# Patient Record
Sex: MALE | Race: WHITE | NOT HISPANIC OR LATINO | Employment: OTHER | URBAN - METROPOLITAN AREA
[De-identification: names, ages, dates, MRNs, and addresses within clinical notes are randomized per-mention and may not be internally consistent; named-entity substitution may affect disease eponyms.]

---

## 2017-07-06 ENCOUNTER — APPOINTMENT (OUTPATIENT)
Dept: RADIOLOGY | Facility: MEDICAL CENTER | Age: 67
DRG: 871 | End: 2017-07-06
Attending: EMERGENCY MEDICINE
Payer: MEDICARE

## 2017-07-06 ENCOUNTER — RESOLUTE PROFESSIONAL BILLING HOSPITAL PROF FEE (OUTPATIENT)
Dept: HOSPITALIST | Facility: MEDICAL CENTER | Age: 67
End: 2017-07-06
Payer: MEDICARE

## 2017-07-06 ENCOUNTER — HOSPITAL ENCOUNTER (INPATIENT)
Facility: MEDICAL CENTER | Age: 67
LOS: 3 days | DRG: 871 | End: 2017-07-09
Attending: EMERGENCY MEDICINE | Admitting: INTERNAL MEDICINE
Payer: MEDICARE

## 2017-07-06 DIAGNOSIS — N39.0 ACUTE UTI: ICD-10-CM

## 2017-07-06 DIAGNOSIS — R47.1 DYSARTHRIA: ICD-10-CM

## 2017-07-06 PROBLEM — A41.9 SEPSIS, UNSPECIFIED: Status: ACTIVE | Noted: 2017-07-06

## 2017-07-06 LAB
ALBUMIN SERPL BCP-MCNC: 3.5 G/DL (ref 3.2–4.9)
ALBUMIN/GLOB SERPL: 1.2 G/DL
ALP SERPL-CCNC: 45 U/L (ref 30–99)
ALT SERPL-CCNC: 19 U/L (ref 2–50)
ANION GAP SERPL CALC-SCNC: 9 MMOL/L (ref 0–11.9)
APPEARANCE UR: CLEAR
APTT PPP: 28.6 SEC (ref 24.7–36)
AST SERPL-CCNC: 18 U/L (ref 12–45)
BACTERIA #/AREA URNS HPF: NEGATIVE /HPF
BASOPHILS # BLD AUTO: 0.2 % (ref 0–1.8)
BASOPHILS # BLD: 0.03 K/UL (ref 0–0.12)
BILIRUB SERPL-MCNC: 0.9 MG/DL (ref 0.1–1.5)
BILIRUB UR QL STRIP.AUTO: NEGATIVE
BUN SERPL-MCNC: 14 MG/DL (ref 8–22)
CALCIUM SERPL-MCNC: 8.8 MG/DL (ref 8.5–10.5)
CHLORIDE SERPL-SCNC: 107 MMOL/L (ref 96–112)
CO2 SERPL-SCNC: 19 MMOL/L (ref 20–33)
COLOR UR: YELLOW
CREAT SERPL-MCNC: 0.86 MG/DL (ref 0.5–1.4)
EOSINOPHIL # BLD AUTO: 0 K/UL (ref 0–0.51)
EOSINOPHIL NFR BLD: 0 % (ref 0–6.9)
EPI CELLS #/AREA URNS HPF: NEGATIVE /HPF
ERYTHROCYTE [DISTWIDTH] IN BLOOD BY AUTOMATED COUNT: 43.1 FL (ref 35.9–50)
GFR SERPL CREATININE-BSD FRML MDRD: >60 ML/MIN/1.73 M 2
GLOBULIN SER CALC-MCNC: 2.9 G/DL (ref 1.9–3.5)
GLUCOSE SERPL-MCNC: 132 MG/DL (ref 65–99)
GLUCOSE UR STRIP.AUTO-MCNC: NEGATIVE MG/DL
HCT VFR BLD AUTO: 40.8 % (ref 42–52)
HGB BLD-MCNC: 13.8 G/DL (ref 14–18)
HYALINE CASTS #/AREA URNS LPF: ABNORMAL /LPF
IMM GRANULOCYTES # BLD AUTO: 0.05 K/UL (ref 0–0.11)
IMM GRANULOCYTES NFR BLD AUTO: 0.4 % (ref 0–0.9)
INR PPP: 1.12 (ref 0.87–1.13)
KETONES UR STRIP.AUTO-MCNC: NEGATIVE MG/DL
LACTATE BLD-SCNC: 1.1 MMOL/L (ref 0.5–2)
LACTATE BLD-SCNC: 1.5 MMOL/L (ref 0.5–2)
LEUKOCYTE ESTERASE UR QL STRIP.AUTO: ABNORMAL
LYMPHOCYTES # BLD AUTO: 0.51 K/UL (ref 1–4.8)
LYMPHOCYTES NFR BLD: 4.1 % (ref 22–41)
MCH RBC QN AUTO: 28.9 PG (ref 27–33)
MCHC RBC AUTO-ENTMCNC: 33.8 G/DL (ref 33.7–35.3)
MCV RBC AUTO: 85.5 FL (ref 81.4–97.8)
MICRO URNS: ABNORMAL
MONOCYTES # BLD AUTO: 0.86 K/UL (ref 0–0.85)
MONOCYTES NFR BLD AUTO: 6.9 % (ref 0–13.4)
NEUTROPHILS # BLD AUTO: 11.07 K/UL (ref 1.82–7.42)
NEUTROPHILS NFR BLD: 88.4 % (ref 44–72)
NITRITE UR QL STRIP.AUTO: POSITIVE
NRBC # BLD AUTO: 0 K/UL
NRBC BLD AUTO-RTO: 0 /100 WBC
PH UR STRIP.AUTO: 6.5 [PH]
PLATELET # BLD AUTO: 152 K/UL (ref 164–446)
PMV BLD AUTO: 9.7 FL (ref 9–12.9)
POTASSIUM SERPL-SCNC: 3.6 MMOL/L (ref 3.6–5.5)
PROT SERPL-MCNC: 6.4 G/DL (ref 6–8.2)
PROT UR QL STRIP: NEGATIVE MG/DL
PROTHROMBIN TIME: 14.8 SEC (ref 12–14.6)
RBC # BLD AUTO: 4.77 M/UL (ref 4.7–6.1)
RBC # URNS HPF: ABNORMAL /HPF
RBC UR QL AUTO: ABNORMAL
SODIUM SERPL-SCNC: 135 MMOL/L (ref 135–145)
SP GR UR STRIP.AUTO: 1.04
TROPONIN I SERPL-MCNC: <0.01 NG/ML (ref 0–0.04)
WBC # BLD AUTO: 12.5 K/UL (ref 4.8–10.8)
WBC #/AREA URNS HPF: ABNORMAL /HPF

## 2017-07-06 PROCEDURE — 85610 PROTHROMBIN TIME: CPT

## 2017-07-06 PROCEDURE — 700105 HCHG RX REV CODE 258: Performed by: INTERNAL MEDICINE

## 2017-07-06 PROCEDURE — 87040 BLOOD CULTURE FOR BACTERIA: CPT

## 2017-07-06 PROCEDURE — 700102 HCHG RX REV CODE 250 W/ 637 OVERRIDE(OP): Performed by: EMERGENCY MEDICINE

## 2017-07-06 PROCEDURE — 36415 COLL VENOUS BLD VENIPUNCTURE: CPT

## 2017-07-06 PROCEDURE — 84484 ASSAY OF TROPONIN QUANT: CPT

## 2017-07-06 PROCEDURE — 80053 COMPREHEN METABOLIC PANEL: CPT

## 2017-07-06 PROCEDURE — 83036 HEMOGLOBIN GLYCOSYLATED A1C: CPT

## 2017-07-06 PROCEDURE — 99223 1ST HOSP IP/OBS HIGH 75: CPT | Mod: AI | Performed by: INTERNAL MEDICINE

## 2017-07-06 PROCEDURE — 83605 ASSAY OF LACTIC ACID: CPT

## 2017-07-06 PROCEDURE — 770020 HCHG ROOM/CARE - TELE (206)

## 2017-07-06 PROCEDURE — 81001 URINALYSIS AUTO W/SCOPE: CPT

## 2017-07-06 PROCEDURE — 70498 CT ANGIOGRAPHY NECK: CPT

## 2017-07-06 PROCEDURE — 94760 N-INVAS EAR/PLS OXIMETRY 1: CPT

## 2017-07-06 PROCEDURE — 85730 THROMBOPLASTIN TIME PARTIAL: CPT

## 2017-07-06 PROCEDURE — 87086 URINE CULTURE/COLONY COUNT: CPT

## 2017-07-06 PROCEDURE — 71010 DX-CHEST-LIMITED (1 VIEW): CPT

## 2017-07-06 PROCEDURE — A9270 NON-COVERED ITEM OR SERVICE: HCPCS | Performed by: INTERNAL MEDICINE

## 2017-07-06 PROCEDURE — 700117 HCHG RX CONTRAST REV CODE 255: Performed by: EMERGENCY MEDICINE

## 2017-07-06 PROCEDURE — 99285 EMERGENCY DEPT VISIT HI MDM: CPT

## 2017-07-06 PROCEDURE — 700102 HCHG RX REV CODE 250 W/ 637 OVERRIDE(OP): Performed by: INTERNAL MEDICINE

## 2017-07-06 PROCEDURE — 85025 COMPLETE CBC W/AUTO DIFF WBC: CPT

## 2017-07-06 PROCEDURE — 70450 CT HEAD/BRAIN W/O DYE: CPT

## 2017-07-06 PROCEDURE — 70496 CT ANGIOGRAPHY HEAD: CPT

## 2017-07-06 PROCEDURE — A9270 NON-COVERED ITEM OR SERVICE: HCPCS | Performed by: EMERGENCY MEDICINE

## 2017-07-06 RX ORDER — ASPIRIN 81 MG/1
81 TABLET, CHEWABLE ORAL DAILY
COMMUNITY
End: 2017-08-23

## 2017-07-06 RX ORDER — ONDANSETRON 2 MG/ML
4 INJECTION INTRAMUSCULAR; INTRAVENOUS EVERY 4 HOURS PRN
Status: DISCONTINUED | OUTPATIENT
Start: 2017-07-06 | End: 2017-07-09 | Stop reason: HOSPADM

## 2017-07-06 RX ORDER — SODIUM CHLORIDE 9 MG/ML
30 INJECTION, SOLUTION INTRAVENOUS
Status: COMPLETED | OUTPATIENT
Start: 2017-07-06 | End: 2017-07-06

## 2017-07-06 RX ORDER — BISACODYL 10 MG
10 SUPPOSITORY, RECTAL RECTAL
Status: DISCONTINUED | OUTPATIENT
Start: 2017-07-06 | End: 2017-07-09 | Stop reason: HOSPADM

## 2017-07-06 RX ORDER — ASPIRIN 300 MG/1
300 SUPPOSITORY RECTAL ONCE
Status: COMPLETED | OUTPATIENT
Start: 2017-07-06 | End: 2017-07-06

## 2017-07-06 RX ORDER — ACETAMINOPHEN 325 MG/1
650 TABLET ORAL EVERY 6 HOURS PRN
Status: DISCONTINUED | OUTPATIENT
Start: 2017-07-06 | End: 2017-07-09 | Stop reason: HOSPADM

## 2017-07-06 RX ORDER — ASPIRIN 325 MG
325 TABLET ORAL DAILY
Status: DISCONTINUED | OUTPATIENT
Start: 2017-07-06 | End: 2017-07-06 | Stop reason: ALTCHOICE

## 2017-07-06 RX ORDER — CIPROFLOXACIN 500 MG/1
500 TABLET, FILM COATED ORAL 2 TIMES DAILY
Status: ON HOLD | COMMUNITY
End: 2017-07-09

## 2017-07-06 RX ORDER — KETOROLAC TROMETHAMINE 30 MG/ML
30 INJECTION, SOLUTION INTRAMUSCULAR; INTRAVENOUS EVERY 6 HOURS PRN
Status: DISCONTINUED | OUTPATIENT
Start: 2017-07-06 | End: 2017-07-09 | Stop reason: HOSPADM

## 2017-07-06 RX ORDER — SODIUM CHLORIDE 9 MG/ML
INJECTION, SOLUTION INTRAVENOUS CONTINUOUS
Status: DISCONTINUED | OUTPATIENT
Start: 2017-07-06 | End: 2017-07-09

## 2017-07-06 RX ORDER — SODIUM CHLORIDE 9 MG/ML
1000 INJECTION, SOLUTION INTRAVENOUS ONCE
Status: DISCONTINUED | OUTPATIENT
Start: 2017-07-06 | End: 2017-07-06

## 2017-07-06 RX ORDER — LABETALOL HYDROCHLORIDE 5 MG/ML
10 INJECTION, SOLUTION INTRAVENOUS EVERY 4 HOURS PRN
Status: DISCONTINUED | OUTPATIENT
Start: 2017-07-06 | End: 2017-07-09 | Stop reason: HOSPADM

## 2017-07-06 RX ORDER — ASPIRIN 81 MG/1
81 TABLET, CHEWABLE ORAL DAILY
Status: DISCONTINUED | OUTPATIENT
Start: 2017-07-07 | End: 2017-07-09 | Stop reason: HOSPADM

## 2017-07-06 RX ORDER — ASPIRIN 325 MG
650 TABLET ORAL EVERY 6 HOURS PRN
COMMUNITY
End: 2017-08-23

## 2017-07-06 RX ORDER — AMOXICILLIN 250 MG
2 CAPSULE ORAL 2 TIMES DAILY
Status: DISCONTINUED | OUTPATIENT
Start: 2017-07-07 | End: 2017-07-09 | Stop reason: HOSPADM

## 2017-07-06 RX ORDER — SODIUM CHLORIDE 9 MG/ML
500 INJECTION, SOLUTION INTRAVENOUS
Status: DISCONTINUED | OUTPATIENT
Start: 2017-07-06 | End: 2017-07-09 | Stop reason: HOSPADM

## 2017-07-06 RX ORDER — ONDANSETRON 4 MG/1
4 TABLET, ORALLY DISINTEGRATING ORAL EVERY 4 HOURS PRN
Status: DISCONTINUED | OUTPATIENT
Start: 2017-07-06 | End: 2017-07-09 | Stop reason: HOSPADM

## 2017-07-06 RX ORDER — MORPHINE SULFATE 4 MG/ML
4 INJECTION, SOLUTION INTRAMUSCULAR; INTRAVENOUS
Status: DISCONTINUED | OUTPATIENT
Start: 2017-07-06 | End: 2017-07-09 | Stop reason: HOSPADM

## 2017-07-06 RX ORDER — POLYETHYLENE GLYCOL 3350 17 G/17G
1 POWDER, FOR SOLUTION ORAL
Status: DISCONTINUED | OUTPATIENT
Start: 2017-07-06 | End: 2017-07-09 | Stop reason: HOSPADM

## 2017-07-06 RX ORDER — OXYCODONE HYDROCHLORIDE 10 MG/1
10 TABLET ORAL
Status: DISCONTINUED | OUTPATIENT
Start: 2017-07-06 | End: 2017-07-09 | Stop reason: HOSPADM

## 2017-07-06 RX ORDER — OXYCODONE HYDROCHLORIDE 5 MG/1
5 TABLET ORAL
Status: DISCONTINUED | OUTPATIENT
Start: 2017-07-06 | End: 2017-07-09 | Stop reason: HOSPADM

## 2017-07-06 RX ADMIN — SODIUM CHLORIDE: 9 INJECTION, SOLUTION INTRAVENOUS at 23:19

## 2017-07-06 RX ADMIN — ACETAMINOPHEN 650 MG: 325 TABLET, FILM COATED ORAL at 20:58

## 2017-07-06 RX ADMIN — SODIUM CHLORIDE 1000 ML: 9 INJECTION, SOLUTION INTRAVENOUS at 18:50

## 2017-07-06 RX ADMIN — IOHEXOL 100 ML: 350 INJECTION, SOLUTION INTRAVENOUS at 15:59

## 2017-07-06 RX ADMIN — SODIUM CHLORIDE 1000 ML: 9 INJECTION, SOLUTION INTRAVENOUS at 17:40

## 2017-07-06 RX ADMIN — ASPIRIN 300 MG: 300 SUPPOSITORY RECTAL at 17:34

## 2017-07-06 ASSESSMENT — LIFESTYLE VARIABLES
EVER_SMOKED: NEVER
ALCOHOL_USE: NO

## 2017-07-06 ASSESSMENT — PAIN SCALES - GENERAL
PAINLEVEL_OUTOF10: 4
PAINLEVEL_OUTOF10: 3

## 2017-07-06 NOTE — IP AVS SNAPSHOT
LPATH Access Code: [email or web address]  Expires: 8/8/2017 11:31 AM    LPATH  A secure, online tool to manage your health information     [employer]’s LPATH® is a secure, online tool that connects you to your personalized health information from the privacy of your home -- day or night - making it very easy for you to manage your healthcare. Once the activation process is completed, you can even access your medical information using the LPATH linda, which is available for free in the Apple Linda store or Google Play store.     LPATH provides the following levels of access (as shown below):   My Chart Features   [facility] Primary Care Doctor [facility]  Specialists [facility]  Urgent  Care Non-[facility]  Primary Care  Doctor   Email your healthcare team securely and privately 24/7 X X X X   Manage appointments: schedule your next appointment; view details of past/upcoming appointments X      Request prescription refills. X      View recent personal medical records, including lab and immunizations X X X X   View health record, including health history, allergies, medications X X X X   Read reports about your outpatient visits, procedures, consult and ER notes X X X X   See your discharge summary, which is a recap of your hospital and/or ER visit that includes your diagnosis, lab results, and care plan. X X       How to register for LPATH:  1. Go to  [email or web address].  2. Click on the Sign Up Now box, which takes you to the New Member Sign Up page. You will need to provide the following information:  a. Enter your LPATH Access Code exactly as it appears at the top of this page. (You will not need to use this code after you’ve completed the sign-up process. If you do not sign up before the expiration date, you must request a new code.)   b. Enter your date of birth.   c. Enter your home email address.   d. Click Submit, and follow the next screen’s instructions.  3. Create a LPATH ID. This will be your LPATH  login ID and cannot be changed, so think of one that is secure and easy to remember.  4. Create a [employer] password. You can change your password at any time.  5. Enter your Password Reset Question and Answer. This can be used at a later time if you forget your password.   6. Enter your e-mail address. This allows you to receive e-mail notifications when new information is available in [employer].  7. Click Sign Up. You can now view your health information.    For assistance activating your [employer] account, call [phone]

## 2017-07-06 NOTE — ED NOTES
"Med rec complete per pt at bedside  Allergies reviewed - NKDA  Pt finished a second 10 day course of Cipro >2 weeks ago,  pt states he was originally put on Cipro, then some \"stronger antibiotic\"  After for 7 days, then put on the second course of Cipro   Pt states that he also got IV ABX at the other hospital, but unsure of what it was  Pt usually takes a baby aspirin daily, but states he will not take it when he   Takes full strength aspirin, which he has been recently for his headaches   "

## 2017-07-06 NOTE — IP AVS SNAPSHOT
7/9/2017    Carl Finney  [address]  [facility] [address]    Dear Carl:    [facility] wants to ensure your discharge home is safe and you or your loved ones have had all of your questions answered regarding your care after you leave the hospital.    Below is a list of resources and contact information should you have any questions regarding your hospital stay, follow-up instructions, or active medical symptoms.    Questions or Concerns Regarding… Contact   Medical Questions Related to Your Discharge  (7 days a week, 8am-5pm) Contact a Nurse Care Coordinator   [phone]   Medical Questions Not Related to Your Discharge  (24 hours a day / 7 days a week)  Contact the Nurse Health Line   [phone]    Medications or Discharge Instructions Refer to your discharge packet   or contact your [facility] Primary Care Provider   [phone]   Follow-up Appointment(s) Schedule your appointment via [employer]   or contact Scheduling [phone]   Billing Review your statement via [employer]  or contact Billing [phone]   Medical Records Review your records via [employer]   or contact Medical Records [phone]     You may receive a telephone call within two days of discharge. This call is to make certain you understand your discharge instructions and have the opportunity to have any questions answered. You can also easily access your medical information, test results and upcoming appointments via the [employer] free online health management tool. You can learn more and sign up at [email or web address]. For assistance setting up your [employer] account, please call [phone].    Once again, we want to ensure your discharge home is safe and that you have a clear understanding of any next steps in your care. If you have any questions or concerns, please do not hesitate to contact us, we are here for you. Thank you for choosing [facility] for your healthcare needs.    Sincerely,    Your [facility] Healthcare Team

## 2017-07-06 NOTE — IP AVS SNAPSHOT
" <p align=\"LEFT\"><IMG SRC=\"//[email or web address]$/[email or web address]\" alt=\"Image\" WIDTH=\"50%\" HEIGHT=\"200\" BORDER=\"\"></p>                   Name:Carl Finney  Medical Record Number:[id number]  CSN: [id number]    YOB: 1950   Age: 67 y.o.  Sex: male  HT:1.88 m (6' 2.02\") WT: 80.3 kg (177 lb 0.5 oz)          Admit Date: 7/6/2017     Discharge Date:   Today's Date: 7/9/2017  Attending Doctor:  Sari Cardoza D.O.                  Allergies:  Ceftriaxone          Your appointments     Jul 11, 2017 10:00 AM   CARE MANAGER TELEPHONE VISIT with CARE MANAGER   [address] [place] [id number] [phone]           ***IMPORTANT**** This is a phone visit only. Do not come into the office. The Care Manager will call you at the scheduled time for Care Manager Telephone Visit.            Jul 12, 2017  1:00 PM   Established Patient with Lisette Govea M.D.   [address] [place] [id number] [phone]           You will be receiving a confirmation call a few days before your appointment from our automated call confirmation system.              Follow-up Information     1. Schedule an appointment as soon as possible for a visit with Hadley Garcia M.D..    Specialty:  Urology    Why:  Our office will contact to arrange follow up in about 2 weeks    Contact information    [address]  [place] [address]  [phone]           Medication List      Take these Medications        Instructions    amoxicillin-clavulanate 875-125 MG Tabs   Commonly known as:  AUGMENTIN    Take 1 Tab by mouth 2 times a day for 10 days.   Dose:  1 Tab       * aspirin 325 MG Tabs   Commonly known as:  ASA    Take 650 mg by mouth every 6 hours as needed (headache).   Dose:  650 mg       * aspirin 81 MG Chew chewable tablet   Commonly known as:  ASA    Take 81 mg by mouth every day.   Dose:  81 mg       phenazopyridine 200 MG Tabs   "   Commonly known as:  PYRIDIUM    Take 1 Tab by mouth 3 times a day as needed (dysuria).   Dose:  200 mg       phosphorus 155-852-130 MG tablet   Commonly known as:  K-PHOS-NEUTRAL, PHOSPHA 250 NEUTRAL    Take 2 Tabs by mouth 2 Times a Day for 4 days.   Dose:  2 Tab       * Notice:  This list has 2 medication(s) that are the same as other medications prescribed for you. Read the directions carefully, and ask your doctor or other care provider to review them with you.

## 2017-07-06 NOTE — ED PROVIDER NOTES
ED Provider Note    Scribed for Vasyl Stephens M.D. by Kranthi Burton. 7/6/2017, 3:46 PM.    Primary care provider: Brendan Hargrove M.D.  Means of arrival: ambulance  History obtained from: patient, EMS  History limited by: acuity     CHIEF COMPLAINT  Chief Complaint   Patient presents with   • Possible Stroke       HPI  Carl Finney is a 67 y.o. male who presents to the Emergency Department for evaluation of difficulty speaking onset 2:00PM today. Per EMS, the patient was seen this morning at [facility] for UTI. He was given IV Rocephin and prescribedKeflex and sent home. The patient took a nap when he got home. He woke up at 2:00PM and had difficulty speaking and was confused. EMS adds the patient's wife reported the patient had left sided facial droop, but EMS states they did not note any facial droop. On arrival, the patient is awake, alert, and able to answer questions. The patient states he feels improved upon arrival to the ED. EMS reports the patient's blood glucose was 126. EMS states the patient has been having a persistent UTI for the past few months, and it has not been resolved with various antibiotics.  The patient has a history of frequent UTIs.    The history is limited by the acuity of this case.     REVIEW OF SYSTEMS  Review of Systems   Neurological:        Positive difficulty speaking  Positive confusion      The ROS is limited by the acuity of this case.     C.      PAST MEDICAL HISTORY   has a past medical history of Urinary incontinence; Arthritis; Cancer (CMS-HCC) (2016); and Bowel habit changes.    SURGICAL HISTORY   has past surgical history that includes other abdominal surgery (3/9/2016); other orthopedic surgery (1993); and inguinal hernia repair (Right, 9/30/2016).    SOCIAL HISTORY  Social History   Substance Use Topics   • Smoking status: Never Smoker    • Smokeless tobacco: Never Used   • Alcohol Use: No      History   Drug Use No       FAMILY HISTORY  No family  "history on file.    CURRENT MEDICATIONS  No current facility-administered medications on file prior to encounter.     No current outpatient prescriptions on file prior to encounter.      ALLERGIES  No Known Allergies    PHYSICAL EXAM  VITAL SIGNS: Temp(Src) 37.6 °C (99.7 °F)  Ht 1.88 m (6' 2.02\")  Wt 79.379 kg (175 lb)  BMI 22.46 kg/m2  Vitals reviewed.  Constitutional: Awake, alert, somewhat slow to respond, but no acute distress  HENT: Normocephalic, Atraumatic, Bilateral external ears normal, Oropharynx moist, No oral exudates, Nose normal.   Eyes: PERRL, EOMI, Conjunctiva normal, No discharge.   Neck: Normal range of motion, No tenderness, Supple, No stridor.   Cardiovascular: Normal heart rate, Normal rhythm, No murmurs, No rubs, No gallops.   Thorax & Lungs: Normal breath sounds, No respiratory distress, No wheezing,   Abdomen: Bowel sounds normal, Soft, No tenderness  Skin: Warm, Dry, No erythema, No rash.   Back: No tenderness, No CVA tenderness.     Musculoskeletal: Good range of motion in all major joints.   Neurologic: Alert, No focal deficits noted.   Psychiatric: Affect normal    LABS  Results for orders placed or performed during the hospital encounter of 07/06/17   PROTHROMBIN TIME   Result Value Ref Range    PT 14.8 (H) 12.0 - 14.6 sec    INR 1.12 0.87 - 1.13   APTT   Result Value Ref Range    APTT 28.6 24.7 - 36.0 sec   CBC WITH DIFFERENTIAL   Result Value Ref Range    WBC 12.5 (H) 4.8 - 10.8 K/uL    RBC 4.77 4.70 - 6.10 M/uL    Hemoglobin 13.8 (L) 14.0 - 18.0 g/dL    Hematocrit 40.8 (L) 42.0 - 52.0 %    MCV 85.5 81.4 - 97.8 fL    MCH 28.9 27.0 - 33.0 pg    MCHC 33.8 33.7 - 35.3 g/dL    RDW 43.1 35.9 - 50.0 fL    Platelet Count 152 (L) 164 - 446 K/uL    MPV 9.7 9.0 - 12.9 fL    Neutrophils-Polys 88.40 (H) 44.00 - 72.00 %    Lymphocytes 4.10 (L) 22.00 - 41.00 %    Monocytes 6.90 0.00 - 13.40 %    Eosinophils 0.00 0.00 - 6.90 %    Basophils 0.20 0.00 - 1.80 %    Immature Granulocytes 0.40 0.00 - " 0.90 %    Nucleated RBC 0.00 /100 WBC    Neutrophils (Absolute) 11.07 (H) 1.82 - 7.42 K/uL    Lymphs (Absolute) 0.51 (L) 1.00 - 4.80 K/uL    Monos (Absolute) 0.86 (H) 0.00 - 0.85 K/uL    Eos (Absolute) 0.00 0.00 - 0.51 K/uL    Baso (Absolute) 0.03 0.00 - 0.12 K/uL    Immature Granulocytes (abs) 0.05 0.00 - 0.11 K/uL    NRBC (Absolute) 0.00 K/uL   COMP METABOLIC PANEL   Result Value Ref Range    Sodium 135 135 - 145 mmol/L    Potassium 3.6 3.6 - 5.5 mmol/L    Chloride 107 96 - 112 mmol/L    Co2 19 (L) 20 - 33 mmol/L    Anion Gap 9.0 0.0 - 11.9    Glucose 132 (H) 65 - 99 mg/dL    Bun 14 8 - 22 mg/dL    Creatinine 0.86 0.50 - 1.40 mg/dL    Calcium 8.8 8.5 - 10.5 mg/dL    AST(SGOT) 18 12 - 45 U/L    ALT(SGPT) 19 2 - 50 U/L    Alkaline Phosphatase 45 30 - 99 U/L    Total Bilirubin 0.9 0.1 - 1.5 mg/dL    Albumin 3.5 3.2 - 4.9 g/dL    Total Protein 6.4 6.0 - 8.2 g/dL    Globulin 2.9 1.9 - 3.5 g/dL    A-G Ratio 1.2 g/dL   TROPONIN   Result Value Ref Range    Troponin I <0.01 0.00 - 0.04 ng/mL   LACTIC ACID   Result Value Ref Range    Lactic Acid 1.5 0.5 - 2.0 mmol/L   ESTIMATED GFR   Result Value Ref Range    GFR If African American >60 >60 mL/min/1.73 m 2    GFR If Non African American >60 >60 mL/min/1.73 m 2      All labs reviewed by me.    RADIOLOGY  DX-CHEST-LIMITED (1 VIEW)   Final Result      Linear atelectasis within the left lung base.      CT-CTA HEAD WITH & W/O-POST PROCESS   Final Result      CT angiogram of the [place] within normal limits.      CT-CTA NECK WITH & W/O-POST PROCESSING   Final Result      No evidence of carotid or vertebral arterial occlusion or dissection.      CT-HEAD W/O   Final Result      No evidence of acute intracranial process.      MR-BRAIN-W/O    (Results Pending)   ECHOCARDIOGRAM COMP W/O CONT    (Results Pending)   US-RENAL    (Results Pending)     The radiologist's interpretation of all radiological studies have been reviewed by me.    COURSE & MEDICAL DECISION  MAKING  Pertinent Labs & Imaging studies reviewed. (See chart for details)    3:28 PM - Patient seen and examined. Dr. Sabillon (Neurology) is also with the patient. The patient presents with difficulty speaking, and the differential diagnosis includes but is not limited to UTI, sepsis, CVA, electrolyte abnormality. Ordered for chest x-ray, head CT, CTA head CT, CTA neck CT, blood culture x2, UA, PTT, APTT, CBC with differential, CMP, Troponin, Lactic Acid to evaluate. Patient will be treated with 325 mg Aspirin for his symptoms. I discussed the treatment plan as above with the patient. He understood and verbalized agreement.     3:50 PM - Dr. Sabillon updated me on the patient's case. After evaluation, states the patient is not a candidate for IV alteplase. He concurs the patient may be sepsis rather than stroke secondary to his long history of chronic UTIs. Dr. Sabillon will order an MRI, and recommends the patient is admitted for sepsis.     The patient did not receive alteplase.  There is not a candidate for alteplase because of very minimal symptoms that are likely secondary to UTI and an acute delirium and not secondary to a CVA.    4:39 PM - I discussed the patient's case and the above findings with Dr. Self (Hospitalist) who agrees to admit the patient.         The patient is a UTI.  He has received Rocephin IV earlier this morning at another hospital..  This was given an [facility] hospital prior to coming here.  She is not given additional IV antibiotic.  Since this time.  Did not require fluid bolus, lactate is normal.  His blood pressure is normal. He will be admitted to the hospitals in critical condition.    DISPOSITION:  Patient will be admitted to Dr. Self in guarded condition.     FINAL IMPRESSION  1. Acute UTI    2. Dysarthria    3.  Rule out CVA  4.  Possible sepsis   5.  Acute delirium     I, Kranthi Burton (Scribe), am scribing for, and in the presence of, Vasyl Stephens M.D..    Electronically signed  by: Kranthi Burton (Scribe), 7/6/2017    IVasyl M.D. personally performed the services described in this documentation, as scribed by Kranthi Burton in my presence, and it is both accurate and complete.    The note accurately reflects work and decisions made by me.  Vasyl Stephens  7/6/2017  5:46 PM

## 2017-07-06 NOTE — IP AVS SNAPSHOT
" Home Care Instructions                                                                                                                  Name:Carl Finney  Medical Record Number:[id number]  CSN: [id number]    YOB: 1950   Age: 67 y.o.  Sex: male  HT:1.88 m (6' 2.02\") WT: 80.3 kg (177 lb 0.5 oz)          Admit Date: 7/6/2017     Discharge Date:   Today's Date: 7/9/2017  Attending Doctor:  Sari Cardoza D.O.                  Allergies:  Ceftriaxone            Discharge Instructions       Discharge Instructions    Discharged to home by car with relative. Discharged via wheelchair, hospital escort: Yes.  Special equipment needed: Not Applicable    Be sure to schedule a follow-up appointment with your primary care doctor or any specialists as instructed.     Discharge Plan:   Diet Plan: Discussed  Activity Level: Discussed  Confirmed Follow up Appointment: Appointment Scheduled  Confirmed Symptoms Management: Discussed  Medication Reconciliation Updated: Yes  Influenza Vaccine Indication: Not indicated: Previously immunized this influenza season and > 8 years of age    I understand that a diet low in cholesterol, fat, and sodium is recommended for good health. Unless I have been given specific instructions below for another diet, I accept this instruction as my diet prescription.   Other diet:     Special Instructions: Sepsis, Adult  Sepsis is a serious infection of your blood or tissues that affects your whole body. The infection that causes sepsis may be bacterial, viral, fungal, or parasitic. Sepsis may be life threatening. Sepsis can cause your blood pressure to drop. This may result in shock. Shock causes your central nervous system and your organs to stop working correctly.   RISK FACTORS  Sepsis can happen in anyone, but it is more likely to happen in people who have weakened immune systems.  SIGNS AND SYMPTOMS   Symptoms of sepsis can include:  · Fever or low body temperature " (hypothermia).  · Rapid breathing (hyperventilation).  · Chills.  · Rapid heartbeat (tachycardia).  · Confusion or light-headedness.  · Trouble breathing.  · Urinating much less than usual.  · Cool, clammy skin or red, flushed skin.  · Other problems with the heart, kidneys, or brain.  DIAGNOSIS   Your health care provider will likely do tests to look for an infection, to see if the infection has spread to your blood, and to see how serious your condition is. Tests can include:  · Blood tests, including cultures of your blood.  · Cultures of other fluids from your body, such as:  ¨ Urine.  ¨ Pus from wounds.  ¨ Mucus coughed up from your lungs.  · Urine tests other than cultures.  · X-ray exams or other imaging tests.  TREATMENT   Treatment will begin with elimination of the source of infection. If your sepsis is likely caused by a bacterial or fungal infection, you will be given antibiotic or antifungal medicines.  You may also receive:  · Oxygen.  · Fluids through an IV tube.  · Medicines to increase your blood pressure.  · A machine to clean your blood (dialysis) if your kidneys fail.  · A machine to help you breathe if your lungs fail.  SEEK IMMEDIATE MEDICAL CARE IF:  You get an infection or develop any of the signs and symptoms of sepsis after surgery or a hospitalization.     This information is not intended to replace advice given to you by your health care provider. Make sure you discuss any questions you have with your health care provider.     Document Released: 09/15/2004 Document Revised: 05/03/2016 Document Reviewed: 08/25/2014  [employer] Interactive Patient Education ©2016 [employer]      · Is patient discharged on Warfarin / Coumadin?   No     · Is patient Post Blood Transfusion?  No    Depression / Suicide Risk    As you are discharged from this [facility] facility, it is important to learn how to keep safe from harming yourself.    Recognize the warning signs:  · Abrupt changes in personality,  positive or negative- including increase in energy   · Giving away possessions  · Change in eating patterns- significant weight changes-  positive or negative  · Change in sleeping patterns- unable to sleep or sleeping all the time   · Unwillingness or inability to communicate  · Depression  · Unusual sadness, discouragement and loneliness  · Talk of wanting to die  · Neglect of personal appearance   · Rebelliousness- reckless behavior  · Withdrawal from people/activities they love  · Confusion- inability to concentrate     If you or a loved one observes any of these behaviors or has concerns about self-harm, here's what you can do:  · Talk about it- your feelings and reasons for harming yourself  · Remove any means that you might use to hurt yourself (examples: pills, rope, extension cords, firearm)  · Get professional help from the community (Mental Health, Substance Abuse, psychological counseling)  · Do not be alone:Call your Safe Contact- someone whom you trust who will be there for you.  · Call your local CRISIS HOTLINE 155-4277 or 818-174-2540  · Call your local Children's Mobile Crisis Response Team [place] (441) 139-6786 or wwwTagito  · Call the toll free National Suicide Prevention Hotlines   · National Suicide Prevention Lifeline 492-982-YKOG (0528)  · National Hope Line Network 800-SUICIDE (739-7832)        Your appointments     Jul 11, 2017 10:00 AM   CARE MANAGER TELEPHONE VISIT with CARE MANAGER   [address]  [facility] [id number] [phone]           ***IMPORTANT**** This is a phone visit only. Do not come into the office. The Care Manager will call you at the scheduled time for Care Manager Telephone Visit.            Jul 12, 2017  1:00 PM   Established Patient with Lisette Govea M.D.   [address]  [facility] [id number] [phone]           You will be receiving a confirmation  call a few days before your appointment from our automated call confirmation system.              Follow-up Information     1. Schedule an appointment as soon as possible for a visit with Hadley Garcia M.D..    Specialty:  Urology    Why:  Our office will contact to arrange follow up in about 2 weeks    Contact information    [address] [place] [address]  [phone]           Discharge Medication Instructions:    Below are the medications your physician expects you to take upon discharge:    Review all your home medications and newly ordered medications with your doctor and/or pharmacist. Follow medication instructions as directed by your doctor and/or pharmacist.    Please keep your medication list with you and share with your physician.               Medication List      START taking these medications        Instructions    Morning Afternoon Evening Bedtime    amoxicillin-clavulanate 875-125 MG Tabs   Commonly known as:  AUGMENTIN   Next Dose Due:  07/09/2017 in the evening           Take 1 Tab by mouth 2 times a day for 10 days.   Dose:  1 Tab                        phenazopyridine 200 MG Tabs   Commonly known as:  PYRIDIUM        Take 1 Tab by mouth 3 times a day as needed (dysuria).   Dose:  200 mg                        phosphorus 155-852-130 MG tablet   Last time this was given:  2 Tabs on 7/9/2017  8:23 AM   Commonly known as:  K-PHOS-NEUTRAL, PHOSPHA 250 NEUTRAL   Next Dose Due:  07/09/2017 in the evening           Take 2 Tabs by mouth 2 Times a Day for 4 days.   Dose:  2 Tab                          CONTINUE taking these medications        Instructions    Morning Afternoon Evening Bedtime    * aspirin 325 MG Tabs   Commonly known as:  ASA        Take 650 mg by mouth every 6 hours as needed (headache).   Dose:  650 mg                        * aspirin 81 MG Chew chewable tablet   Last time this was given:  81 mg on 7/9/2017  8:23 AM   Commonly known as:  ASA   Next Dose Due:  07/10/2017 in the  morning         Take 81 mg by mouth every day.   Dose:  81 mg                        * Notice:  This list has 2 medication(s) that are the same as other medications prescribed for you. Read the directions carefully, and ask your doctor or other care provider to review them with you.      STOP taking these medications     ciprofloxacin 500 MG Tabs   Commonly known as:  CIPRO                    Where to Get Your Medications      Information about where to get these medications is not yet available     ! Ask your nurse or doctor about these medications    - amoxicillin-clavulanate 875-125 MG Tabs  - phenazopyridine 200 MG Tabs  - phosphorus 155-852-130 MG tablet            Instructions           Diet / Nutrition:    Follow any diet instructions given to you by your doctor or the dietician, including how much salt (sodium) you are allowed each day.    If you are overweight, talk to your doctor about a weight reduction plan.    Activity:    Remain physically active following your doctor's instructions about exercise and activity.    Rest often.     Any time you become even a little tired or short of breath, SIT DOWN and rest.    Worsening Symptoms:    Report any of the following signs and symptoms to the doctor's office immediately:    *Pain of jaw, arm, or neck  *Chest pain not relieved by medication                               *Dizziness or loss of consciousness  *Difficulty breathing even when at rest   *More tired than usual                                       *Bleeding drainage or swelling of surgical site  *Swelling of feet, ankles, legs or stomach                 *Fever (>100ºF)  *Pink or blood tinged sputum  *Weight gain (3lbs/day or 5lbs /week)           *Shock from internal defibrillator (if applicable)  *Palpitations or irregular heartbeats                *Cool and/or numb extremities    Stroke Awareness    Common Risk Factors for Stroke include:    Age  Atrial Fibrillation  Carotid Artery  Stenosis  Diabetes Mellitus  Excessive alcohol consumption  High blood pressure  Overweight   Physical inactivity  Smoking    Warning signs and symptoms of a stroke include:    *Sudden numbness or weakness of the face, arm or leg (especially on one side of the body).  *Sudden confusion, trouble speaking or understanding.  *Sudden trouble seeing in one or both eyes.  *Sudden trouble walking, dizziness, loss of balance or coordination.Sudden severe headache with no known cause.    It is very important to get treatment quickly when a stroke occurs. If you experience any of the above warning signs, call 911 immediately.                   Disclaimer         Quit Smoking / Tobacco Use:    I understand the use of any tobacco products increases my chance of suffering from future heart disease or stroke and could cause other illnesses which may shorten my life. Quitting the use of tobacco products is the single most important thing I can do to improve my health. For further information on smoking / tobacco cessation call a Toll Free Quit Line at 1-978.261.5662 (*National Cancer [place]) or 1-965.275.1269 (American Lung Association) or you can access the web based program at www.lungXenon Arc.org.    [place] Tobacco Users Help Line:  (656) 981-5593       Toll Free: 1-551.522.4799  Quit Tobacco Program [facility] (339)278-4262    Crisis Hotline:    [place] Crisis Hotline:  8-303-SGMWDRV or 1-985.141.6336    [place] Crisis Hotline:    1-153.918.6165 or 647-747-5364    Discharge Survey:   Thank you for choosing [facility]. We hope we did everything we could to make your hospital stay a pleasant one. You may be receiving a phone survey and we would appreciate your time and participation in answering the questions. Your input is very valuable to us in our efforts to improve our service to our patients and their families.        My signature on this form indicates that:    1. I have reviewed and understand the  above information.  2. My questions regarding this information have been answered to my satisfaction.  3. I have formulated a plan with my discharge nurse to obtain my prescribed medications for home.                  Disclaimer         __________________________________                     __________       ________                       Patient Signature                                                 Date                    Time

## 2017-07-06 NOTE — PROGRESS NOTES
NIHSS    1a. Level of Consciousness  0 Alert. Keenly Responsive  1 Not Alert but arousable by minor stimulation  2 Not Alert. Requires repeated stimulation  3 Responds only w reflex motor/autonomic effects or totally unresponsive  Score:0  1b. Level of Consciousness: ask month and age  0 Answers both questions correctly  1 Answers one question correctly  2 Answers neither question correctly  Score:0  1c. Level of Consciousness: ask to open and close eyes/make a fist with non-paretic hand  0 Performs both tasks correctly  1 Performs one task correctly  2 Performs neither task correctly  Score:0  2. Best gaze: Horizontal eye movement   0 Normal   1 Partial gaze palsy. Forced deviation or total palsy not present   2 Forced deviation. Not overcome by oculo-cephalic maneuver   Score:0  3. Visual Fields   0 No loss   1 Partial hemianopia   2 Complete hemianopia   3 Bilateral hemianopia. Blindness   Score:0  4. Facial Palsy   0 Normal symmetrical movements   1 Minor paralysis   2 Partial paralysis   3 Complete paralysis   Score:0  5. Motor Arm Right and Left   0 No drift   1 Drift but does not hit bed   2 Some effort against gravity. Hits bed   3 No effort against gravity   4 No movement   UN Amputation/joint fusion/explain   Score R:0 Score L:0  6. Motor Leg: Right and Left   0 No drift   1 Drift but does not hit bed   2 Some effort against gravity. Hits bed   3 No effort against gravity   4 No movement   UN Amputation/joint fusion/explain   Score R :0   Score L :0  7. Limb Ataxia. Finger to nose/ shin-heel   0 Absent   1  Present in one limb   2 Present in two limbs   UN Amputation/joint fusion explain   Score:0  8. Sensory. Pinprick   0 Normal. No sensory loss   1 Mild to moderate sensory loss   2 Severe or total sensory loss   Score:0  9. Best language. Show the kitchen/cookie picture   0 No aphasia   1 Mild to moderate aphasia   2 Severe aphasia   3 Mute. Global aphasia   Score:0  10. Dysarthria.  Show the reading  list   0 Normal   1 Mild to moderate dysarthria   2 Severe dysarthria   3 Intubated or other physical barrier   Score:0  11. Extinction and Inattention   0 No abnormality   1 Extinction to simultaneous stimulation   2 Profound Atul-inattention or extinction   Score:0    TOTAL: 0    Presumed Mechanism by TOAST:    ___Large Artery    ___Small Vessel (Lacunar)    ___Cardioembolic    ___ Other (Sickle Cell Disease, Vasculitis, Hypercoagulable State, etc)    ___Unknown

## 2017-07-06 NOTE — ED NOTES
Pt bib EMS, diverted by [id number]. Pt reports headache since 10 pm last night. Pt was seen at ER in [place] this am for abdominal pain and was dx with  UTI. Pt reports he went down for a nap at 1300 and woke up at 1400 with difficulty speaking. FSBS 126. Speech clear on arrival, LIZ, no neuro deficits noted.

## 2017-07-06 NOTE — CONSULTS
"Date of Service:  7/6/17    PCP: Brendan Hargrove M.D.    CC:  Code stroke    HPI: This is a 67 y.o. male with hx prostatectomy noted UTI sx this am.  Went to [facility], given antibiotics.  Went home, took a nap at 1pm.  Awakened at 2pm, felt confused, had difficulty communicating with his wife.  Now resolved.    ROS: As above. The remainder of a complete review of systems is negative in all systems except as noted.    PMHx:  Active Ambulatory Problems     Diagnosis Date Noted   • Right inguinal hernia 09/30/2016     Resolved Ambulatory Problems     Diagnosis Date Noted   • No Resolved Ambulatory Problems     Past Medical History   Diagnosis Date   • Urinary incontinence    • Arthritis    • Cancer ([facility]) 2016   • Bowel habit changes        SHx:  Social History     Social History   • Marital Status:      Spouse Name: N/A   • Number of Children: N/A   • Years of Education: N/A     Occupational History   • Not on file.     Social History Main Topics   • Smoking status: Never Smoker    • Smokeless tobacco: Never Used   • Alcohol Use: No   • Drug Use: No   • Sexual Activity: Not on file     Other Topics Concern   • Not on file     Social History Narrative       FHx:  family history is not on file.    Allergies:  No Known Allergies    Medications:  No current facility-administered medications on file prior to encounter.     Current Outpatient Prescriptions on File Prior to Encounter   Medication Sig Dispense Refill   • atorvastatin (LIPITOR) 40 MG Tab Take 40 mg by mouth every 48 hours.     • aspirin EC (ECOTRIN) 81 MG Tablet Delayed Response Take 81 mg by mouth every day.     • ibuprofen (MOTRIN) 200 MG Tab Take 400 mg by mouth 2 times a day as needed.         Objective Exam:  Filed Vitals:    07/06/17 1546 07/06/17 1550   BP:  125/79   Pulse:  95   Temp: 37.6 °C (99.7 °F)    Resp:  16   Height: 1.88 m (6' 2.02\")    Weight: 79.379 kg (175 lb)    SpO2:  95%       General: Alert, O x 3.  Conversant.  HEENT: PERRL, " EOM are full, VF full.  Neuro: Motor is =, sensation is =.  DTR =, toes downgoing.  CN intact.    Laboratory--reviewed personally and are as follows:  Lab Results   Component Value Date/Time    WBC 12.5* 07/06/2017 03:20 PM    RBC 4.77 07/06/2017 03:20 PM    HEMOGLOBIN 13.8* 07/06/2017 03:20 PM    HEMATOCRIT 40.8* 07/06/2017 03:20 PM    MCV 85.5 07/06/2017 03:20 PM    MCH 28.9 07/06/2017 03:20 PM    MCHC 33.8 07/06/2017 03:20 PM    MPV 9.7 07/06/2017 03:20 PM    NEUTROPHILS-POLYS 88.40* 07/06/2017 03:20 PM    LYMPHOCYTES 4.10* 07/06/2017 03:20 PM    MONOCYTES 6.90 07/06/2017 03:20 PM    EOSINOPHILS 0.00 07/06/2017 03:20 PM    BASOPHILS 0.20 07/06/2017 03:20 PM      Lab Results   Component Value Date/Time    SODIUM 140 09/29/2016 08:37 AM    POTASSIUM 4.0 09/29/2016 08:37 AM    CHLORIDE 107 09/29/2016 08:37 AM    CO2 27 09/29/2016 08:37 AM    GLUCOSE 75 09/29/2016 08:37 AM    BUN 19 09/29/2016 08:37 AM    CREATININE 0.84 09/29/2016 08:37 AM      No results found for: PROTHROMBTM, INR     Radiology  reviewed    MDM:  67 y.o. male here with episode of confusion, resolved.  Exam is nl, episode not consistent with a stroke.  Not a tpa candidate.  Will check MRI and ECHO.    Assessment/Plan:  Active Problems:    * No active hospital problems. *  Resolved Problems:    * No resolved hospital problems. *

## 2017-07-07 ENCOUNTER — APPOINTMENT (OUTPATIENT)
Dept: RADIOLOGY | Facility: MEDICAL CENTER | Age: 67
DRG: 871 | End: 2017-07-07
Attending: SPECIALIST
Payer: MEDICARE

## 2017-07-07 ENCOUNTER — APPOINTMENT (OUTPATIENT)
Dept: RADIOLOGY | Facility: MEDICAL CENTER | Age: 67
DRG: 871 | End: 2017-07-07
Attending: INTERNAL MEDICINE
Payer: MEDICARE

## 2017-07-07 ENCOUNTER — APPOINTMENT (OUTPATIENT)
Dept: RADIOLOGY | Facility: MEDICAL CENTER | Age: 67
DRG: 871 | End: 2017-07-07
Attending: UROLOGY
Payer: MEDICARE

## 2017-07-07 PROBLEM — Z90.79 HISTORY OF PROSTATECTOMY: Status: ACTIVE | Noted: 2017-07-07

## 2017-07-07 PROBLEM — N13.30 HYDRONEPHROSIS OF LEFT KIDNEY: Status: ACTIVE | Noted: 2017-07-07

## 2017-07-07 PROBLEM — G93.41 ACUTE METABOLIC ENCEPHALOPATHY: Status: ACTIVE | Noted: 2017-07-07

## 2017-07-07 PROBLEM — N39.0 UTI (URINARY TRACT INFECTION): Status: ACTIVE | Noted: 2017-07-07

## 2017-07-07 PROBLEM — N20.0 RENAL STONES: Status: ACTIVE | Noted: 2017-07-07

## 2017-07-07 LAB
ANION GAP SERPL CALC-SCNC: 6 MMOL/L (ref 0–11.9)
BASOPHILS # BLD AUTO: 0.1 % (ref 0–1.8)
BASOPHILS # BLD: 0.02 K/UL (ref 0–0.12)
BUN SERPL-MCNC: 14 MG/DL (ref 8–22)
CALCIUM SERPL-MCNC: 8.2 MG/DL (ref 8.5–10.5)
CHLORIDE SERPL-SCNC: 107 MMOL/L (ref 96–112)
CHOLEST SERPL-MCNC: 166 MG/DL (ref 100–199)
CO2 SERPL-SCNC: 23 MMOL/L (ref 20–33)
CREAT SERPL-MCNC: 0.98 MG/DL (ref 0.5–1.4)
EOSINOPHIL # BLD AUTO: 0 K/UL (ref 0–0.51)
EOSINOPHIL NFR BLD: 0 % (ref 0–6.9)
ERYTHROCYTE [DISTWIDTH] IN BLOOD BY AUTOMATED COUNT: 44.1 FL (ref 35.9–50)
EST. AVERAGE GLUCOSE BLD GHB EST-MCNC: 117 MG/DL
EST. AVERAGE GLUCOSE BLD GHB EST-MCNC: 117 MG/DL
GFR SERPL CREATININE-BSD FRML MDRD: >60 ML/MIN/1.73 M 2
GLUCOSE SERPL-MCNC: 113 MG/DL (ref 65–99)
HBA1C MFR BLD: 5.7 % (ref 0–5.6)
HBA1C MFR BLD: 5.7 % (ref 0–5.6)
HCT VFR BLD AUTO: 39.9 % (ref 42–52)
HDLC SERPL-MCNC: 41 MG/DL
HGB BLD-MCNC: 13.2 G/DL (ref 14–18)
IMM GRANULOCYTES # BLD AUTO: 0.05 K/UL (ref 0–0.11)
IMM GRANULOCYTES NFR BLD AUTO: 0.4 % (ref 0–0.9)
LACTATE BLD-SCNC: 1.2 MMOL/L (ref 0.5–2)
LDLC SERPL CALC-MCNC: 114 MG/DL
LV EJECT FRACT  99904: 75
LV EJECT FRACT MOD 2C 99903: 85.41
LV EJECT FRACT MOD 4C 99902: 65.67
LV EJECT FRACT MOD BP 99901: 76.74
LYMPHOCYTES # BLD AUTO: 0.94 K/UL (ref 1–4.8)
LYMPHOCYTES NFR BLD: 6.8 % (ref 22–41)
MCH RBC QN AUTO: 28.9 PG (ref 27–33)
MCHC RBC AUTO-ENTMCNC: 33.1 G/DL (ref 33.7–35.3)
MCV RBC AUTO: 87.3 FL (ref 81.4–97.8)
MONOCYTES # BLD AUTO: 1.17 K/UL (ref 0–0.85)
MONOCYTES NFR BLD AUTO: 8.5 % (ref 0–13.4)
NEUTROPHILS # BLD AUTO: 11.61 K/UL (ref 1.82–7.42)
NEUTROPHILS NFR BLD: 84.2 % (ref 44–72)
NRBC # BLD AUTO: 0 K/UL
NRBC BLD AUTO-RTO: 0 /100 WBC
PLATELET # BLD AUTO: 132 K/UL (ref 164–446)
PMV BLD AUTO: 9.9 FL (ref 9–12.9)
POTASSIUM SERPL-SCNC: 4 MMOL/L (ref 3.6–5.5)
RBC # BLD AUTO: 4.57 M/UL (ref 4.7–6.1)
SODIUM SERPL-SCNC: 136 MMOL/L (ref 135–145)
TRIGL SERPL-MCNC: 55 MG/DL (ref 0–149)
WBC # BLD AUTO: 13.8 K/UL (ref 4.8–10.8)

## 2017-07-07 PROCEDURE — C1769 GUIDE WIRE: HCPCS | Performed by: UROLOGY

## 2017-07-07 PROCEDURE — 700111 HCHG RX REV CODE 636 W/ 250 OVERRIDE (IP)

## 2017-07-07 PROCEDURE — 700102 HCHG RX REV CODE 250 W/ 637 OVERRIDE(OP): Performed by: INTERNAL MEDICINE

## 2017-07-07 PROCEDURE — 70551 MRI BRAIN STEM W/O DYE: CPT

## 2017-07-07 PROCEDURE — 83605 ASSAY OF LACTIC ACID: CPT

## 2017-07-07 PROCEDURE — 700105 HCHG RX REV CODE 258: Performed by: INTERNAL MEDICINE

## 2017-07-07 PROCEDURE — A4357 BEDSIDE DRAINAGE BAG: HCPCS | Performed by: UROLOGY

## 2017-07-07 PROCEDURE — 36415 COLL VENOUS BLD VENIPUNCTURE: CPT

## 2017-07-07 PROCEDURE — 160035 HCHG PACU - 1ST 60 MINS PHASE I: Performed by: UROLOGY

## 2017-07-07 PROCEDURE — 501329 HCHG SET, CYSTO IRRIG Y TUR: Performed by: UROLOGY

## 2017-07-07 PROCEDURE — 770006 HCHG ROOM/CARE - MED/SURG/GYN SEMI*

## 2017-07-07 PROCEDURE — 93306 TTE W/DOPPLER COMPLETE: CPT

## 2017-07-07 PROCEDURE — 99233 SBSQ HOSP IP/OBS HIGH 50: CPT | Performed by: INTERNAL MEDICINE

## 2017-07-07 PROCEDURE — 160009 HCHG ANES TIME/MIN: Performed by: UROLOGY

## 2017-07-07 PROCEDURE — 160028 HCHG SURGERY MINUTES - 1ST 30 MINS LEVEL 3: Performed by: UROLOGY

## 2017-07-07 PROCEDURE — 700101 HCHG RX REV CODE 250

## 2017-07-07 PROCEDURE — 80061 LIPID PANEL: CPT

## 2017-07-07 PROCEDURE — A9270 NON-COVERED ITEM OR SERVICE: HCPCS | Performed by: INTERNAL MEDICINE

## 2017-07-07 PROCEDURE — 700111 HCHG RX REV CODE 636 W/ 250 OVERRIDE (IP): Performed by: INTERNAL MEDICINE

## 2017-07-07 PROCEDURE — 93306 TTE W/DOPPLER COMPLETE: CPT | Mod: 26 | Performed by: INTERNAL MEDICINE

## 2017-07-07 PROCEDURE — 160002 HCHG RECOVERY MINUTES (STAT): Performed by: UROLOGY

## 2017-07-07 PROCEDURE — 76775 US EXAM ABDO BACK WALL LIM: CPT

## 2017-07-07 PROCEDURE — 160039 HCHG SURGERY MINUTES - EA ADDL 1 MIN LEVEL 3: Performed by: UROLOGY

## 2017-07-07 PROCEDURE — 502240 HCHG MISC OR SUPPLY RC 0272: Performed by: UROLOGY

## 2017-07-07 PROCEDURE — 160048 HCHG OR STATISTICAL LEVEL 1-5: Performed by: UROLOGY

## 2017-07-07 PROCEDURE — BT141ZZ FLUOROSCOPY OF KIDNEYS, URETERS AND BLADDER USING LOW OSMOLAR CONTRAST: ICD-10-PCS | Performed by: UROLOGY

## 2017-07-07 PROCEDURE — 85025 COMPLETE CBC W/AUTO DIFF WBC: CPT

## 2017-07-07 PROCEDURE — C2617 STENT, NON-COR, TEM W/O DEL: HCPCS | Performed by: UROLOGY

## 2017-07-07 PROCEDURE — 83036 HEMOGLOBIN GLYCOSYLATED A1C: CPT

## 2017-07-07 PROCEDURE — 80048 BASIC METABOLIC PNL TOTAL CA: CPT

## 2017-07-07 PROCEDURE — 500879 HCHG PACK, CYSTO: Performed by: UROLOGY

## 2017-07-07 PROCEDURE — 0T778DZ DILATION OF LEFT URETER WITH INTRALUMINAL DEVICE, VIA NATURAL OR ARTIFICIAL OPENING ENDOSCOPIC: ICD-10-PCS | Performed by: UROLOGY

## 2017-07-07 DEVICE — STENT UROLOGICAL POLARIS 6X28  ULTRA: Type: IMPLANTABLE DEVICE | Site: URETER | Status: FUNCTIONAL

## 2017-07-07 RX ORDER — DIPHENHYDRAMINE HCL 25 MG
25 TABLET ORAL EVERY 6 HOURS PRN
Status: DISCONTINUED | OUTPATIENT
Start: 2017-07-07 | End: 2017-07-09 | Stop reason: HOSPADM

## 2017-07-07 RX ORDER — PHENAZOPYRIDINE HYDROCHLORIDE 200 MG/1
200 TABLET, FILM COATED ORAL 3 TIMES DAILY PRN
Status: DISCONTINUED | OUTPATIENT
Start: 2017-07-07 | End: 2017-07-09 | Stop reason: HOSPADM

## 2017-07-07 RX ADMIN — SODIUM CHLORIDE: 9 INJECTION, SOLUTION INTRAVENOUS at 15:42

## 2017-07-07 RX ADMIN — PIPERACILLIN SODIUM AND TAZOBACTAM SODIUM 3.38 G: 3; .375 INJECTION, POWDER, FOR SOLUTION INTRAVENOUS at 17:40

## 2017-07-07 RX ADMIN — ACETAMINOPHEN 650 MG: 325 TABLET, FILM COATED ORAL at 14:03

## 2017-07-07 RX ADMIN — ENOXAPARIN SODIUM 40 MG: 100 INJECTION SUBCUTANEOUS at 08:28

## 2017-07-07 RX ADMIN — DIPHENHYDRAMINE HCL 25 MG: 25 TABLET ORAL at 14:21

## 2017-07-07 RX ADMIN — SODIUM CHLORIDE: 9 INJECTION, SOLUTION INTRAVENOUS at 06:43

## 2017-07-07 RX ADMIN — PIPERACILLIN SODIUM AND TAZOBACTAM SODIUM 3.38 G: 3; .375 INJECTION, POWDER, FOR SOLUTION INTRAVENOUS at 15:42

## 2017-07-07 RX ADMIN — ACETAMINOPHEN 650 MG: 325 TABLET, FILM COATED ORAL at 04:50

## 2017-07-07 RX ADMIN — CEFTRIAXONE SODIUM 2 G: 2 INJECTION, POWDER, FOR SOLUTION INTRAMUSCULAR; INTRAVENOUS at 12:36

## 2017-07-07 RX ADMIN — ASPIRIN 81 MG: 81 TABLET, CHEWABLE ORAL at 08:28

## 2017-07-07 ASSESSMENT — ENCOUNTER SYMPTOMS
PALPITATIONS: 0
ABDOMINAL PAIN: 0
MYALGIAS: 0
HEARTBURN: 0
SHORTNESS OF BREATH: 0
COUGH: 0
VOMITING: 0
HALLUCINATIONS: 0
DEPRESSION: 0
FOCAL WEAKNESS: 0
BLOOD IN STOOL: 0
DIARRHEA: 0
NAUSEA: 0
BACK PAIN: 0
FEVER: 1
CHILLS: 0
DIZZINESS: 1
HEADACHES: 0
WEAKNESS: 1
SORE THROAT: 0

## 2017-07-07 ASSESSMENT — PAIN SCALES - GENERAL
PAINLEVEL_OUTOF10: 0
PAINLEVEL_OUTOF10: 1
PAINLEVEL_OUTOF10: 0

## 2017-07-07 ASSESSMENT — LIFESTYLE VARIABLES: DO YOU DRINK ALCOHOL: NO

## 2017-07-07 NOTE — PROGRESS NOTES
Patient up to unit from ER, awake and alert.  Oriented to room, call light and safety situation.  C/o headache, medicated per MAR.  Needs addressed at this time, will monitor.

## 2017-07-07 NOTE — ED NOTES
Called report to floor RN. Pt is aware of POC. Pt belongings are on bed. Pt is ready for transport.

## 2017-07-07 NOTE — CARE PLAN
Problem: Knowledge Deficit  Goal: Knowledge of disease process/condition, treatment plan, diagnostic tests, and medications will improve  Intervention: Explain information regarding disease process/condition, treatment plan, diagnostic tests, and medications and document in education  Patient updated on plan of care, verbalized understanding      Problem: Pain Management  Goal: Pain level will decrease to patient’s comfort goal  Intervention: Follow pain managment plan developed in collaboration with patient and Interdisciplinary Team  Medicated per MAR for pain

## 2017-07-07 NOTE — ASSESSMENT & PLAN NOTE
Causing hydronephrosis, recurrent/refractory UTI  -Now s/p cystoscopy w L ureteral stent placement  -Dr. Garcia' services appreciated  -Pain is controlled (IV morphine/oxy PRN)

## 2017-07-07 NOTE — ED NOTES
Received report from Mika REYES. Pt care responsibilities assumed. Pt resting in bed, Monitors in place, VSS, will continue to monitor.

## 2017-07-07 NOTE — CARE PLAN
Problem: Fluid Volume:  Goal: Will maintain balanced intake and output  Monitoring I&O's, pt uses urinal.     Problem: Communication  Goal: The ability to communicate needs accurately and effectively will improve  Verbally communicates needs/concerns. Uses call light effectively.

## 2017-07-07 NOTE — ASSESSMENT & PLAN NOTE
Due to urinary source, left renal stone causing refractory UTI.  Failed outpatient treatment  -Sepsis protocol  -Follow cultures  -Lactate ok, HD stable  -Continue Zosyn (got rash w ceftriaxone)

## 2017-07-07 NOTE — PROGRESS NOTES
Received bedside report from Tiarra RN, Pt assessed, A&Ox4, Vitals stable, pt has 0/10 of pain, Lung sounds clear, no SOB Noted.  Pt updated on plan of care and goals for today, Call light within reach, personal belongings within reach.  Bed in lowest position, Bed Strip alarm on.  Pt ambulates self.

## 2017-07-07 NOTE — PROGRESS NOTES
Patient resting in bed, assessment and admit complete, awake and alert.  Needs addressed, call light in reach, will monitor.

## 2017-07-07 NOTE — PROGRESS NOTES
Patient resting in bed, no distress noted, call light in reach, will monitor.  Monitor summary: SR 67-85, 16/10/35.

## 2017-07-07 NOTE — PROGRESS NOTES
MD aware. Pt c/o slight nausea. Skin on the upper chest and face is red. Tylenol is given for the reaction. Ice packs on patient's chest for the fever. Will give Benadryl and Zosyn per new orders.

## 2017-07-07 NOTE — PROGRESS NOTES
[facility] Hospitalist Progress Note    Date of Service: 2017    Chief Complaint  67 y.o. male w hx of prostatectomy (now incontinent) and frequent UTIs over last 3mo, admitted 2017 with AMS and weakness found to have sepsis due to UTI.  US  showed L stone w hydro    Interval Problem Update  : US shows left hydro/stone.  Urology consulted.  Echo normal, MRI normal    Consultants/Specialty  Dr. Garcia - Urology  Dr. Sabillon - Neurology    Disposition  F/U Urine cx  Urology recs re: stone        Review of Systems   Constitutional: Positive for fever and malaise/fatigue. Negative for chills.   HENT: Negative for sore throat.    Respiratory: Negative for cough and shortness of breath.    Cardiovascular: Negative for chest pain and palpitations.   Gastrointestinal: Negative for heartburn, nausea, vomiting, abdominal pain, diarrhea and blood in stool.   Genitourinary: Negative for dysuria and frequency.   Musculoskeletal: Negative for myalgias and back pain.   Neurological: Positive for dizziness (After ceftriaxone) and weakness. Negative for focal weakness and headaches.   Psychiatric/Behavioral: Negative for depression and hallucinations.   All other systems reviewed and are negative.     Physical Exam  Laboratory/Imaging   Hemodynamics  Temp (24hrs), Av.8 °C (100.1 °F), Min:36.7 °C (98 °F), Max:40.4 °C (104.8 °F)   Temperature: 37.1 °C (98.8 °F)  Pulse  Av.7  Min: 64  Max: 96 Heart Rate (Monitored): 95  Blood Pressure : 100/56 mmHg, NIBP: 110/64 mmHg      Respiratory      Respiration: 16, Pulse Oximetry: 91 %             Fluids    Intake/Output Summary (Last 24 hours) at 17 1618  Last data filed at 17 1400   Gross per 24 hour   Intake   2240 ml   Output   1625 ml   Net    615 ml       Nutrition  Orders Placed This Encounter   Procedures   • DIET NPO     Standing Status: Standing      Number of Occurrences: 1      Standing Expiration Date:      Order Specific Question:  Restrict to:      Answer:  Sips with Medications [3]     Physical Exam   Constitutional: He is oriented to person, place, and time. He appears well-developed and well-nourished. No distress.   HENT:   Head: Normocephalic.   Mouth/Throat: No oropharyngeal exudate.   Eyes: Conjunctivae are normal. Pupils are equal, round, and reactive to light.   Neck: Normal range of motion. No tracheal deviation present.   Cardiovascular: Normal rate and regular rhythm.    No murmur heard.  Pulmonary/Chest: Effort normal. No respiratory distress. He has no wheezes. He exhibits no tenderness.   Abdominal: Soft. He exhibits no distension. There is tenderness (Mild, suprapubic). There is no guarding.   No CVA tenderness   Musculoskeletal: Normal range of motion. He exhibits no edema or tenderness.   Lymphadenopathy:     He has no cervical adenopathy.   Neurological: He is alert and oriented to person, place, and time. No cranial nerve deficit.   Skin: Skin is warm and dry. No rash noted.   Psychiatric: He has a normal mood and affect. His behavior is normal.   Nursing note and vitals reviewed.      Recent Labs      07/06/17   1520  07/07/17   0216   WBC  12.5*  13.8*   RBC  4.77  4.57*   HEMOGLOBIN  13.8*  13.2*   HEMATOCRIT  40.8*  39.9*   MCV  85.5  87.3   MCH  28.9  28.9   MCHC  33.8  33.1*   RDW  43.1  44.1   PLATELETCT  152*  132*   MPV  9.7  9.9     Recent Labs      07/06/17   1520  07/07/17   0216   SODIUM  135  136   POTASSIUM  3.6  4.0   CHLORIDE  107  107   CO2  19*  23   GLUCOSE  132*  113*   BUN  14  14   CREATININE  0.86  0.98   CALCIUM  8.8  8.2*     Recent Labs      07/06/17   1520   APTT  28.6   INR  1.12         Recent Labs      07/07/17   0216   TRIGLYCERIDE  55   HDL  41   LDL  114*          Assessment/Plan     * Complicated UTI (urinary tract infection)  Assessment & Plan  Due to male patient, hx of prostatectomy, presence of stone  -Zosyn, needs 7d treatment    Sepsis (CMS-HCC)  Assessment & Plan  Due to urinary source, left renal  stone causing refractory UTI.  Failed outpatient treatment  -Sepsis protocol  -Follow cultures  -Lactate ok, HD stable  -Change ctx to Zosyn (rash w ceftriaxone, no known PCN allergy)    History of prostatectomy  Assessment & Plan  Seen Dr. Christopher in the past  -Urology consult  -Incontinence    Hydronephrosis of left kidney  Assessment & Plan  Due to L renal stone.  Has hx of stones but never required intervention.  Has hx of prostatectomy and has seen Dr. Christopher in the past for this.  Has recurrent/refractory UTI and now sepsis  -Dr. Garcia consulted  -Likely stent vs lithotripsy  -Zosyn  -Renal function is ok      Renal stone, left  Assessment & Plan  Causing hydronephrosis, recurrent/refractory UTI  -Dr. Garcia consulted    Radiology images reviewed, Medications reviewed and Labs reviewed  Lucas catheter: No Lucas      DVT Prophylaxis: Enoxaparin (Lovenox)      Antibiotics: Treating active infection/contamination beyond 24 hours perioperative coverage

## 2017-07-07 NOTE — ASSESSMENT & PLAN NOTE
Due to L renal stone.  Has hx of stones but never required intervention.  Has hx of prostatectomy and has seen Dr. Christopher in the past for this.  Has recurrent/refractory UTI and now sepsis  -S/P L ureteral stent (7/7)  -Dr. Garcia following  -Zosyn  -Renal function is ok  -Repeat in AM

## 2017-07-07 NOTE — PROGRESS NOTES
"Pt calls this RN in the room with c/o feeling dizzy and hot. States \"this is how I felt about 1-2 hours after Rocephin yesterday when they gave it to me in [facility].\" Rocephin is now infused and is back to NS IV.   VS - Temp 104.8F, /70, HR 82, O2 at 91% on room air.   Took covers off patient. Awaiting to discuss this with MD since she is coming to floor.   "

## 2017-07-07 NOTE — PROGRESS NOTES
Alert, conversant.  Language is nl.  No drift, motor is =.  MRI shows no acute changes.  Episode of confusion, resolved, not consistent with a stroke.  Will sign off.

## 2017-07-07 NOTE — ASSESSMENT & PLAN NOTE
Due to male patient, hx of prostatectomy, presence of stone  -Zosyn, needs 7d treatment (total)  -Likely change to PO in am if remains afebrile

## 2017-07-07 NOTE — ASSESSMENT & PLAN NOTE
Due to Sepsis, he had no focal deficits.  Stroke workup was initiated as was neurology consultation  -ASA 81  -MRI negative  -All deficits resolved  -Neuro signed off

## 2017-07-07 NOTE — H&P
[facility] MEDICINE ADMISSION H & P    Name: Carl Finney  MRN: [id number]  : 1950  Admit Date: 2017  Admitting Provider: Fred Self M.D.  Primary Care Physician: Brendan Hargrove M.D.      CHIEF COMPLAINT: Altered level of consciousness    HISTORY OF PRESENT ILLNESS: This is a 67-year-old male with history of prostatectomy back in 2016 and follows urology, along with inguinal hernia. He was having recurrent issues with his prostate and hematuria, and was diagnosed with urinary tract infection improved with multiple courses of antibiotics including ciprofloxacin, however his symptoms always recur after several days off antibiotics.    Yesterday, he started to feel sick, with nausea, and fever of 101.5°F, along with diffuse body pains. He denied any hematuria, although he did have slight dysuria and burning with urination. He went to [facility] ED, where he was diagnosed with UTI and was given a dose of IV antibiotics and was given a prescription for cephalexin which he has not picked up yet. However, after being discharged from the ED in the afternoon, after taking a nap he woke up with high fever, and had difficulty talking described as difficulty finding words and difficulty communicating with his wife. His wife did not notice any confusion, or speech slurring, although he was visibly shaking with chills. He also started to complain of left lower quadrant and left flank pain, which she rated 7 out of 10 in severity, sharp, and waxing and waning, along with one episode of vomiting.    ED COURSE: The patient was initially evaluated in the emergency department, was maintaining good hemodynamics, saturating well on room air, and was afebrile. He was initially brought in as a code stroke, but neurology did not think that he is having a stroke, nor is a candidate for TPA. Initial blood workup showed WBC of 12,500 with left shift but no bandemia, with bicarbonate of 19  and anion gap 9. Troponin was negative. Head CT did not show any acute intracranial pathology, with normal CT angiogram of the head and neck. Chest x-ray (my reading) showed linear atelectasis in the left lower base. He was subsequent admitted to the hospitalist service.    REVIEW OF SYSTEMS  A complete review of system was done. All other systems were negative.    PMH/PSH/FMH: I personally reviewed all ancillary histories as noted.    PAST MEDICAL HISTORY:  Past Medical History   Diagnosis Date   • Urinary incontinence    • Arthritis      gout   • Cancer (CMS-HCC) 2016     prostate   • Bowel habit changes      IBS       PAST SURGICAL HISTORY:  Past Surgical History   Procedure Laterality Date   • Other abdominal surgery  3/9/2016     prostatectomy, Dr Christopher [facility]   • Other orthopedic surgery  1993     ACL left knee   • Inguinal hernia repair Right 9/30/2016     Procedure: INGUINAL HERNIA REPAIR;  Surgeon: Lisette Leos M.D.;  Location: SURGERY [facility];  Service:        PERSONAL/SOCIAL HISTORY:  Social History   Substance Use Topics   • Smoking status: Never Smoker    • Smokeless tobacco: Never Used   • Alcohol Use: No       FAMILY MEDICAL HISTORY:  Reviewed. Non-contributory.    ALLERGIES:  Review of patient's allergies indicates no known allergies.    HOME MEDICATIONS:  Prior to Admission medications    Medication Sig Start Date End Date Taking? Authorizing Provider   aspirin (ASA) 325 MG Tab Take 650 mg by mouth every 6 hours as needed (headache).   Yes Not In System Provider   ciprofloxacin (CIPRO) 500 MG Tab Take 500 mg by mouth 2 times a day. 10 day course, finished >2 weeks ago. Reported on 7/6/17   Yes Not In System Provider   aspirin (ASA) 81 MG Chew Tab chewable tablet Take 81 mg by mouth every day.   Yes Not In System Provider           PHYSICAL EXAMINATION:  VITALS: Blood pressure 135/71, heart rate 73, respiratory rate 17, oxygen saturation 93% on room air, temperature  37.6°C  CONSTITUTIONAL: (-) diaphoresis, (-) distress, (+) mild rigors  HENT: Normocephalic, atraumatic, (-) tonsillopharyngal congestion or exudate.  EYES: PERRLA, pink conjuctivae, (-) icteric sclerae  NECK: (-) cervical lymphadenopathy, (-) neck rigidity  CARDIOVASCULAR: Distinct heart sounds, RRR, (-) murmurs, rubs, gallops, (-) LE edema  RESPIRATORY: Equal chest expansion, clear breath sounds, (-) crackles/wheezes/rales/rhonchi  GASTROINTESTINAL: normoactive bowel sounds, soft, (+) left lower quadrant and left CVA tenderness, (-) masses, (-) guarding/rebound  MUSCULOSKELETAL: (-) joint swelling/tenderness, (-) joint deformities, (-) muscle tenderness,   (-) gross limitation of movement of 4 extremities  SKIN: (-) erythema, warmth, rashes, ulcers, open wounds. (+) Warm to touch  PSYCHIATRIC: mood, affect, and thought content WNL, behavior age appropriate  NEUROLOGIC: Non-focal, moves all 4 extremities, sensory grossly intact. No dysarthria/speech slurring.       PERTINENT DIAGNOSTIC RESULTS:  Reviewed, and as mentioned above. Please refer to ED course.    ASSESSMENTS:  1. Transient dysarthria, due to sepsis from UTI/pyelonephritis  2. Sepsis secondary to UTI/possible pyelonephritis  3. History of BPH, prostatectomy    PLANS:  - I would admit him to the telemetry floors. Anticipate that she will need at least 2 midnights hospital stay to provide medically necessary services.   - I would place him on the sepsis protocol with IV fluids starting with bolus then normal saline at 1 25 mL per hour. We will do serial lactate levels, and close hemodynamic monitoring, with as needed boluses for low blood pressure or elevated lactate.  - I would start him on IV ceftriaxone, and sent for urine culture and 2 sets of blood cultures. I will obtain a renal ultrasound to rule out obstruction. We will trend his WBC count, and continue monitoring him for fevers. I would continue him on as needed IV morphine, oral oxycodone, and IV  Toradol for pain, along with as needed antiemetics.  - Highly doubt that he had an acute CVA, with normal neurologic examination. Neurology was consulted and following. Brain MRI, and echocardiogram ordered. We will do neuro checks every 4 hours. Continue aspirin. Check lipid profile and hemoglobin A1c. We will continue to monitor him on telemetry.    DVT PROPHYLAXIS -Lovenox subcutaneously  GI PROPHYLAXIS -not indicated  CODE STATUS -full code

## 2017-07-07 NOTE — CONSULTS
UROLOGY Consult Note:    Mikal Engel  Date & Time note created:    7/7/2017   3:16 PM     Referring MD:  Dr. Cardoza    Patient ID:   Name:             Carl Finney   YOB: 1950  Age:                 67 y.o.  male   MRN:               [id number]                                                             Reason for Consult:      Hydronephrosis, fevers, UTI    History of Present Illness:    68 yo male with history of prostate cancer s/p prostatectomy last March with Dr. Christopher reports intermittent fevers, chills, abdominal and back pain for the past few months. He has been treated multiple times on an outpatient basis with antibiotics for UTIs but never really achieved prolonged relief of his symptoms. He is describing stabbing pain in the right and left upper abdomen that radiates into his back on the left greater than right side. This became more intense along with episodes of gross hematuria, and following development of shaking chills he decided to present to the ER where he has since been admitted. A BORA was completed revealing bilateral hydronephrosis, and in the setting of a UTI, urology was consulted.     Review of Systems:      Full ROS completed and is negative with the exception of that being reported in the HPI    Past Medical History:   Past Medical History   Diagnosis Date   • Urinary incontinence    • Arthritis      gout   • Cancer (CMS-Formerly [facility]) 2016     prostate   • Bowel habit changes      IBS     Active Hospital Problems    Diagnosis   • History of prostatectomy [Z90.79]   • Sepsis (CMS-Formerly [facility]) [A41.9]   • Complicated UTI (urinary tract infection) [N39.0]       Past Surgical History:  Past Surgical History   Procedure Laterality Date   • Other abdominal surgery  3/9/2016     prostatectomy, Dr [facility]   • Other orthopedic surgery  1993     ACL left knee   • Inguinal hernia repair Right 9/30/2016     Procedure: INGUINAL HERNIA REPAIR;  Surgeon: Lisette Leos M.D.;   Location: SURGERY [facility];  Service:        Hospital Medications:    Current facility-administered medications:   •  piperacillin-tazobactam (ZOSYN) 3.375 g in  mL IVPB, 3.375 g, Intravenous, Q6HRS, Sari Cardoza D.O.  •  diphenhydrAMINE (BENADRYL) tablet/capsule 25 mg, 25 mg, Oral, Q6HRS PRN, Sari Cardoza D.OPradip, 25 mg at 07/07/17 1421  •  ketorolac (TORADOL) injection 30 mg, 30 mg, Intravenous, Q6HRS PRN, Fred Self M.D.  •  aspirin (ASA) chewable tab 81 mg, 81 mg, Oral, DAILY, Fred Self M.D., 81 mg at 07/07/17 0828  •  senna-docusate (PERICOLACE or SENOKOT S) 8.6-50 MG per tablet 2 Tab, 2 Tab, Oral, BID **AND** polyethylene glycol/lytes (MIRALAX) PACKET 1 Packet, 1 Packet, Oral, QDAY PRN **AND** magnesium hydroxide (MILK OF MAGNESIA) suspension 30 mL, 30 mL, Oral, QDAY PRN **AND** bisacodyl (DULCOLAX) suppository 10 mg, 10 mg, Rectal, QDAY PRN, Fred Self M.D.  •  Respiratory Care per Protocol, , Nebulization, Continuous RT, Fred Self M.D.  •  NS (BOLUS) infusion 500 mL, 500 mL, Intravenous, Once PRN, Fred Self M.D.  •  NS infusion, , Intravenous, Continuous, Fred Self M.D., Last Rate: 125 mL/hr at 07/07/17 0643  •  enoxaparin (LOVENOX) inj 40 mg, 40 mg, Subcutaneous, DAILY, Fred Self M.D., 40 mg at 07/07/17 0828  •  acetaminophen (TYLENOL) tablet 650 mg, 650 mg, Oral, Q6HRS PRN, Fred Self M.D., 650 mg at 07/07/17 1403  •  Notify provider if pain remains uncontrolled, , , CONTINUOUS **AND** Use the numeric rating scale (NRS-11) on regular floors and Critical-Care Pain Observation Tool (CPOT) on ICUs/Trauma to assess pain, , , CONTINUOUS **AND** Pulse Ox (Oximetry), , , CONTINUOUS **AND** Pharmacy Consult Request ...Pain Management Review, , Other, PRN **AND** If patient difficult to arouse and/or has respiratory depression, stop any opiates that are currently infusing and call a Rapid Response., , , CONTINUOUS **AND** oxycodone immediate-release  "(ROXICODONE) tablet 5 mg, 5 mg, Oral, Q3HRS PRN **AND** oxycodone immediate release (ROXICODONE) tablet 10 mg, 10 mg, Oral, Q3HRS PRN **AND** morphine (pf) 4 mg/ml injection 4 mg, 4 mg, Intravenous, Q3HRS PRN, Fred Self M.D.  •  labetalol (NORMODYNE,TRANDATE) injection 10 mg, 10 mg, Intravenous, Q4HRS PRN, Fred Self M.D.  •  ondansetron (ZOFRAN) syringe/vial injection 4 mg, 4 mg, Intravenous, Q4HRS PRN, Fred Self M.D.  •  ondansetron (ZOFRAN ODT) dispertab 4 mg, 4 mg, Oral, Q4HRS PRN, Fred Self M.D.    Current Outpatient Medications:  Prescriptions prior to admission   Medication Sig Dispense Refill Last Dose   • aspirin (ASA) 325 MG Tab Take 650 mg by mouth every 6 hours as needed (headache).   7/5/2017 at unknown   • ciprofloxacin (CIPRO) 500 MG Tab Take 500 mg by mouth 2 times a day. 10 day course, finished >2 weeks ago. Reported on 7/6/17   >2 weeks at FINISHED   • aspirin (ASA) 81 MG Chew Tab chewable tablet Take 81 mg by mouth every day.   >2 days at unknown       Medication Allergy:  No Known Allergies    Family History:  No family history on file.    Social History:  Social History     Social History   • Marital Status:      Spouse Name: N/A   • Number of Children: N/A   • Years of Education: N/A     Occupational History   • Not on file.     Social History Main Topics   • Smoking status: Never Smoker    • Smokeless tobacco: Never Used   • Alcohol Use: No   • Drug Use: No   • Sexual Activity: Not on file     Other Topics Concern   • Not on file     Social History Narrative         Physical Exam:  Vitals/ General Appearance:   Weight/BMI: Body mass index is 22.15 kg/(m^2).  Blood pressure 139/70, pulse 82, temperature 38.5 °C (101.3 °F), resp. rate 20, height 1.88 m (6' 2.02\"), weight 78.3 kg (172 lb 9.9 oz), SpO2 91 %.  Filed Vitals:    07/07/17 0825 07/07/17 1145 07/07/17 1358 07/07/17 1437   BP: 120/68 136/66 139/70    Pulse: 74 81 82    Temp: 37.2 °C (98.9 °F) 36.7 °C (98 °F) " 40.4 °C (104.8 °F) 38.5 °C (101.3 °F)   Resp: 16 16 20    Height:       Weight:       SpO2: 94% 96% 91%      Oxygen Therapy:  Pulse Oximetry: 91 %, O2 (LPM): 0, O2 Delivery: None (Room Air)    Constitutional:   Well developed, Well nourished, No acute distress  HENMT:  NC/AT.PERRL, Nares patent, throat clear.  Eyes:  PERRL. Sclera white, conjunctiva pink.  Neck:  Supple. FROM.   Cardiovascular:  Normal heart rate, Normal rhythm, No murmurs.  Lungs:  Normal breath sounds, breath sounds clear to auscultation bilaterally,  no crackles, no wheezing.   Abdomen: Bowel sounds normal, Soft, No tenderness, No guarding, No rebound, No masses, No hepatosplenomegaly.  : No CVAT  Skin: Warm, Dry, No erythema, No rash, no induration.  Neurologic: Alert & oriented x 3, No focal deficits noted.  Psychiatric: Affect normal, Judgment normal, Mood normal.      MDM (Data Review):     Records reviewed and summarized in current documentation    Lab Data Review:  Recent Results (from the past 24 hour(s))   PROTHROMBIN TIME    Collection Time: 07/06/17  3:20 PM   Result Value Ref Range    PT 14.8 (H) 12.0 - 14.6 sec    INR 1.12 0.87 - 1.13   APTT    Collection Time: 07/06/17  3:20 PM   Result Value Ref Range    APTT 28.6 24.7 - 36.0 sec   CBC WITH DIFFERENTIAL    Collection Time: 07/06/17  3:20 PM   Result Value Ref Range    WBC 12.5 (H) 4.8 - 10.8 K/uL    RBC 4.77 4.70 - 6.10 M/uL    Hemoglobin 13.8 (L) 14.0 - 18.0 g/dL    Hematocrit 40.8 (L) 42.0 - 52.0 %    MCV 85.5 81.4 - 97.8 fL    MCH 28.9 27.0 - 33.0 pg    MCHC 33.8 33.7 - 35.3 g/dL    RDW 43.1 35.9 - 50.0 fL    Platelet Count 152 (L) 164 - 446 K/uL    MPV 9.7 9.0 - 12.9 fL    Neutrophils-Polys 88.40 (H) 44.00 - 72.00 %    Lymphocytes 4.10 (L) 22.00 - 41.00 %    Monocytes 6.90 0.00 - 13.40 %    Eosinophils 0.00 0.00 - 6.90 %    Basophils 0.20 0.00 - 1.80 %    Immature Granulocytes 0.40 0.00 - 0.90 %    Nucleated RBC 0.00 /100 WBC    Neutrophils (Absolute) 11.07 (H) 1.82 - 7.42  K/uL    Lymphs (Absolute) 0.51 (L) 1.00 - 4.80 K/uL    Monos (Absolute) 0.86 (H) 0.00 - 0.85 K/uL    Eos (Absolute) 0.00 0.00 - 0.51 K/uL    Baso (Absolute) 0.03 0.00 - 0.12 K/uL    Immature Granulocytes (abs) 0.05 0.00 - 0.11 K/uL    NRBC (Absolute) 0.00 K/uL   COMP METABOLIC PANEL    Collection Time: 07/06/17  3:20 PM   Result Value Ref Range    Sodium 135 135 - 145 mmol/L    Potassium 3.6 3.6 - 5.5 mmol/L    Chloride 107 96 - 112 mmol/L    Co2 19 (L) 20 - 33 mmol/L    Anion Gap 9.0 0.0 - 11.9    Glucose 132 (H) 65 - 99 mg/dL    Bun 14 8 - 22 mg/dL    Creatinine 0.86 0.50 - 1.40 mg/dL    Calcium 8.8 8.5 - 10.5 mg/dL    AST(SGOT) 18 12 - 45 U/L    ALT(SGPT) 19 2 - 50 U/L    Alkaline Phosphatase 45 30 - 99 U/L    Total Bilirubin 0.9 0.1 - 1.5 mg/dL    Albumin 3.5 3.2 - 4.9 g/dL    Total Protein 6.4 6.0 - 8.2 g/dL    Globulin 2.9 1.9 - 3.5 g/dL    A-G Ratio 1.2 g/dL   TROPONIN    Collection Time: 07/06/17  3:20 PM   Result Value Ref Range    Troponin I <0.01 0.00 - 0.04 ng/mL   LACTIC ACID    Collection Time: 07/06/17  3:20 PM   Result Value Ref Range    Lactic Acid 1.5 0.5 - 2.0 mmol/L   ESTIMATED GFR    Collection Time: 07/06/17  3:20 PM   Result Value Ref Range    GFR If African American >60 >60 mL/min/1.73 m 2    GFR If Non African American >60 >60 mL/min/1.73 m 2   BLOOD CULTURE    Collection Time: 07/06/17  3:41 PM   Result Value Ref Range    Significant Indicator NEG     Source BLD     Site PERIPHERAL     Blood Culture       No Growth    Note: Blood cultures are incubated for 5 days and  are monitored continuously.Positive blood cultures  are called to the RN and reported as soon as  they are identified.     BLOOD CULTURE    Collection Time: 07/06/17  3:53 PM   Result Value Ref Range    Significant Indicator NEG     Source BLD     Site PERIPHERAL     Blood Culture       No Growth    Note: Blood cultures are incubated for 5 days and  are monitored continuously.Positive blood cultures  are called to the RN and  reported as soon as  they are identified.     URINALYSIS    Collection Time: 07/06/17  4:59 PM   Result Value Ref Range    Color Yellow     Character Clear     Specific Gravity 1.045 <1.035    Ph 6.5 5.0-8.0    Glucose Negative Negative mg/dL    Ketones Negative Negative mg/dL    Protein Negative Negative mg/dL    Bilirubin Negative Negative    Nitrite Positive (A) Negative    Leukocyte Esterase Large (A) Negative    Occult Blood Small (A) Negative    Micro Urine Req Microscopic    URINE MICROSCOPIC (W/UA)    Collection Time: 07/06/17  4:59 PM   Result Value Ref Range    WBC Packed WBC /hpf    RBC 5-10 (A) /hpf    Bacteria Negative None /hpf    Epithelial Cells Negative /hpf    Hyaline Cast 0-2 /lpf   LACTIC ACID    Collection Time: 07/06/17 10:24 PM   Result Value Ref Range    Lactic Acid 1.1 0.5 - 2.0 mmol/L   Lactic Acid Every four hours after STAT order    Collection Time: 07/07/17  2:16 AM   Result Value Ref Range    Lactic Acid 1.2 0.5 - 2.0 mmol/L   Hemoglobin A1C    Collection Time: 07/07/17  2:16 AM   Result Value Ref Range    Glycohemoglobin 5.7 (H) 0.0 - 5.6 %    Est Avg Glucose 117 mg/dL   BASIC METABOLIC PANEL    Collection Time: 07/07/17  2:16 AM   Result Value Ref Range    Sodium 136 135 - 145 mmol/L    Potassium 4.0 3.6 - 5.5 mmol/L    Chloride 107 96 - 112 mmol/L    Co2 23 20 - 33 mmol/L    Glucose 113 (H) 65 - 99 mg/dL    Bun 14 8 - 22 mg/dL    Creatinine 0.98 0.50 - 1.40 mg/dL    Calcium 8.2 (L) 8.5 - 10.5 mg/dL    Anion Gap 6.0 0.0 - 11.9   LIPID PROFILE    Collection Time: 07/07/17  2:16 AM   Result Value Ref Range    Cholesterol,Tot 166 100 - 199 mg/dL    Triglycerides 55 0 - 149 mg/dL    HDL 41 >=40 mg/dL     (H) <100 mg/dL   CBC WITH DIFFERENTIAL    Collection Time: 07/07/17  2:16 AM   Result Value Ref Range    WBC 13.8 (H) 4.8 - 10.8 K/uL    RBC 4.57 (L) 4.70 - 6.10 M/uL    Hemoglobin 13.2 (L) 14.0 - 18.0 g/dL    Hematocrit 39.9 (L) 42.0 - 52.0 %    MCV 87.3 81.4 - 97.8 fL    MCH 28.9  27.0 - 33.0 pg    MCHC 33.1 (L) 33.7 - 35.3 g/dL    RDW 44.1 35.9 - 50.0 fL    Platelet Count 132 (L) 164 - 446 K/uL    MPV 9.9 9.0 - 12.9 fL    Neutrophils-Polys 84.20 (H) 44.00 - 72.00 %    Lymphocytes 6.80 (L) 22.00 - 41.00 %    Monocytes 8.50 0.00 - 13.40 %    Eosinophils 0.00 0.00 - 6.90 %    Basophils 0.10 0.00 - 1.80 %    Immature Granulocytes 0.40 0.00 - 0.90 %    Nucleated RBC 0.00 /100 WBC    Neutrophils (Absolute) 11.61 (H) 1.82 - 7.42 K/uL    Lymphs (Absolute) 0.94 (L) 1.00 - 4.80 K/uL    Monos (Absolute) 1.17 (H) 0.00 - 0.85 K/uL    Eos (Absolute) 0.00 0.00 - 0.51 K/uL    Baso (Absolute) 0.02 0.00 - 0.12 K/uL    Immature Granulocytes (abs) 0.05 0.00 - 0.11 K/uL    NRBC (Absolute) 0.00 K/uL   ESTIMATED GFR    Collection Time: 07/07/17  2:16 AM   Result Value Ref Range    GFR If African American >60 >60 mL/min/1.73 m 2    GFR If Non African American >60 >60 mL/min/1.73 m 2   ECHOCARDIOGRAM COMP W/O CONT    Collection Time: 07/07/17  1:29 PM   Result Value Ref Range    Eject.Frac. MOD BP 76.74     Eject.Frac. MOD 4C 65.67     Eject.Frac. MOD 2C 85.41     Left Ventrical Ejection Fraction 75        Imaging/Procedures Review:    Reviewed    MDM (Assessment and Plan):     Active Hospital Problems    Diagnosis   • History of prostatectomy [Z90.79]   • Sepsis (CMS-HCC) [A41.9]   • Complicated UTI (urinary tract infection) [N39.0]   Hydronephrosis  Leukocytosis  Nephrolithiasis      NPO for bilateral retrograde pyelograms and possible bilateral ureteral stents this evening with Dr. Garcia.  Continue broad spectrum antibiotics pending cultures.    Urology appreciates this consult. Dr. Garcia has directed this plan of care.

## 2017-07-08 LAB
ALBUMIN SERPL BCP-MCNC: 3.2 G/DL (ref 3.2–4.9)
BACTERIA UR CULT: NORMAL
BUN SERPL-MCNC: 15 MG/DL (ref 8–22)
CALCIUM SERPL-MCNC: 8.1 MG/DL (ref 8.5–10.5)
CHLORIDE SERPL-SCNC: 108 MMOL/L (ref 96–112)
CO2 SERPL-SCNC: 25 MMOL/L (ref 20–33)
CREAT SERPL-MCNC: 0.95 MG/DL (ref 0.5–1.4)
ERYTHROCYTE [DISTWIDTH] IN BLOOD BY AUTOMATED COUNT: 45.4 FL (ref 35.9–50)
GFR SERPL CREATININE-BSD FRML MDRD: >60 ML/MIN/1.73 M 2
GLUCOSE SERPL-MCNC: 176 MG/DL (ref 65–99)
HCT VFR BLD AUTO: 37.8 % (ref 42–52)
HGB BLD-MCNC: 12.3 G/DL (ref 14–18)
MCH RBC QN AUTO: 28.7 PG (ref 27–33)
MCHC RBC AUTO-ENTMCNC: 32.5 G/DL (ref 33.7–35.3)
MCV RBC AUTO: 88.3 FL (ref 81.4–97.8)
PHOSPHATE SERPL-MCNC: 2.1 MG/DL (ref 2.5–4.5)
PLATELET # BLD AUTO: 124 K/UL (ref 164–446)
PMV BLD AUTO: 10.3 FL (ref 9–12.9)
POTASSIUM SERPL-SCNC: 4.1 MMOL/L (ref 3.6–5.5)
RBC # BLD AUTO: 4.28 M/UL (ref 4.7–6.1)
SIGNIFICANT IND 70042: NORMAL
SITE SITE: NORMAL
SODIUM SERPL-SCNC: 140 MMOL/L (ref 135–145)
SOURCE SOURCE: NORMAL
WBC # BLD AUTO: 7.9 K/UL (ref 4.8–10.8)

## 2017-07-08 PROCEDURE — 99232 SBSQ HOSP IP/OBS MODERATE 35: CPT | Performed by: INTERNAL MEDICINE

## 2017-07-08 PROCEDURE — 36415 COLL VENOUS BLD VENIPUNCTURE: CPT

## 2017-07-08 PROCEDURE — 85027 COMPLETE CBC AUTOMATED: CPT

## 2017-07-08 PROCEDURE — 700102 HCHG RX REV CODE 250 W/ 637 OVERRIDE(OP): Performed by: INTERNAL MEDICINE

## 2017-07-08 PROCEDURE — A9270 NON-COVERED ITEM OR SERVICE: HCPCS | Performed by: INTERNAL MEDICINE

## 2017-07-08 PROCEDURE — 700105 HCHG RX REV CODE 258: Performed by: INTERNAL MEDICINE

## 2017-07-08 PROCEDURE — 770006 HCHG ROOM/CARE - MED/SURG/GYN SEMI*

## 2017-07-08 PROCEDURE — 700111 HCHG RX REV CODE 636 W/ 250 OVERRIDE (IP): Performed by: INTERNAL MEDICINE

## 2017-07-08 PROCEDURE — 80069 RENAL FUNCTION PANEL: CPT

## 2017-07-08 RX ADMIN — PIPERACILLIN SODIUM AND TAZOBACTAM SODIUM 3.38 G: 3; .375 INJECTION, POWDER, FOR SOLUTION INTRAVENOUS at 12:21

## 2017-07-08 RX ADMIN — PIPERACILLIN SODIUM AND TAZOBACTAM SODIUM 3.38 G: 3; .375 INJECTION, POWDER, FOR SOLUTION INTRAVENOUS at 17:51

## 2017-07-08 RX ADMIN — DIBASIC SODIUM PHOSPHATE, MONOBASIC POTASSIUM PHOSPHATE AND MONOBASIC SODIUM PHOSPHATE 2 TABLET: 852; 155; 130 TABLET ORAL at 09:03

## 2017-07-08 RX ADMIN — STANDARDIZED SENNA CONCENTRATE AND DOCUSATE SODIUM 2 TABLET: 8.6; 5 TABLET, FILM COATED ORAL at 21:28

## 2017-07-08 RX ADMIN — PIPERACILLIN SODIUM AND TAZOBACTAM SODIUM 3.38 G: 3; .375 INJECTION, POWDER, FOR SOLUTION INTRAVENOUS at 06:02

## 2017-07-08 RX ADMIN — DIBASIC SODIUM PHOSPHATE, MONOBASIC POTASSIUM PHOSPHATE AND MONOBASIC SODIUM PHOSPHATE 2 TABLET: 852; 155; 130 TABLET ORAL at 21:28

## 2017-07-08 RX ADMIN — SODIUM CHLORIDE: 9 INJECTION, SOLUTION INTRAVENOUS at 15:41

## 2017-07-08 RX ADMIN — SODIUM CHLORIDE: 9 INJECTION, SOLUTION INTRAVENOUS at 06:02

## 2017-07-08 RX ADMIN — PIPERACILLIN SODIUM AND TAZOBACTAM SODIUM 3.38 G: 3; .375 INJECTION, POWDER, FOR SOLUTION INTRAVENOUS at 00:18

## 2017-07-08 RX ADMIN — ENOXAPARIN SODIUM 40 MG: 100 INJECTION SUBCUTANEOUS at 09:03

## 2017-07-08 RX ADMIN — ASPIRIN 81 MG: 81 TABLET, CHEWABLE ORAL at 09:03

## 2017-07-08 ASSESSMENT — PAIN SCALES - GENERAL
PAINLEVEL_OUTOF10: 0

## 2017-07-08 ASSESSMENT — ENCOUNTER SYMPTOMS
BACK PAIN: 0
BLOOD IN STOOL: 0
PALPITATIONS: 0
HEADACHES: 0
ABDOMINAL PAIN: 0
VOMITING: 0
DEPRESSION: 0
NAUSEA: 0
ABDOMINAL PAIN: 1
HEARTBURN: 0
SORE THROAT: 0
COUGH: 0
CHILLS: 0
FEVER: 0
SHORTNESS OF BREATH: 0
DIARRHEA: 0
MYALGIAS: 0
FOCAL WEAKNESS: 0
HALLUCINATIONS: 0

## 2017-07-08 NOTE — PROGRESS NOTES
"Patient BP 89/62, patient not in any distress and states he feels \"great\".  Dr. Soto notified of BP 89/62, will continue to monitor.  "

## 2017-07-08 NOTE — PROGRESS NOTES
"Patient back from surgery, denies any pain states he \"feels great.\"  VSS.  Assessment complete, needs addressed, call light in reach, will monitor.  "

## 2017-07-08 NOTE — PROGRESS NOTES
Bedside report completed, assumed pt care.  Pt resting in bed, A&Ox4.  No reports of pain.  Discussed POC with pt.  Call light within reach, bed alarm on.

## 2017-07-08 NOTE — PROGRESS NOTES
Pt resting in bed, A&Ox4.  No reports of pain.  Discussed POC with pt.  Call light within reach.

## 2017-07-08 NOTE — PROGRESS NOTES
[facility] Hospitalist Progress Note    Date of Service: 2017    Chief Complaint  67 y.o. male w hx of prostatectomy (now incontinent) and frequent UTIs over last 3mo, admitted 2017 with AMS and weakness found to have sepsis due to UTI.  US  showed L stone w hydro    Interval Problem Update  : US shows left hydro/stone.  Urology consulted and performed cystoscopy w L ureteral stent placement.  Echo normal, MRI normal    : Significant improvement in pain, fever resolved.  Tolerating zosyn. Cx negative so far.    Consultants/Specialty  Dr. Garcia - Urology  Dr. Sabillon - Neurology    Disposition  F/U Urine cx  Urology recs re: stone        Review of Systems   Constitutional: Positive for malaise/fatigue. Negative for fever and chills.   HENT: Negative for sore throat.    Respiratory: Negative for cough and shortness of breath.    Cardiovascular: Negative for chest pain and palpitations.   Gastrointestinal: Positive for abdominal pain (Improved/resolved). Negative for heartburn, nausea, vomiting, diarrhea and blood in stool.   Genitourinary: Negative for dysuria and frequency.   Musculoskeletal: Negative for myalgias and back pain.   Neurological: Negative for focal weakness and headaches. Dizziness: After ceftriaxone.   Psychiatric/Behavioral: Negative for depression and hallucinations.   All other systems reviewed and are negative.     Physical Exam  Laboratory/Imaging   Hemodynamics  Temp (24hrs), Av.6 °C (99.7 °F), Min:36.2 °C (97.1 °F), Max:40.4 °C (104.8 °F)   Temperature: 36.2 °C (97.1 °F)  Pulse  Av  Min: 48  Max: 96 Heart Rate (Monitored): 68  Blood Pressure : 103/65 mmHg, NIBP: 113/50 mmHg      Respiratory      Respiration: 18, Pulse Oximetry: 96 %             Fluids    Intake/Output Summary (Last 24 hours) at 17 1153  Last data filed at 17 0600   Gross per 24 hour   Intake   2600 ml   Output   1375 ml   Net   1225 ml       Nutrition  Orders Placed This Encounter    Procedures   • DIET ORDER     Standing Status: Standing      Number of Occurrences: 1      Standing Expiration Date:      Order Specific Question:  Diet:     Answer:  Regular [1]     Physical Exam   Constitutional: He is oriented to person, place, and time. He appears well-developed and well-nourished. No distress.   HENT:   Head: Normocephalic.   Mouth/Throat: No oropharyngeal exudate.   Eyes: Conjunctivae are normal. Pupils are equal, round, and reactive to light.   Neck: Normal range of motion. No tracheal deviation present.   Cardiovascular: Normal rate and regular rhythm.    No murmur heard.  Pulmonary/Chest: Effort normal. No respiratory distress. He has no wheezes. He exhibits no tenderness.   Abdominal: Soft. He exhibits no distension. There is no tenderness. There is no guarding.   No CVA tenderness   Musculoskeletal: Normal range of motion. He exhibits no edema or tenderness.   Lymphadenopathy:     He has no cervical adenopathy.   Neurological: He is alert and oriented to person, place, and time. No cranial nerve deficit.   Skin: Skin is warm and dry. No rash noted.   Psychiatric: He has a normal mood and affect. His behavior is normal.   Nursing note and vitals reviewed.      Recent Labs      07/06/17   1520  07/07/17   0216  07/08/17   0310   WBC  12.5*  13.8*  7.9   RBC  4.77  4.57*  4.28*   HEMOGLOBIN  13.8*  13.2*  12.3*   HEMATOCRIT  40.8*  39.9*  37.8*   MCV  85.5  87.3  88.3   MCH  28.9  28.9  28.7   MCHC  33.8  33.1*  32.5*   RDW  43.1  44.1  45.4   PLATELETCT  152*  132*  124*   MPV  9.7  9.9  10.3     Recent Labs      07/06/17   1520  07/07/17   0216  07/08/17   0310   SODIUM  135  136  140   POTASSIUM  3.6  4.0  4.1   CHLORIDE  107  107  108   CO2  19*  23  25   GLUCOSE  132*  113*  176*   BUN  14  14  15   CREATININE  0.86  0.98  0.95   CALCIUM  8.8  8.2*  8.1*     Recent Labs      07/06/17   1520   APTT  28.6   INR  1.12         Recent Labs      07/07/17   0216   TRIGLYCERIDE  55   HDL  41    LDL  114*          Assessment/Plan     * Complicated UTI (urinary tract infection)  Assessment & Plan  Due to male patient, hx of prostatectomy, presence of stone  -Zosyn, needs 7d treatment (total)  -Likely change to PO in am if remains afebrile    Sepsis (CMS-HCC)  Assessment & Plan  Due to urinary source, left renal stone causing refractory UTI.  Failed outpatient treatment  -Sepsis protocol  -Follow cultures  -Lactate ok, HD stable  -Continue Zosyn (got rash w ceftriaxone)    History of prostatectomy  Assessment & Plan  Seen Dr. Christopher in the past  -Urology consulted  -Incontinence    Hydronephrosis of left kidney  Assessment & Plan  Due to L renal stone.  Has hx of stones but never required intervention.  Has hx of prostatectomy and has seen Dr. Christopher in the past for this.  Has recurrent/refractory UTI and now sepsis  -S/P L ureteral stent (7/7)  -Dr. Garcia following  -Zosyn  -Renal function is ok  -Repeat in AM      Renal stone, left  Assessment & Plan  Causing hydronephrosis, recurrent/refractory UTI  -Now s/p cystoscopy w L ureteral stent placement  -Dr. Garcia' services appreciated  -Pain is controlled (IV morphine/oxy PRN)     Acute metabolic encephalopathy  Assessment & Plan  Due to Sepsis, he had no focal deficits.  Stroke workup was initiated as was neurology consultation  -ASA 81  -MRI negative  -All deficits resolved  -Neuro signed off        Radiology images reviewed, Medications reviewed and Labs reviewed  Lucas catheter: No Lucas      DVT Prophylaxis: Enoxaparin (Lovenox)      Antibiotics: Treating active infection/contamination beyond 24 hours perioperative coverage

## 2017-07-08 NOTE — CARE PLAN
Problem: Fluid Volume:  Goal: Will maintain balanced intake and output  Intervention: Monitor, educate, and encourage compliance with therapeutic intake of liquids  Monitoring I's and O's      Problem: Knowledge Deficit  Goal: Knowledge of disease process/condition, treatment plan, diagnostic tests, and medications will improve  Intervention: Explain information regarding disease process/condition, treatment plan, diagnostic tests, and medications and document in education  Patient updated on plan of care, verbalized understanding

## 2017-07-08 NOTE — PROGRESS NOTES
Surgical Progress Note    Author: Mikal Engel Date & Time created: 2017   9:02 AM     Interval Events:    Patient seen and examined.  Feeling much better. Fevers resolved. WBC normalized.      Review of Systems   Constitutional: Negative for fever and chills.   Gastrointestinal: Negative for nausea, vomiting and abdominal pain.   Genitourinary: Positive for urgency, frequency and hematuria. Negative for dysuria.     Hemodynamics:  Temp (24hrs), Av.7 °C (99.8 °F), Min:36.7 °C (98 °F), Max:40.4 °C (104.8 °F)  Temperature: 36.9 °C (98.4 °F)  Pulse  Av.6  Min: 48  Max: 96Heart Rate (Monitored): 68  Blood Pressure : (!) 97/66 mmHg, NIBP: 113/50 mmHg     Respiratory:    Respiration: 15, Pulse Oximetry: 96 %           Neuro:  GCS       Fluids:    Intake/Output Summary (Last 24 hours) at 17 0902  Last data filed at 17 0600   Gross per 24 hour   Intake   2600 ml   Output   1375 ml   Net   1225 ml     Weight: 77.8 kg (171 lb 8.3 oz)  Current Diet Order   Procedures   • DIET ORDER     Physical Exam   Constitutional: He is oriented to person, place, and time. He appears well-developed and well-nourished. No distress.   Pulmonary/Chest: Effort normal.   Abdominal: Soft. He exhibits no distension. There is no tenderness.   Genitourinary:   - CVAT   Neurological: He is alert and oriented to person, place, and time.   Skin: Skin is warm and dry.   Psychiatric: He has a normal mood and affect.   Nursing note and vitals reviewed.    Labs:  Recent Results (from the past 24 hour(s))   ECHOCARDIOGRAM COMP W/O CONT    Collection Time: 17  1:29 PM   Result Value Ref Range    Eject.Frac. MOD BP 76.74     Eject.Frac. MOD 4C 65.67     Eject.Frac. MOD 2C 85.41     Left Ventrical Ejection Fraction 75    RENAL FUNCTION PANEL    Collection Time: 17  3:10 AM   Result Value Ref Range    Sodium 140 135 - 145 mmol/L    Potassium 4.1 3.6 - 5.5 mmol/L    Chloride 108 96 - 112 mmol/L    Co2 25 20 - 33 mmol/L     Glucose 176 (H) 65 - 99 mg/dL    Creatinine 0.95 0.50 - 1.40 mg/dL    Bun 15 8 - 22 mg/dL    Calcium 8.1 (L) 8.5 - 10.5 mg/dL    Phosphorus 2.1 (L) 2.5 - 4.5 mg/dL    Albumin 3.2 3.2 - 4.9 g/dL   CBC WITHOUT DIFFERENTIAL    Collection Time: 07/08/17  3:10 AM   Result Value Ref Range    WBC 7.9 4.8 - 10.8 K/uL    RBC 4.28 (L) 4.70 - 6.10 M/uL    Hemoglobin 12.3 (L) 14.0 - 18.0 g/dL    Hematocrit 37.8 (L) 42.0 - 52.0 %    MCV 88.3 81.4 - 97.8 fL    MCH 28.7 27.0 - 33.0 pg    MCHC 32.5 (L) 33.7 - 35.3 g/dL    RDW 45.4 35.9 - 50.0 fL    Platelet Count 124 (L) 164 - 446 K/uL    MPV 10.3 9.0 - 12.9 fL   ESTIMATED GFR    Collection Time: 07/08/17  3:10 AM   Result Value Ref Range    GFR If African American >60 >60 mL/min/1.73 m 2    GFR If Non African American >60 >60 mL/min/1.73 m 2       Plan:  1. Left renal pelvis stone with hydronephrosis s/p ureteral stent placement 7/7/17  2. UTI  3. Leukocytosis, resolved    Antibiotics per hospital team pending cultures  Stent to remain with plan for outpatient stone treatment      Quality Measures:  Labs reviewed and Medications reviewed  Lucas catheter: No Lucas                    Discussed patient condition with Patient and urology-Dr. Garcia

## 2017-07-08 NOTE — OP REPORT
OPERATIVE NOTE   Carl Finney               PRE-OP DIAGNOSIS:left renal pelvis stone POST-OP DIAGNOSIS: * No Diagnosis Codes entered *            PROCEDURE: Procedure(s) and Anesthesia Type:     * CYSTOSCOPY STENT PLACEMENT - General     * RETROGRADES - General            SURGEON: Surgeon(s) and Role:     * Hadley Garcia M.D. - Primary            ANESTHESIA: get            ESTIMATED BLOOD LOSS: none            DRAINS: 28cm 6F left ureteral stent            TOTAL IV FLUIDS:              SPECIMENS: * No specimens in log *            IMPLANTS: * No implants in log *            COMPLICATIONS: none            DISPOSITION: @TRANSFER SITE@            CONDITION: stable            TECHNIQUE: Brought to OR. Gen anesthesia. Lithotomy position. Prepped and draped. Cystoscopy with norml urethra, absent prostate, normal bladder except urothelium hyperemic. No foreign bodies. Retrograde pyelogram on right with no stones or obstruction. On left can see stone on fluroscopy. Retrograde with normal ureter. Stone in renal pelvis. Wire passed to pelvis. Over this 6F 28cm stent placed. Coils in pelvis and bladder. Urine effluxes from distal coil. Completes procedure. Sent to recovery stable.

## 2017-07-09 VITALS
OXYGEN SATURATION: 91 % | HEART RATE: 74 BPM | TEMPERATURE: 99.3 F | HEIGHT: 74 IN | WEIGHT: 177.03 LBS | BODY MASS INDEX: 22.72 KG/M2 | RESPIRATION RATE: 19 BRPM | SYSTOLIC BLOOD PRESSURE: 133 MMHG | DIASTOLIC BLOOD PRESSURE: 89 MMHG

## 2017-07-09 LAB
ALBUMIN SERPL BCP-MCNC: 2.8 G/DL (ref 3.2–4.9)
BUN SERPL-MCNC: 17 MG/DL (ref 8–22)
CALCIUM SERPL-MCNC: 7.9 MG/DL (ref 8.5–10.5)
CHLORIDE SERPL-SCNC: 113 MMOL/L (ref 96–112)
CO2 SERPL-SCNC: 23 MMOL/L (ref 20–33)
CREAT SERPL-MCNC: 0.85 MG/DL (ref 0.5–1.4)
ERYTHROCYTE [DISTWIDTH] IN BLOOD BY AUTOMATED COUNT: 44.7 FL (ref 35.9–50)
GFR SERPL CREATININE-BSD FRML MDRD: >60 ML/MIN/1.73 M 2
GLUCOSE SERPL-MCNC: 112 MG/DL (ref 65–99)
HCT VFR BLD AUTO: 35.2 % (ref 42–52)
HGB BLD-MCNC: 11.4 G/DL (ref 14–18)
MCH RBC QN AUTO: 28.4 PG (ref 27–33)
MCHC RBC AUTO-ENTMCNC: 32.4 G/DL (ref 33.7–35.3)
MCV RBC AUTO: 87.8 FL (ref 81.4–97.8)
PHOSPHATE SERPL-MCNC: 2 MG/DL (ref 2.5–4.5)
PLATELET # BLD AUTO: 143 K/UL (ref 164–446)
PMV BLD AUTO: 10.5 FL (ref 9–12.9)
POTASSIUM SERPL-SCNC: 4 MMOL/L (ref 3.6–5.5)
RBC # BLD AUTO: 4.01 M/UL (ref 4.7–6.1)
SODIUM SERPL-SCNC: 140 MMOL/L (ref 135–145)
WBC # BLD AUTO: 7.7 K/UL (ref 4.8–10.8)

## 2017-07-09 PROCEDURE — 90471 IMMUNIZATION ADMIN: CPT

## 2017-07-09 PROCEDURE — 700102 HCHG RX REV CODE 250 W/ 637 OVERRIDE(OP): Performed by: INTERNAL MEDICINE

## 2017-07-09 PROCEDURE — 90670 PCV13 VACCINE IM: CPT | Performed by: INTERNAL MEDICINE

## 2017-07-09 PROCEDURE — 700111 HCHG RX REV CODE 636 W/ 250 OVERRIDE (IP): Performed by: INTERNAL MEDICINE

## 2017-07-09 PROCEDURE — 3E0234Z INTRODUCTION OF SERUM, TOXOID AND VACCINE INTO MUSCLE, PERCUTANEOUS APPROACH: ICD-10-PCS | Performed by: INTERNAL MEDICINE

## 2017-07-09 PROCEDURE — 36415 COLL VENOUS BLD VENIPUNCTURE: CPT

## 2017-07-09 PROCEDURE — A9270 NON-COVERED ITEM OR SERVICE: HCPCS | Performed by: INTERNAL MEDICINE

## 2017-07-09 PROCEDURE — 80069 RENAL FUNCTION PANEL: CPT

## 2017-07-09 PROCEDURE — 85027 COMPLETE CBC AUTOMATED: CPT

## 2017-07-09 PROCEDURE — 700105 HCHG RX REV CODE 258: Performed by: INTERNAL MEDICINE

## 2017-07-09 PROCEDURE — 99239 HOSP IP/OBS DSCHRG MGMT >30: CPT | Performed by: INTERNAL MEDICINE

## 2017-07-09 RX ORDER — PHENAZOPYRIDINE HYDROCHLORIDE 200 MG/1
200 TABLET, FILM COATED ORAL 3 TIMES DAILY PRN
Qty: 6 TAB | Refills: 0 | Status: SHIPPED | OUTPATIENT
Start: 2017-07-09 | End: 2017-08-23

## 2017-07-09 RX ORDER — AMOXICILLIN AND CLAVULANATE POTASSIUM 875; 125 MG/1; MG/1
1 TABLET, FILM COATED ORAL 2 TIMES DAILY
Qty: 20 TAB | Refills: 0 | Status: SHIPPED | OUTPATIENT
Start: 2017-07-09 | End: 2017-07-19

## 2017-07-09 RX ADMIN — PIPERACILLIN SODIUM AND TAZOBACTAM SODIUM 3.38 G: 3; .375 INJECTION, POWDER, FOR SOLUTION INTRAVENOUS at 05:55

## 2017-07-09 RX ADMIN — ENOXAPARIN SODIUM 40 MG: 100 INJECTION SUBCUTANEOUS at 08:24

## 2017-07-09 RX ADMIN — PNEUMOCOCCAL 13-VALENT CONJUGATE VACCINE 0.5 ML: 2.2; 2.2; 2.2; 2.2; 2.2; 4.4; 2.2; 2.2; 2.2; 2.2; 2.2; 2.2; 2.2 INJECTION, SUSPENSION INTRAMUSCULAR at 11:24

## 2017-07-09 RX ADMIN — ASPIRIN 81 MG: 81 TABLET, CHEWABLE ORAL at 08:23

## 2017-07-09 RX ADMIN — PIPERACILLIN SODIUM AND TAZOBACTAM SODIUM 3.38 G: 3; .375 INJECTION, POWDER, FOR SOLUTION INTRAVENOUS at 00:45

## 2017-07-09 RX ADMIN — ACETAMINOPHEN 650 MG: 325 TABLET, FILM COATED ORAL at 08:31

## 2017-07-09 RX ADMIN — SODIUM CHLORIDE: 9 INJECTION, SOLUTION INTRAVENOUS at 00:51

## 2017-07-09 RX ADMIN — DIBASIC SODIUM PHOSPHATE, MONOBASIC POTASSIUM PHOSPHATE AND MONOBASIC SODIUM PHOSPHATE 2 TABLET: 852; 155; 130 TABLET ORAL at 08:23

## 2017-07-09 ASSESSMENT — PAIN SCALES - GENERAL: PAINLEVEL_OUTOF10: 1

## 2017-07-09 ASSESSMENT — ENCOUNTER SYMPTOMS
FEVER: 0
CHILLS: 0
CONSTIPATION: 1
FLANK PAIN: 0
ABDOMINAL PAIN: 0

## 2017-07-09 ASSESSMENT — LIFESTYLE VARIABLES: EVER_SMOKED: NEVER

## 2017-07-09 NOTE — CARE PLAN
Problem: Safety  Goal: Will remain free from falls  Intervention: Assess risk factors for falls  Patient upself with steady gait      Problem: Knowledge Deficit  Goal: Knowledge of disease process/condition, treatment plan, diagnostic tests, and medications will improve  Intervention: Explain information regarding disease process/condition, treatment plan, diagnostic tests, and medications and document in education  Patient updated on plan of care, verbalized understanding

## 2017-07-09 NOTE — PROGRESS NOTES
Discharge education and Prescriptions given. Answered all questions. IV and monitor DC'd. Patient left with family. Refused hospital escort.

## 2017-07-09 NOTE — PROGRESS NOTES
Patient resting in bed, no distress noted, call light in reach, will monitor.  Monitor summary: non-tele.

## 2017-07-09 NOTE — PROGRESS NOTES
Assumed care report received. Pt resting in bed  1/10 pain in head Gave medication See MAR.Plan of care discussed no other concerns at this time. Call light within reach. Fall precautions in place.

## 2017-07-09 NOTE — PROGRESS NOTES
Patient resting in bed, denies discomfort, no change from previous assessment.  Call light in reach, will monitor.

## 2017-07-09 NOTE — DISCHARGE SUMMARY
CHIEF COMPLAINT ON ADMISSION  Chief Complaint   Patient presents with   • Possible Stroke       CODE STATUS  Full    HPI & HOSPITAL COURSE  This is a 67 y.o. male w hx of prostatectomy (now incontinent) and frequent UTIs over last 3mo, admitted 7/6/2017 with AMS and weakness found to have sepsis due to UTI.  Renal US 7/7 showed L renal pelvis stone w hydronephrosis.  Urology was consulted and took the patient for urgent ureteral stent placement which was performed without complication.  He was started on ceftriaxone initially, but this caused him to feel flushed and dizzy therefore it was discontinued and he was placed on zosyn (no hx of PCN allergy) which was well tolerated.    His sepsis resolved after stent placement and his abdominal discomfort improved.  He was able to have a BM on the morning of discharge and it was without blood.     He will follow up with urology regarding any further management of his nephrolithiasis.    Therefore, he is discharged in good and stable condition with close outpatient follow-up.    SPECIFIC OUTPATIENT FOLLOW-UP  F/U with urology at scheduled apt  F/U with PCP in 3-5 days    DISCHARGE PROBLEM LIST  Principal Problem:    Complicated UTI (urinary tract infection)  Active Problems:    Sepsis (CMS-HCC) POA: Unknown    History of prostatectomy POA: Unknown    Hydronephrosis of left kidney POA: Unknown    Renal stone, left POA: Unknown    Acute metabolic encephalopathy POA: Unknown  Resolved Problems:    * No resolved hospital problems. *      FOLLOW UP  Future Appointments  Date Time Provider Department Center   7/11/2017 10:00 AM CARE MANAGER [facility]   7/12/2017 1:00 PM JOS Mendoza M.D.  [id number] [facility] [address]  [phone]    Schedule an appointment as soon as possible for a visit  Our office will contact to arrange follow up in about 2 weeks      MEDICATIONS ON DISCHARGE   Carl Finney   Home Medication  Instructions [facility]:[id number]    Printed on:07/09/17 2168   Medication Information                      amoxicillin-clavulanate (AUGMENTIN) 875-125 MG Tab  Take 1 Tab by mouth 2 times a day for 10 days.             aspirin (ASA) 325 MG Tab  Take 650 mg by mouth every 6 hours as needed (headache).             aspirin (ASA) 81 MG Chew Tab chewable tablet  Take 81 mg by mouth every day.             phenazopyridine (PYRIDIUM) 200 MG Tab  Take 1 Tab by mouth 3 times a day as needed (dysuria).             phosphorus (K-PHOS-NEUTRAL, PHOSPHA 250 NEUTRAL) 155-852-130 MG tablet  Take 2 Tabs by mouth 2 Times a Day for 4 days.                 DIET  As tolerated    ACTIVITY  As tolerated.  Weight bearing as tolerated      CONSULTATIONS  Dr. Garcia - urology    PROCEDURES  7/7/17: Cystoscopy with Left ureteral stent placement    LABORATORY  Lab Results   Component Value Date/Time    SODIUM 140 07/09/2017 02:20 AM    POTASSIUM 4.0 07/09/2017 02:20 AM    CHLORIDE 113* 07/09/2017 02:20 AM    CO2 23 07/09/2017 02:20 AM    GLUCOSE 112* 07/09/2017 02:20 AM    BUN 17 07/09/2017 02:20 AM    CREATININE 0.85 07/09/2017 02:20 AM        Lab Results   Component Value Date/Time    WBC 7.7 07/09/2017 02:20 AM    HEMOGLOBIN 11.4* 07/09/2017 02:20 AM    HEMATOCRIT 35.2* 07/09/2017 02:20 AM    PLATELET COUNT 143* 07/09/2017 02:20 AM        Total time of the discharge process exceeds 36 minutes

## 2017-07-09 NOTE — DISCHARGE INSTRUCTIONS
Discharge Instructions    Discharged to home by car with relative. Discharged via wheelchair, hospital escort: Yes.  Special equipment needed: Not Applicable    Be sure to schedule a follow-up appointment with your primary care doctor or any specialists as instructed.     Discharge Plan:   Diet Plan: Discussed  Activity Level: Discussed  Confirmed Follow up Appointment: Appointment Scheduled  Confirmed Symptoms Management: Discussed  Medication Reconciliation Updated: Yes  Influenza Vaccine Indication: Not indicated: Previously immunized this influenza season and > 8 years of age    I understand that a diet low in cholesterol, fat, and sodium is recommended for good health. Unless I have been given specific instructions below for another diet, I accept this instruction as my diet prescription.   Other diet:     Special Instructions: Sepsis, Adult  Sepsis is a serious infection of your blood or tissues that affects your whole body. The infection that causes sepsis may be bacterial, viral, fungal, or parasitic. Sepsis may be life threatening. Sepsis can cause your blood pressure to drop. This may result in shock. Shock causes your central nervous system and your organs to stop working correctly.   RISK FACTORS  Sepsis can happen in anyone, but it is more likely to happen in people who have weakened immune systems.  SIGNS AND SYMPTOMS   Symptoms of sepsis can include:  · Fever or low body temperature (hypothermia).  · Rapid breathing (hyperventilation).  · Chills.  · Rapid heartbeat (tachycardia).  · Confusion or light-headedness.  · Trouble breathing.  · Urinating much less than usual.  · Cool, clammy skin or red, flushed skin.  · Other problems with the heart, kidneys, or brain.  DIAGNOSIS   Your health care provider will likely do tests to look for an infection, to see if the infection has spread to your blood, and to see how serious your condition is. Tests can include:  · Blood tests, including cultures of your  blood.  · Cultures of other fluids from your body, such as:  ¨ Urine.  ¨ Pus from wounds.  ¨ Mucus coughed up from your lungs.  · Urine tests other than cultures.  · X-ray exams or other imaging tests.  TREATMENT   Treatment will begin with elimination of the source of infection. If your sepsis is likely caused by a bacterial or fungal infection, you will be given antibiotic or antifungal medicines.  You may also receive:  · Oxygen.  · Fluids through an IV tube.  · Medicines to increase your blood pressure.  · A machine to clean your blood (dialysis) if your kidneys fail.  · A machine to help you breathe if your lungs fail.  SEEK IMMEDIATE MEDICAL CARE IF:  You get an infection or develop any of the signs and symptoms of sepsis after surgery or a hospitalization.     This information is not intended to replace advice given to you by your health care provider. Make sure you discuss any questions you have with your health care provider.     Document Released: 09/15/2004 Document Revised: 05/03/2016 Document Reviewed: 08/25/2014  [employer] Interactive Patient Education ©2016 [employer]      · Is patient discharged on Warfarin / Coumadin?   No     · Is patient Post Blood Transfusion?  No    Depression / Suicide Risk    As you are discharged from this [facility] facility, it is important to learn how to keep safe from harming yourself.    Recognize the warning signs:  · Abrupt changes in personality, positive or negative- including increase in energy   · Giving away possessions  · Change in eating patterns- significant weight changes-  positive or negative  · Change in sleeping patterns- unable to sleep or sleeping all the time   · Unwillingness or inability to communicate  · Depression  · Unusual sadness, discouragement and loneliness  · Talk of wanting to die  · Neglect of personal appearance   · Rebelliousness- reckless behavior  · Withdrawal from people/activities they love  · Confusion- inability to  concentrate     If you or a loved one observes any of these behaviors or has concerns about self-harm, here's what you can do:  · Talk about it- your feelings and reasons for harming yourself  · Remove any means that you might use to hurt yourself (examples: pills, rope, extension cords, firearm)  · Get professional help from the community (Mental Health, Substance Abuse, psychological counseling)  · Do not be alone:Call your Safe Contact- someone whom you trust who will be there for you.  · Call your local CRISIS HOTLINE 388-4156 or 140-943-8387  · Call your local Children's Mobile Crisis Response Team [place] (892) 584-8350 or www.Playroll  · Call the toll free National Suicide Prevention Hotlines   · National Suicide Prevention Lifeline 237-308-ADYL (7319)  · National Hope Line Network 800-SUICIDE (542-2655)

## 2017-07-09 NOTE — PROGRESS NOTES
Patient resting in bed, assessment complete, awake and alert, denies pain.  Scheduled medications given, needs addressed, call light in reach, will monitor.

## 2017-07-09 NOTE — PROGRESS NOTES
Surgical Progress Note    Author: Mikal Engel Date & Time created: 2017   10:09 AM     Interval Events:    Patient seen and examined.  Abd pain related to constipation but relieved following BM today. Denies flank pain. No fevers. On Zosyn. Urine culture contaminated and BC negative.      Review of Systems   Constitutional: Negative for fever and chills.   Gastrointestinal: Positive for constipation. Negative for abdominal pain.   Genitourinary: Negative for hematuria and flank pain.     Hemodynamics:  Temp (24hrs), Av.8 °C (98.2 °F), Min:36.3 °C (97.3 °F), Max:37.4 °C (99.3 °F)  Temperature: 37.4 °C (99.3 °F)  Pulse  Av.6  Min: 48  Max: 96   Blood Pressure : 133/89 mmHg     Respiratory:    Respiration: 19, Pulse Oximetry: 91 %           Neuro:  GCS       Fluids:    Intake/Output Summary (Last 24 hours) at 17 1009  Last data filed at 17 0600   Gross per 24 hour   Intake   3055 ml   Output   2000 ml   Net   1055 ml     Weight: 80.3 kg (177 lb 0.5 oz)  Current Diet Order   Procedures   • DIET ORDER     Physical Exam   Constitutional: He is oriented to person, place, and time. He appears well-developed and well-nourished. No distress.   Pulmonary/Chest: Effort normal.   Abdominal: Soft. Bowel sounds are normal. He exhibits no distension. There is no tenderness.   Neurological: He is alert and oriented to person, place, and time.   Skin: Skin is warm and dry.   Psychiatric: He has a normal mood and affect.   Nursing note and vitals reviewed.    Labs:  Recent Results (from the past 24 hour(s))   RENAL FUNCTION PANEL    Collection Time: 17  2:20 AM   Result Value Ref Range    Sodium 140 135 - 145 mmol/L    Potassium 4.0 3.6 - 5.5 mmol/L    Chloride 113 (H) 96 - 112 mmol/L    Co2 23 20 - 33 mmol/L    Glucose 112 (H) 65 - 99 mg/dL    Creatinine 0.85 0.50 - 1.40 mg/dL    Bun 17 8 - 22 mg/dL    Calcium 7.9 (L) 8.5 - 10.5 mg/dL    Phosphorus 2.0 (L) 2.5 - 4.5 mg/dL    Albumin 2.8 (L) 3.2  - 4.9 g/dL   CBC WITHOUT DIFFERENTIAL    Collection Time: 07/09/17  2:20 AM   Result Value Ref Range    WBC 7.7 4.8 - 10.8 K/uL    RBC 4.01 (L) 4.70 - 6.10 M/uL    Hemoglobin 11.4 (L) 14.0 - 18.0 g/dL    Hematocrit 35.2 (L) 42.0 - 52.0 %    MCV 87.8 81.4 - 97.8 fL    MCH 28.4 27.0 - 33.0 pg    MCHC 32.4 (L) 33.7 - 35.3 g/dL    RDW 44.7 35.9 - 50.0 fL    Platelet Count 143 (L) 164 - 446 K/uL    MPV 10.5 9.0 - 12.9 fL   ESTIMATED GFR    Collection Time: 07/09/17  2:20 AM   Result Value Ref Range    GFR If African American >60 >60 mL/min/1.73 m 2    GFR If Non African American >60 >60 mL/min/1.73 m 2     Medical Decision Making, by Problem:  Active Hospital Problems    Diagnosis   • History of prostatectomy [Z90.79]   • Hydronephrosis of left kidney [N13.30]   • Renal stone, left [N20.0]   • Acute metabolic encephalopathy [G93.41]   • Sepsis (CMS-HCC) [A41.9]   • Complicated UTI (urinary tract infection) [N39.0]     Plan:  1. Left renal pelvis stone with hydronephrosis s/p ureteral stent placement 7/7/17  2. UTI    Recommend transition to oral antibiotic upon discharge  Outpatient management of stent/stone with Dr. Garcia  Urology signing off    Quality Measures:  Labs reviewed and Medications reviewed  Lucas catheter: No Lucas                    Discussed patient condition with Patient and urology-Dr. Garcia

## 2017-07-11 ENCOUNTER — PATIENT OUTREACH (OUTPATIENT)
Dept: HEALTH INFORMATION MANAGEMENT | Facility: OTHER | Age: 67
End: 2017-07-11

## 2017-07-11 LAB
BACTERIA BLD CULT: NORMAL
BACTERIA BLD CULT: NORMAL
SIGNIFICANT IND 70042: NORMAL
SIGNIFICANT IND 70042: NORMAL
SITE SITE: NORMAL
SITE SITE: NORMAL
SOURCE SOURCE: NORMAL
SOURCE SOURCE: NORMAL

## 2017-07-12 ENCOUNTER — OFFICE VISIT (OUTPATIENT)
Dept: MEDICAL GROUP | Facility: CLINIC | Age: 67
End: 2017-07-12
Payer: MEDICARE

## 2017-07-12 VITALS
DIASTOLIC BLOOD PRESSURE: 80 MMHG | SYSTOLIC BLOOD PRESSURE: 128 MMHG | HEIGHT: 74 IN | WEIGHT: 169 LBS | HEART RATE: 78 BPM | BODY MASS INDEX: 21.69 KG/M2 | OXYGEN SATURATION: 97 % | RESPIRATION RATE: 16 BRPM | TEMPERATURE: 97.6 F

## 2017-07-12 DIAGNOSIS — A41.9 SEPSIS, DUE TO UNSPECIFIED ORGANISM: ICD-10-CM

## 2017-07-12 DIAGNOSIS — N39.0 COMPLICATED UTI (URINARY TRACT INFECTION): ICD-10-CM

## 2017-07-12 DIAGNOSIS — N20.0 RENAL STONES: ICD-10-CM

## 2017-07-12 DIAGNOSIS — D64.9 NORMOCYTIC ANEMIA: ICD-10-CM

## 2017-07-12 DIAGNOSIS — N13.30 HYDRONEPHROSIS OF LEFT KIDNEY: ICD-10-CM

## 2017-07-12 DIAGNOSIS — R73.03 PREDIABETES: ICD-10-CM

## 2017-07-12 DIAGNOSIS — Z09 HOSPITAL DISCHARGE FOLLOW-UP: ICD-10-CM

## 2017-07-12 PROCEDURE — 99496 TRANSJ CARE MGMT HIGH F2F 7D: CPT | Performed by: FAMILY MEDICINE

## 2017-07-12 ASSESSMENT — PATIENT HEALTH QUESTIONNAIRE - PHQ9: CLINICAL INTERPRETATION OF PHQ2 SCORE: 0

## 2017-07-12 NOTE — PROGRESS NOTES
Subjective:     Carl Finney is a 67 y.o. male who presents for Hospital Follow-up.  Chart and discharge summary reviewed.   Date of discharge 7/9/17.  48- hour post discharge RN call completed on 7/11/17 and documented in the medical record by Ana Maria Espinal RN:  POST DISCHARGE CALL:  Discharge Date:7/9/2017   Date of Outreach Call: 7/11/2017 10:00 AM  Now that you're home, how are you doing? Good  Comment:Pt states he is doing well s/p hospital  discharge.  Denies any untoward s/s, voices no complaints.  Do you have questions about your medications? No    Did you fill your medications? Yes  Comment:Pt states he is taking all meds as prescribed.    Do you have a follow-up appointment scheduled?Yes  Comment:Pt will f/u at discharge clinic on 7/12 at 1:00  PM.  Pt will drive self to appt.  Instructed pt on location  of [facility].  Pt has been in contact with Dr. Garica'  office and will schedule f/u appt for 2 weeks.    Discharging Department: Telemetry 7    Number of Attempts: 1  Current or previous attempts completed within two business days of discharge? Yes  Provided education regarding treatment plan, medication, self-management, ADLs? No  Comment:Pt states he understands his d/c instructions  and medications and has no questions.  Has patient completed Advance Directive? If yes, advise them to bring to appointment. No  Care Manager phone number provided? Yes  Comment:Angeline at [phone]  Is there anything else I can help you with? No       HPI: Recently hospitalized for sepsis secondary to complicated UTI. Renal ultrasound showed left renal pelvis stone with hydronephrosis. Patient underwent urgent ureteral stent placement. He was started on ceftriaxone but had a severe reaction. He was switched to Zosyn and discharged on Augmentin.    Since returning home, patient reports feeling much better. He says that he had been having left-sided abdominal pain for 7-8 years and now it is gone.     The patient  "has questions regarding hospitalization or discharge: QUESTIONS were answered  The patient denies weakness; denies difficulty taking care of self at home.  Patient reports taking medications as instructed.      Allergies:   Ceftriaxone    Social History:  Social History   Substance Use Topics   • Smoking status: Never Smoker    • Smokeless tobacco: Never Used   • Alcohol Use: No        ROS:    Constitutional:  Denies fever, chills, night sweats, fatigue or malaise  HENT: Denies head congestion, ear pain or drainage, decreased hearing, sore throat  Eyes: Denies visual changes, eye drainage or redness, eye pain  Neck: Denies pain, swollen glands, decreased range of motion  Lungs: Denies shortness of breath, wheezing, cough  Cardiovascular: Denies chest pain, orthopnea, lower extremity edema, palpitations  Abdominal: Denies abdominal pain, change in bowel habits, nausea or vomiting  Musculoskeletal: Denies back pain, joint pains, decreased range of motion  Endocrine: Denies unexplained weight changes, heat or cold intolerance, hair loss, polyuria or polydipsia  Neurological: Denies dizziness, headache, confusion, focal weakness or numbness, memory loss  Psychiatric: Denies depression, anxiety, insomnia       Objective:     Blood pressure 128/80, pulse 78, temperature 36.4 °C (97.6 °F), resp. rate 16, height 1.88 m (6' 2.02\"), weight 76.658 kg (169 lb), SpO2 97 %.     Physical Exam:    GEN:  Alert, oriented, in no distress  HEENT:  PERRLA, EOMI  NECK;  Supple without adenopathy  LUNGS:  Clear to auscultation without rales, rhonci, or wheezes.  CV:  Heart RRR without murmurs or S3 or S4  ABD:  Soft, nontender, nondistended, normal bowel sounds.  No hepatosplenomegaly. No CVA tenderness to palpation.  EXT:  Without cyanosis, clubbing, or edema.  NEURO:  Cranial nerves II through XII intact.  Motor function and sensation intact.  SKIN: No rashes or suspicious lesions.  PSYCH:  Behavior is appropriate.      Assessment and " Plan:     1. Hospital follow-up:   Hospitalization and results reviewed with patient. High risk conditions requiring teaching or care coordination were identified and addressed.The patient demonstrate understanding of admission and underlying conditions. The patient understands discharge instructions and when to seek medical attention. Medications reviewed including instructions regarding high risk medications, dosing and side effects.    The patient is able to safely adhere to ADL/IADL, treatment and medication regimen, self-manage of high-risk conditions? Yes   The patient requires physical therapy/home health/DME referral? No   The patient requires referral to care coordination/behavioral health/social work?  No   Patient requires referral for pharmacy consult? No   Advance directive/POLST on file?  No   Counseled on advance directive? Deferred    2. Sepsis, due to unspecified organism (CMS-HCC)  -Now resolved    3. Hydronephrosis of left kidney  -Status post ureteral stent placement    4. Renal stone, left  -Status post stent placement    5. Complicated UTI (urinary tract infection)  -He is completing a course of Augmentin    6. Normocytic anemia  -Needs PCP follow-up    7. Prediabetes  -Counseling provided. PCP to follow-up.        Medication Reconciliation  Medication list at end of encounter:   Current Outpatient Prescriptions   Medication Sig Dispense Refill   • phosphorus (K-PHOS-NEUTRAL, PHOSPHA 250 NEUTRAL) 155-852-130 MG tablet Take 2 Tabs by mouth 2 Times a Day for 4 days. 16 Tab 0   • phenazopyridine (PYRIDIUM) 200 MG Tab Take 1 Tab by mouth 3 times a day as needed (dysuria). 6 Tab 0   • amoxicillin-clavulanate (AUGMENTIN) 875-125 MG Tab Take 1 Tab by mouth 2 times a day for 10 days. 20 Tab 0   • aspirin (ASA) 325 MG Tab Take 650 mg by mouth every 6 hours as needed (headache).     • aspirin (ASA) 81 MG Chew Tab chewable tablet Take 81 mg by mouth every day.       No current facility-administered  medications for this visit.       Primary care follow-up:  New health conditions identified during hospitalization? Yes , kidney stone, mild anemia, prediabetes  Labs/pathology/imaging requires future PCP follow-up?  Yes , anemia workup is indicated. Monitor blood sugar.  Changes to medications during hospitalization or today? Yes , see discharge medications    Recommended followup:  with Brendan Hargrove M.D. in 3 weeks. We will fax records to him.  The patient has scheduling follow-up with Dr. Garcia      Patient Instruction  Patient provided with educational material on discharge diagnosis and management of symptoms/red flags. Patient instructed to keep follow-up appointments and to bring written questions and and actual medications to each office visit. Patient instructed to call PCP/specialist with any problems/questions/concerns. Patient verbalizes understanding and has no further questions at this time.    Face-to-face transitional care management services with high medical decision complexity were provided.

## 2017-07-12 NOTE — MR AVS SNAPSHOT
"        Carl Finney   2017 1:00 PM   Office Visit   MRN: [id number]    Department:  [facility]   Dept Phone:  [phone]    Description:  Male : 1950   Provider:  Lisette Govea M.D.           Reason for Visit     Hospital Follow-up Acute UTI; feeling better.       Allergies as of 2017     Allergen Noted Reactions    Ceftriaxone 2017   Rash    Rash, fever      You were diagnosed with     Hospital discharge follow-up   [284253]       Hydronephrosis of left kidney   [287024]       Renal stones   [211373]         Vital Signs     Blood Pressure Pulse Temperature Respirations Height Weight    128/80 mmHg 78 36.4 °C (97.6 °F) 16 1.88 m (6' 2.02\") 76.658 kg (169 lb)    Body Mass Index Oxygen Saturation Smoking Status             21.69 kg/m2 97% Never Smoker          Basic Information     Date Of Birth Sex Race Ethnicity Preferred Language    1950 Male White Non- English      Problem List              ICD-10-CM Priority Class Noted - Resolved    Right inguinal hernia K40.90   2016 - Present    Sepsis (CMS-HCC) A41.9   2017 - Present    Complicated UTI (urinary tract infection) N39.0   2017 - Present    History of prostatectomy Z90.79   2017 - Present    Hydronephrosis of left kidney N13.30   2017 - Present    Renal stone, left N20.0   2017 - Present    Acute metabolic encephalopathy G93.41   2017 - Present      Health Maintenance        Date Due Completion Dates    IMM DTaP/Tdap/Td Vaccine (1 - Tdap) 3/6/1970 ---    COLONOSCOPY 3/6/2001 ---    IMM ZOSTER VACCINE 3/6/2011 ---    IMM INFLUENZA (1) 2017    IMM PNEUMOCOCCAL 65+ (ADULT) LOW/MEDIUM RISK SERIES (2 of 2 - PPSV23) 2020, 2015            Current Immunizations     13-VALENT PCV PREVNAR 2017 11:24 AM    Influenza TIV (IM) 2016    Pneumococcal polysaccharide vaccine (PPSV-23) 2015      Below and/or attached are the medications your provider " expects you to take. Review all of your home medications and newly ordered medications with your provider and/or pharmacist. Follow medication instructions as directed by your provider and/or pharmacist. Please keep your medication list with you and share with your provider. Update the information when medications are discontinued, doses are changed, or new medications (including over-the-counter products) are added; and carry medication information at all times in the event of emergency situations     Allergies:  CEFTRIAXONE - Rash               Medications  Valid as of: July 12, 2017 -  1:37 PM    Generic Name Brand Name Tablet Size Instructions for use    Amoxicillin-Pot Clavulanate (Tab) AUGMENTIN 875-125 MG Take 1 Tab by mouth 2 times a day for 10 days.        Aspirin (Tab)  MG Take 650 mg by mouth every 6 hours as needed (headache).        Aspirin (Chew Tab) ASA 81 MG Take 81 mg by mouth every day.        K Phos Fentress-Sod Phos Di & Mono (Tab) K-PHOS-NEUTRAL, PHOSPHA 250 NEUTRAL 155-852-130 MG Take 2 Tabs by mouth 2 Times a Day for 4 days.        Phenazopyridine HCl (Tab) PYRIDIUM 200 MG Take 1 Tab by mouth 3 times a day as needed (dysuria).        .                 Medicines prescribed today were sent to:     [id number] - [place], [place] - [address]    [address] [facility] [address]    Phone: [phone] Fax: [phone]    Open 24 Hours?: No      Medication refill instructions:       If your prescription bottle indicates you have medication refills left, it is not necessary to call your provider’s office. Please contact your pharmacy and they will refill your medication.    If your prescription bottle indicates you do not have any refills left, you may request refills at any time through one of the following ways: The online [employer] system (except Urgent Care), by calling your provider’s office, or by asking your pharmacy to contact your provider’s office with a refill  request. Medication refills are processed only during regular business hours and may not be available until the next business day. Your provider may request additional information or to have a follow-up visit with you prior to refilling your medication.   *Please Note: Medication refills are assigned a new Rx number when refilled electronically. Your pharmacy may indicate that no refills were authorized even though a new prescription for the same medication is available at the pharmacy. Please request the medicine by name with the pharmacy before contacting your provider for a refill.        Instructions    If you need further assistance, or have any questions; concerns or lingering symptoms before seeing your Primary Care Provider or specialist.     Do not hesitate to contact us.     Please contact us at the Post-Hospital Follow Up Program at [phone].   Our offices hours are Monday-Friday 8 am-5 pm.            [facility] Access Code: [email or web address]  Expires: 8/8/2017 11:31 AM    [facility]  A secure, online tool to manage your health information     [employer]s [facility]® is a secure, online tool that connects you to your personalized health information from the privacy of your home -- day or night - making it very easy for you to manage your healthcare. Once the activation process is completed, you can even access your medical information using the [facility] linda, which is available for free in the Apple Linda store or Google Play store.     [facility] provides the following levels of access (as shown below):   My Chart Features   [facility] Primary Care Doctor [facility]  Specialists [facility]  Urgent  Care Non-[facility]  Primary Care  Doctor   Email your healthcare team securely and privately 24/7 X X X    Manage appointments: schedule your next appointment; view details of past/upcoming appointments X      Request prescription refills. X      View recent personal medical records, including lab and immunizations X X X X   View  health record, including health history, allergies, medications X X X X   Read reports about your outpatient visits, procedures, consult and ER notes X X X X   See your discharge summary, which is a recap of your hospital and/or ER visit that includes your diagnosis, lab results, and care plan. X X       How to register for [employer]:  1. Go to  [email or web address].  2. Click on the Sign Up Now box, which takes you to the New Member Sign Up page. You will need to provide the following information:  a. Enter your [employer] Access Code exactly as it appears at the top of this page. (You will not need to use this code after you’ve completed the sign-up process. If you do not sign up before the expiration date, you must request a new code.)   b. Enter your date of birth.   c. Enter your home email address.   d. Click Submit, and follow the next screen’s instructions.  3. Create a [employer] ID. This will be your [employer] login ID and cannot be changed, so think of one that is secure and easy to remember.  4. Create a [place] [employer] password. You can change your password at any time.  5. Enter your Password Reset Question and Answer. This can be used at a later time if you forget your password.   6. Enter your e-mail address. This allows you to receive e-mail notifications when new information is available in [employer].  7. Click Sign Up. You can now view your health information.    For assistance activating your [employer] account, call [phone]

## 2017-07-12 NOTE — PATIENT INSTRUCTIONS
If you need further assistance, or have any questions; concerns or lingering symptoms before seeing your Primary Care Provider or specialist.     Do not hesitate to contact us.     Please contact us at the Post-Hospital Follow Up Program at [phone].   Our offices hours are Monday-Friday 8 am-5 pm.

## 2017-07-13 ENCOUNTER — TELEPHONE (OUTPATIENT)
Dept: MEDICAL GROUP | Facility: CLINIC | Age: 67
End: 2017-07-13

## 2017-07-13 NOTE — TELEPHONE ENCOUNTER
1). Left voicemail for office as patient needs to follow up with PCP in 3 weeks. Their voicemail says that if we hear this message, it is either after hours or all   Since I had to leave a voicemail;  2). Received a voicemail from Elyssa @ Dr. Hargrove, she received my voicemail & she called the patient to schedule. Per Elyssa, patient requested she call me to schedule.  3). Returned Elyssa's call & scheduled patient a follow up appointment with Dr Hargrove on 08/08/17 9:45 am with a check in time of 9:30 am.   4). Called patient on number [phone] (home) and notified him of appointment made. He said ok and that this was an earlier appointment than what they offered him.    No further action required.

## 2017-08-23 DIAGNOSIS — Z01.812 PRE-OPERATIVE LABORATORY EXAMINATION: ICD-10-CM

## 2017-08-23 LAB
APPEARANCE UR: CLEAR
BACTERIA #/AREA URNS HPF: NEGATIVE /HPF
BASOPHILS # BLD AUTO: 1 % (ref 0–1.8)
BASOPHILS # BLD: 0.06 K/UL (ref 0–0.12)
BILIRUB UR QL STRIP.AUTO: NEGATIVE
COLOR UR: YELLOW
EOSINOPHIL # BLD AUTO: 0.1 K/UL (ref 0–0.51)
EOSINOPHIL NFR BLD: 1.7 % (ref 0–6.9)
EPI CELLS #/AREA URNS HPF: ABNORMAL /HPF
ERYTHROCYTE [DISTWIDTH] IN BLOOD BY AUTOMATED COUNT: 46.2 FL (ref 35.9–50)
GLUCOSE UR STRIP.AUTO-MCNC: NEGATIVE MG/DL
HCT VFR BLD AUTO: 45.5 % (ref 42–52)
HGB BLD-MCNC: 15.2 G/DL (ref 14–18)
HYALINE CASTS #/AREA URNS LPF: ABNORMAL /LPF
IMM GRANULOCYTES # BLD AUTO: 0.03 K/UL (ref 0–0.11)
IMM GRANULOCYTES NFR BLD AUTO: 0.5 % (ref 0–0.9)
INR PPP: 1 (ref 0.87–1.13)
KETONES UR STRIP.AUTO-MCNC: NEGATIVE MG/DL
LEUKOCYTE ESTERASE UR QL STRIP.AUTO: ABNORMAL
LYMPHOCYTES # BLD AUTO: 1.39 K/UL (ref 1–4.8)
LYMPHOCYTES NFR BLD: 23.7 % (ref 22–41)
MCH RBC QN AUTO: 28.8 PG (ref 27–33)
MCHC RBC AUTO-ENTMCNC: 33.4 G/DL (ref 33.7–35.3)
MCV RBC AUTO: 86.3 FL (ref 81.4–97.8)
MICRO URNS: ABNORMAL
MONOCYTES # BLD AUTO: 0.37 K/UL (ref 0–0.85)
MONOCYTES NFR BLD AUTO: 6.3 % (ref 0–13.4)
NEUTROPHILS # BLD AUTO: 3.92 K/UL (ref 1.82–7.42)
NEUTROPHILS NFR BLD: 66.8 % (ref 44–72)
NITRITE UR QL STRIP.AUTO: NEGATIVE
NRBC # BLD AUTO: 0 K/UL
NRBC BLD AUTO-RTO: 0 /100 WBC
PH UR STRIP.AUTO: 7 [PH]
PLATELET # BLD AUTO: 205 K/UL (ref 164–446)
PMV BLD AUTO: 9.5 FL (ref 9–12.9)
PROT UR QL STRIP: 30 MG/DL
PROTHROMBIN TIME: 13.5 SEC (ref 12–14.6)
RBC # BLD AUTO: 5.27 M/UL (ref 4.7–6.1)
RBC # URNS HPF: >150 /HPF
RBC UR QL AUTO: ABNORMAL
SP GR UR STRIP.AUTO: 1.01
TRANS CELLS #/AREA URNS HPF: ABNORMAL /HPF
UROBILINOGEN UR STRIP.AUTO-MCNC: 0.2 MG/DL
WBC # BLD AUTO: 5.9 K/UL (ref 4.8–10.8)
WBC #/AREA URNS HPF: ABNORMAL /HPF

## 2017-08-23 PROCEDURE — 81001 URINALYSIS AUTO W/SCOPE: CPT

## 2017-08-23 PROCEDURE — 36415 COLL VENOUS BLD VENIPUNCTURE: CPT

## 2017-08-23 PROCEDURE — 85610 PROTHROMBIN TIME: CPT

## 2017-08-23 PROCEDURE — 87086 URINE CULTURE/COLONY COUNT: CPT

## 2017-08-23 PROCEDURE — 85025 COMPLETE CBC W/AUTO DIFF WBC: CPT

## 2017-08-23 RX ORDER — OXYCODONE HYDROCHLORIDE AND ACETAMINOPHEN 5; 325 MG/1; MG/1
.5-1 TABLET ORAL EVERY 4 HOURS PRN
Status: ON HOLD | COMMUNITY
End: 2023-02-21

## 2017-08-25 LAB
BACTERIA UR CULT: NORMAL
SIGNIFICANT IND 70042: NORMAL
SITE SITE: NORMAL
SOURCE SOURCE: NORMAL

## 2017-08-28 ENCOUNTER — APPOINTMENT (OUTPATIENT)
Dept: RADIOLOGY | Facility: MEDICAL CENTER | Age: 67
End: 2017-08-28
Attending: UROLOGY
Payer: MEDICARE

## 2017-08-28 ENCOUNTER — HOSPITAL ENCOUNTER (OUTPATIENT)
Facility: MEDICAL CENTER | Age: 67
End: 2017-08-29
Attending: UROLOGY | Admitting: UROLOGY
Payer: MEDICARE

## 2017-08-28 DIAGNOSIS — N20.0 KIDNEY STONES: ICD-10-CM

## 2017-08-28 PROCEDURE — 700102 HCHG RX REV CODE 250 W/ 637 OVERRIDE(OP): Performed by: UROLOGY

## 2017-08-28 PROCEDURE — 700101 HCHG RX REV CODE 250

## 2017-08-28 PROCEDURE — 501161 HCHG PROBE, SWISS LITHOCLAST: Performed by: UROLOGY

## 2017-08-28 PROCEDURE — A4338 INDWELLING CATHETER LATEX: HCPCS | Performed by: UROLOGY

## 2017-08-28 PROCEDURE — 160002 HCHG RECOVERY MINUTES (STAT): Performed by: UROLOGY

## 2017-08-28 PROCEDURE — 96365 THER/PROPH/DIAG IV INF INIT: CPT

## 2017-08-28 PROCEDURE — A6402 STERILE GAUZE <= 16 SQ IN: HCPCS | Performed by: UROLOGY

## 2017-08-28 PROCEDURE — 501159 HCHG PROBE, LAP: Performed by: UROLOGY

## 2017-08-28 PROCEDURE — A9270 NON-COVERED ITEM OR SERVICE: HCPCS | Performed by: UROLOGY

## 2017-08-28 PROCEDURE — 700101 HCHG RX REV CODE 250: Performed by: UROLOGY

## 2017-08-28 PROCEDURE — 160036 HCHG PACU - EA ADDL 30 MINS PHASE I: Performed by: UROLOGY

## 2017-08-28 PROCEDURE — 160048 HCHG OR STATISTICAL LEVEL 1-5: Performed by: UROLOGY

## 2017-08-28 PROCEDURE — 500189 HCHG CATH, COUNCIL TIP 20FR: Performed by: UROLOGY

## 2017-08-28 PROCEDURE — 501138 HCHG PLUG, FOLEY CATH: Performed by: UROLOGY

## 2017-08-28 PROCEDURE — C1758 CATHETER, URETERAL: HCPCS | Performed by: UROLOGY

## 2017-08-28 PROCEDURE — 700111 HCHG RX REV CODE 636 W/ 250 OVERRIDE (IP)

## 2017-08-28 PROCEDURE — 501329 HCHG SET, CYSTO IRRIG Y TUR: Performed by: UROLOGY

## 2017-08-28 PROCEDURE — A9270 NON-COVERED ITEM OR SERVICE: HCPCS

## 2017-08-28 PROCEDURE — C1729 CATH, DRAINAGE: HCPCS | Performed by: UROLOGY

## 2017-08-28 PROCEDURE — 50433 PLMT NEPHROURETERAL CATHETER: CPT | Performed by: RADIOLOGY

## 2017-08-28 PROCEDURE — 82365 CALCULUS SPECTROSCOPY: CPT

## 2017-08-28 PROCEDURE — 160002 HCHG RECOVERY MINUTES (STAT)

## 2017-08-28 PROCEDURE — 500042 HCHG BAG, URINARY DRAINAGE (CLOSED): Performed by: UROLOGY

## 2017-08-28 PROCEDURE — 501838 HCHG SUTURE GENERAL: Performed by: UROLOGY

## 2017-08-28 PROCEDURE — 160029 HCHG SURGERY MINUTES - 1ST 30 MINS LEVEL 4: Performed by: UROLOGY

## 2017-08-28 PROCEDURE — C1769 GUIDE WIRE: HCPCS | Performed by: UROLOGY

## 2017-08-28 PROCEDURE — 74420 UROGRAPHY RTRGR +-KUB: CPT

## 2017-08-28 PROCEDURE — 160035 HCHG PACU - 1ST 60 MINS PHASE I: Performed by: UROLOGY

## 2017-08-28 PROCEDURE — 160041 HCHG SURGERY MINUTES - EA ADDL 1 MIN LEVEL 4: Performed by: UROLOGY

## 2017-08-28 PROCEDURE — 700111 HCHG RX REV CODE 636 W/ 250 OVERRIDE (IP): Performed by: UROLOGY

## 2017-08-28 PROCEDURE — G0378 HOSPITAL OBSERVATION PER HR: HCPCS

## 2017-08-28 PROCEDURE — 88300 SURGICAL PATH GROSS: CPT

## 2017-08-28 PROCEDURE — 160009 HCHG ANES TIME/MIN: Performed by: UROLOGY

## 2017-08-28 PROCEDURE — 700102 HCHG RX REV CODE 250 W/ 637 OVERRIDE(OP)

## 2017-08-28 PROCEDURE — 99153 MOD SED SAME PHYS/QHP EA: CPT

## 2017-08-28 RX ORDER — ZOLPIDEM TARTRATE 5 MG/1
5 TABLET ORAL NIGHTLY PRN
Status: DISCONTINUED | OUTPATIENT
Start: 2017-08-28 | End: 2017-08-29 | Stop reason: HOSPADM

## 2017-08-28 RX ORDER — POLYETHYLENE GLYCOL 3350 17 G/17G
1 POWDER, FOR SOLUTION ORAL DAILY
Status: DISCONTINUED | OUTPATIENT
Start: 2017-08-28 | End: 2017-08-29 | Stop reason: HOSPADM

## 2017-08-28 RX ORDER — LIDOCAINE HYDROCHLORIDE 10 MG/ML
INJECTION, SOLUTION INFILTRATION; PERINEURAL
Status: COMPLETED
Start: 2017-08-28 | End: 2017-08-28

## 2017-08-28 RX ORDER — MIDAZOLAM HYDROCHLORIDE 1 MG/ML
INJECTION INTRAMUSCULAR; INTRAVENOUS
Status: COMPLETED
Start: 2017-08-28 | End: 2017-08-28

## 2017-08-28 RX ORDER — DEXTROSE MONOHYDRATE, SODIUM CHLORIDE, AND POTASSIUM CHLORIDE 50; 1.49; 4.5 G/1000ML; G/1000ML; G/1000ML
INJECTION, SOLUTION INTRAVENOUS CONTINUOUS
Status: DISCONTINUED | OUTPATIENT
Start: 2017-08-28 | End: 2017-08-29 | Stop reason: HOSPADM

## 2017-08-28 RX ORDER — ONDANSETRON 2 MG/ML
4 INJECTION INTRAMUSCULAR; INTRAVENOUS PRN
Status: ACTIVE | OUTPATIENT
Start: 2017-08-28 | End: 2017-08-28

## 2017-08-28 RX ORDER — ATROPA BELLADONNA AND OPIUM 16.2; 6 MG/1; MG/1
SUPPOSITORY RECTAL
Status: COMPLETED
Start: 2017-08-28 | End: 2017-08-28

## 2017-08-28 RX ORDER — SODIUM CHLORIDE 9 MG/ML
500 INJECTION, SOLUTION INTRAVENOUS
Status: ACTIVE | OUTPATIENT
Start: 2017-08-28 | End: 2017-08-28

## 2017-08-28 RX ORDER — MIDAZOLAM HYDROCHLORIDE 1 MG/ML
.5-2 INJECTION INTRAMUSCULAR; INTRAVENOUS PRN
Status: ACTIVE | OUTPATIENT
Start: 2017-08-28 | End: 2017-08-28

## 2017-08-28 RX ORDER — LIDOCAINE AND PRILOCAINE 25; 25 MG/G; MG/G
1 CREAM TOPICAL
Status: COMPLETED | OUTPATIENT
Start: 2017-08-28 | End: 2017-08-28

## 2017-08-28 RX ORDER — ATROPA BELLADONNA AND OPIUM 16.2; 6 MG/1; MG/1
60 SUPPOSITORY RECTAL EVERY 12 HOURS PRN
Status: DISCONTINUED | OUTPATIENT
Start: 2017-08-28 | End: 2017-08-29 | Stop reason: HOSPADM

## 2017-08-28 RX ORDER — ONDANSETRON 2 MG/ML
4 INJECTION INTRAMUSCULAR; INTRAVENOUS EVERY 6 HOURS PRN
Status: DISCONTINUED | OUTPATIENT
Start: 2017-08-28 | End: 2017-08-29 | Stop reason: HOSPADM

## 2017-08-28 RX ORDER — CIPROFLOXACIN 2 MG/ML
400 INJECTION, SOLUTION INTRAVENOUS EVERY 12 HOURS
Status: DISCONTINUED | OUTPATIENT
Start: 2017-08-28 | End: 2017-08-29

## 2017-08-28 RX ORDER — LIDOCAINE HYDROCHLORIDE 10 MG/ML
0.5 INJECTION, SOLUTION INFILTRATION; PERINEURAL
Status: COMPLETED | OUTPATIENT
Start: 2017-08-28 | End: 2017-08-28

## 2017-08-28 RX ORDER — OXYCODONE AND ACETAMINOPHEN 7.5; 325 MG/1; MG/1
1 TABLET ORAL EVERY 4 HOURS PRN
Status: DISCONTINUED | OUTPATIENT
Start: 2017-08-28 | End: 2017-08-29 | Stop reason: HOSPADM

## 2017-08-28 RX ORDER — FAMOTIDINE 20 MG/1
20 TABLET, FILM COATED ORAL EVERY 12 HOURS
Status: DISCONTINUED | OUTPATIENT
Start: 2017-08-28 | End: 2017-08-29 | Stop reason: HOSPADM

## 2017-08-28 RX ORDER — SODIUM CHLORIDE, SODIUM LACTATE, POTASSIUM CHLORIDE, CALCIUM CHLORIDE 600; 310; 30; 20 MG/100ML; MG/100ML; MG/100ML; MG/100ML
INJECTION, SOLUTION INTRAVENOUS CONTINUOUS
Status: DISCONTINUED | OUTPATIENT
Start: 2017-08-28 | End: 2017-08-29 | Stop reason: HOSPADM

## 2017-08-28 RX ADMIN — FAMOTIDINE 20 MG: 20 TABLET, FILM COATED ORAL at 17:12

## 2017-08-28 RX ADMIN — POLYETHYLENE GLYCOL 3350 1 PACKET: 17 POWDER, FOR SOLUTION ORAL at 17:13

## 2017-08-28 RX ADMIN — POTASSIUM CHLORIDE, DEXTROSE MONOHYDRATE AND SODIUM CHLORIDE: 150; 5; 450 INJECTION, SOLUTION INTRAVENOUS at 17:13

## 2017-08-28 RX ADMIN — MIDAZOLAM 1 MG: 1 INJECTION INTRAMUSCULAR; INTRAVENOUS at 09:01

## 2017-08-28 RX ADMIN — ATROPA BELLADONNA AND OPIUM 60 MG: 16.2; 6 SUPPOSITORY RECTAL at 14:10

## 2017-08-28 RX ADMIN — ONDANSETRON 4 MG: 2 INJECTION INTRAMUSCULAR; INTRAVENOUS at 21:08

## 2017-08-28 RX ADMIN — POTASSIUM CHLORIDE, DEXTROSE MONOHYDRATE AND SODIUM CHLORIDE: 150; 5; 450 INJECTION, SOLUTION INTRAVENOUS at 21:45

## 2017-08-28 RX ADMIN — HYDROCODONE BITARTRATE AND ACETAMINOPHEN 30 ML: 2.5; 108 SOLUTION ORAL at 14:55

## 2017-08-28 RX ADMIN — LIDOCAINE HYDROCHLORIDE 0.5 ML: 10 INJECTION, SOLUTION INFILTRATION; PERINEURAL at 07:50

## 2017-08-28 RX ADMIN — FAMOTIDINE 20 MG: 20 TABLET, FILM COATED ORAL at 21:00

## 2017-08-28 RX ADMIN — MIDAZOLAM HYDROCHLORIDE 1 MG: 1 INJECTION INTRAMUSCULAR; INTRAVENOUS at 09:15

## 2017-08-28 RX ADMIN — OXYCODONE HYDROCHLORIDE AND ACETAMINOPHEN 1 TABLET: 7.5; 325 TABLET ORAL at 19:43

## 2017-08-28 RX ADMIN — SODIUM CHLORIDE, SODIUM LACTATE, POTASSIUM CHLORIDE, CALCIUM CHLORIDE: 600; 310; 30; 20 INJECTION, SOLUTION INTRAVENOUS at 07:50

## 2017-08-28 RX ADMIN — FENTANYL CITRATE 25 MCG: 50 INJECTION, SOLUTION INTRAMUSCULAR; INTRAVENOUS at 09:01

## 2017-08-28 RX ADMIN — MIDAZOLAM HYDROCHLORIDE 1 MG: 1 INJECTION INTRAMUSCULAR; INTRAVENOUS at 09:01

## 2017-08-28 RX ADMIN — CIPROFLOXACIN 400 MG: 2 INJECTION, SOLUTION INTRAVENOUS at 21:00

## 2017-08-28 ASSESSMENT — PAIN SCALES - GENERAL
PAINLEVEL_OUTOF10: 3
PAINLEVEL_OUTOF10: 5
PAINLEVEL_OUTOF10: 2
PAINLEVEL_OUTOF10: 0
PAINLEVEL_OUTOF10: 1
PAINLEVEL_OUTOF10: 3
PAINLEVEL_OUTOF10: 0
PAINLEVEL_OUTOF10: 0
PAINLEVEL_OUTOF10: 3
PAINLEVEL_OUTOF10: 7
PAINLEVEL_OUTOF10: 0
PAINLEVEL_OUTOF10: 3
PAINLEVEL_OUTOF10: 4
PAINLEVEL_OUTOF10: 4

## 2017-08-28 ASSESSMENT — LIFESTYLE VARIABLES
EVER_SMOKED: NEVER
DO YOU DRINK ALCOHOL: NO

## 2017-08-28 ASSESSMENT — PAIN SCALES - WONG BAKER
WONGBAKER_NUMERICALRESPONSE: HURTS JUST A LITTLE BIT

## 2017-08-28 NOTE — OR SURGEON
Operative Report    PreOp Diagnosis: L renal stones    PostOp Diagnosis: same    Procedure(s):  PERCUTANEOUS NEPHROSTOLITHOTOMY (Medium - 4cm of stone) - Wound Class: Clean Contaminated    Surgeon(s):  Zaki Bustos M.D.    Anesthesiologist/Type of Anesthesia:  Anesthesiologist: Mannie Mendoza M.D./General    Surgical Staff:  Circulator: Jennifer Hammer R.N.  Relief Circulator: Madelyn Lopez R.N.  Scrub Person: Rony Emanuel    Specimens:  L renal stones    Estimated Blood Loss: 100ml    Findings: large L renal stones    Complications: none    Drains:  1WuG07ml L ureteral stent (previously placed - left in place), 20Fr [place] tip davis as L nephrostomy tube; 16Fr davis    8/28/2017 1:39 PM Zaki Bustos

## 2017-08-28 NOTE — OR SURGEON
Immediate Post- Operative Note        PostOp Diagnosis: left renal stone, surgery scheduled today      Procedure(s): left nephrostomy tube-4 Fr diagnostic catheter with the tip in the distal ureter-through the calyx bearing inferior calyx    Estimated Blood Loss: Less than 5 ml        Complications: None            8/28/2017     9:17 AM     Hector Red

## 2017-08-28 NOTE — OP REPORT
DATE OF SERVICE:  08/28/2017    PREOPERATIVE DIAGNOSIS:  Left renal stones.    POSTOPERATIVE DIAGNOSIS:  Left renal stones.    PROCEDURE PERFORMED:  Percutaneous nephrolithotomy on the left for medium size   stone burden approximately 4 cm of stone.    SURGEON:  Zaki Bustos MD    ANESTHESIOLOGIST:  Mannie Mendoza MD    ANESTHESIA:  General.    INDICATIONS FOR PROCEDURE:  The patient is a 67-year-old male had a stent   placed recently for her left UPJ stone with obstruction.  Due to the large   stone burden, he was referred to me for percutaneous nephrolithotomy.    DESCRIPTION OF PROCEDURE:  Prior to coming to surgery, the patient had a left   nephrostomy tube placed by interventional radiology, after which patient was   brought into the operating room.  After obtaining informed consent, the   patient had general anesthesia after which a Lucas catheter was placed and the   patient was positioned prone on the operating table.  All pressure points   were padded.  The left flank was then prepped and draped in sterile fashion   including the nephrostomy tube after which a sensor wire was passed down the   nephrostomy tube into the bladder after which the nephrostomy tube was   removed.  A dual-lumen ureteral catheter was used to place a second sensor   wire after which a NephroMax balloon dilator was used to dilate the tract into   the left renal collecting system after which a nephroscope was passed into   the renal collecting system, and a large yellow crystalline stone was seen in   the renal pelvis. Both pneumatic and ultrasonic lithotripter were used to   fragment and suction out the stone.  There were several stone fragments that   were also suctioned out.  There had been a 7-mm stone in the lower pole chaz   where the nephrostomy tube in place.  I slowly withdrew the nephroscope sheath   until the stone was able to be visualized and fragmented and brought out as   well.  No other stone fragments or  stones were seen in the collecting system   either on direct vision or on fluoroscopy.  The previously placed ureteral   stent appeared to be in good position.  So at this point, I placed a 20-Spanish   [place] tip Lucas catheter as a nephrostomy tube through the nephroscope   sheath until it was in the renal pelvis.  I instilled 3 mL of sterile water in   the balloon after which an antegrade nephrostogram was performed showing good   positioning of the tube and good filling of the renal collecting system with   minimal extravasation.  The wires were removed as well as the nephroscope   sheath.  A 2-0 silk was used to secure the nephrostomy tube after which, dry   sterile dressings were placed around the nephrostomy tube site.  The patient   was then awoken from anesthesia and taken to the recovery room in stable   condition.    COMPLICATIONS:  None.    ESTIMATED BLOOD LOSS:  100 mL.    SPECIMENS:  Left renal stone fragments.    DRAINS:  A 6-Olghzwz07 cm left ureteral stent was left in place since it had   been previously placed.  A 16-Spanish Lucas catheter and a 20-Spanish [place]   tip Lucas catheter as a left nephrostomy tube and the plan is to admit the   patient overnight due to his hematuria from the nephrostomy tube site.       ____________________________________     MD DHARA Nguyen / ENOCH    DD:  08/28/2017 13:51:11  DT:  08/28/2017 14:47:25    D#:  [id number]  Job#:  [id number]

## 2017-08-28 NOTE — OR NURSING
Pt AA/Ox4. VSS. Dressing to left flank area, CDI. CMS+. Pt moves all extremities. Pt reports 3-4/10 pain scale. Pain medications given. Pt denies numbness or tingling. No nausea or vomiting. SCDs in place. Intact left nephrostomy tube to gravity, drained 100cc sanguinous drainage. Intact indwelling catheter, drained 225cc red, bloody urine. Report given to ERIC Jeff. Awaiting transport.

## 2017-08-28 NOTE — PROGRESS NOTES
IR Procedure Note:    Dr. Red placed a left nephrostomy tube.  The pt tolerated the procedure well, vital signs stable; ETCo2 baseline 34, with consistent waveform during the procedure.  Gauze and tape applied to left flank, CDI and soft.  Report given to Margoth REYES.  Pt transported to [facility].      Kumpe catheter left in left kidney

## 2017-08-28 NOTE — OR NURSING
Pt via jamshid, accompanied by transport, was transferred to [facility] at 1620. All personal belongings sent with Pt.

## 2017-08-29 VITALS
DIASTOLIC BLOOD PRESSURE: 65 MMHG | SYSTOLIC BLOOD PRESSURE: 120 MMHG | HEIGHT: 74 IN | TEMPERATURE: 99.4 F | HEART RATE: 72 BPM | OXYGEN SATURATION: 94 % | BODY MASS INDEX: 22.63 KG/M2 | RESPIRATION RATE: 18 BRPM | WEIGHT: 176.37 LBS

## 2017-08-29 LAB
ANION GAP SERPL CALC-SCNC: 7 MMOL/L (ref 0–11.9)
BUN SERPL-MCNC: 14 MG/DL (ref 8–22)
CALCIUM SERPL-MCNC: 8.8 MG/DL (ref 8.5–10.5)
CHLORIDE SERPL-SCNC: 105 MMOL/L (ref 96–112)
CO2 SERPL-SCNC: 23 MMOL/L (ref 20–33)
CREAT SERPL-MCNC: 0.79 MG/DL (ref 0.5–1.4)
ERYTHROCYTE [DISTWIDTH] IN BLOOD BY AUTOMATED COUNT: 44.1 FL (ref 35.9–50)
GFR SERPL CREATININE-BSD FRML MDRD: >60 ML/MIN/1.73 M 2
GLUCOSE SERPL-MCNC: 110 MG/DL (ref 65–99)
HCT VFR BLD AUTO: 40.1 % (ref 42–52)
HGB BLD-MCNC: 13.2 G/DL (ref 14–18)
MCH RBC QN AUTO: 28 PG (ref 27–33)
MCHC RBC AUTO-ENTMCNC: 32.9 G/DL (ref 33.7–35.3)
MCV RBC AUTO: 85 FL (ref 81.4–97.8)
PLATELET # BLD AUTO: 145 K/UL (ref 164–446)
PMV BLD AUTO: 9.7 FL (ref 9–12.9)
POTASSIUM SERPL-SCNC: 4 MMOL/L (ref 3.6–5.5)
RBC # BLD AUTO: 4.72 M/UL (ref 4.7–6.1)
SODIUM SERPL-SCNC: 135 MMOL/L (ref 135–145)
WBC # BLD AUTO: 6.8 K/UL (ref 4.8–10.8)

## 2017-08-29 PROCEDURE — 302043 TAPE, HYPAFIX: Performed by: UROLOGY

## 2017-08-29 PROCEDURE — 85027 COMPLETE CBC AUTOMATED: CPT

## 2017-08-29 PROCEDURE — 90471 IMMUNIZATION ADMIN: CPT

## 2017-08-29 PROCEDURE — G0378 HOSPITAL OBSERVATION PER HR: HCPCS

## 2017-08-29 PROCEDURE — 90662 IIV NO PRSV INCREASED AG IM: CPT | Performed by: UROLOGY

## 2017-08-29 PROCEDURE — 96367 TX/PROPH/DG ADDL SEQ IV INF: CPT

## 2017-08-29 PROCEDURE — 80048 BASIC METABOLIC PNL TOTAL CA: CPT

## 2017-08-29 PROCEDURE — 700102 HCHG RX REV CODE 250 W/ 637 OVERRIDE(OP): Performed by: UROLOGY

## 2017-08-29 PROCEDURE — A9270 NON-COVERED ITEM OR SERVICE: HCPCS | Performed by: UROLOGY

## 2017-08-29 PROCEDURE — 36415 COLL VENOUS BLD VENIPUNCTURE: CPT

## 2017-08-29 PROCEDURE — 700111 HCHG RX REV CODE 636 W/ 250 OVERRIDE (IP): Performed by: UROLOGY

## 2017-08-29 RX ORDER — OXYCODONE HYDROCHLORIDE AND ACETAMINOPHEN 5; 325 MG/1; MG/1
1-2 TABLET ORAL EVERY 4 HOURS PRN
Qty: 15 TAB | Refills: 0 | Status: ON HOLD | OUTPATIENT
Start: 2017-08-29 | End: 2023-02-21

## 2017-08-29 RX ORDER — CIPROFLOXACIN 500 MG/1
500 TABLET, FILM COATED ORAL EVERY 12 HOURS
Status: DISCONTINUED | OUTPATIENT
Start: 2017-08-29 | End: 2017-08-29 | Stop reason: HOSPADM

## 2017-08-29 RX ADMIN — OXYCODONE HYDROCHLORIDE AND ACETAMINOPHEN 1 TABLET: 7.5; 325 TABLET ORAL at 13:59

## 2017-08-29 RX ADMIN — CIPROFLOXACIN 400 MG: 2 INJECTION, SOLUTION INTRAVENOUS at 08:43

## 2017-08-29 RX ADMIN — OXYCODONE HYDROCHLORIDE AND ACETAMINOPHEN 1 TABLET: 7.5; 325 TABLET ORAL at 05:39

## 2017-08-29 RX ADMIN — INFLUENZA A VIRUSA/MICHIGAN/45/2015 X-275 (H1N1) ANTIGEN (FORMALDEHYDE INACTIVATED), INFLUENZA A VIRUS A/HONG KONG/4801/2014 X-263B (H3N2) ANTIGEN (FORMALDEHYDE INACTIVATED), AND INFLUENZA B VIRUS B/BRISBANE/60/2008 ANTIGEN (FORMALDEHYDE INACTIVATED) 0.5 ML: 60; 60; 60 INJECTION, SUSPENSION INTRAMUSCULAR at 05:39

## 2017-08-29 RX ADMIN — FAMOTIDINE 20 MG: 20 TABLET, FILM COATED ORAL at 08:43

## 2017-08-29 ASSESSMENT — PAIN SCALES - GENERAL
PAINLEVEL_OUTOF10: 3
PAINLEVEL_OUTOF10: 3
PAINLEVEL_OUTOF10: ASSUMED PAIN PRESENT
PAINLEVEL_OUTOF10: ASSUMED PAIN PRESENT

## 2017-08-29 ASSESSMENT — ENCOUNTER SYMPTOMS
FLANK PAIN: 1
VOMITING: 0
NAUSEA: 0
FEVER: 0
ABDOMINAL PAIN: 0
CHILLS: 0

## 2017-08-29 ASSESSMENT — PAIN SCALES - WONG BAKER: WONGBAKER_NUMERICALRESPONSE: HURTS JUST A LITTLE BIT

## 2017-08-29 NOTE — PROGRESS NOTES
"Pt arrived to room T419 via [facility]. Pt moved self to the bed from [facility]. AA&OX4. Pain rating at 1. Denies pain intervention at this time. No c/o n/v, n/t, SOB. Pt on 1L NC sats at 97%. Pt placed on clear liquid diet. Tolerating water. Left flank dressing intact with minimal old drainage with shadow. neph tube with sang output. Lucas to gravity with clear urine output. Pt on bedrest with bathroom privileges. Plan of care discussed. Pt oriented to floor and unit. Plan of care discussed. Hourly rounding in place.  on, SCDs on. Call light within reach. Blood pressure 125/83, pulse (!) 53, temperature 36.2 °C (97.1 °F), resp. rate 18, height 1.88 m (6' 2\"), weight 80 kg (176 lb 5.9 oz), SpO2 96 %.      "

## 2017-08-29 NOTE — PROGRESS NOTES
Received bedside report from ERIC Cummings. Pt is AAOx4. LIZ. VSS. Pt c/o L flank pain. Denies SOB. -N/V. +BS. +Flatus. -BM. Pt has L flank nephrostomy tube; dressing CDI. Pt has red tinged urine in down drain bag. Lucas d/c'd at 0600; pt has been able to void. Pt up SBA; tolerates well. POC discussed. Bed locked and in the lowest position. Call light w/in reach. Hourly rounding in place

## 2017-08-29 NOTE — PROGRESS NOTES
Pt d/c home. PIV removed. Pt and wife educated on how to change dressing. Scripts and d/c instructions given to pt

## 2017-08-29 NOTE — PROGRESS NOTES
Pt refuses bed alarm. educated regarding fall risk and safety. Bed is locked and in the lowest position. Call light and possessions in reach. Treaded socks on. Floor free of trip hazards. Pt educated on use of call light. Hourly rounding in place.

## 2017-08-29 NOTE — DIETARY
Nutrition Services     Pt noted with nutrition admit screen trigger: unplanned wt loss     Pt is a 66 yo male with adm dx: Kidney stone    Per chart review, pt was hospitalized 7/6-7/9 d/t possible stroke. During pt's admission his wt was 80.3 kg.  Pt's current wt (8/28) 80 kg.  Per chart review, pt does not have any wt changes over the past month.   Body mass index is 22.64 kg/m². - WNL     He is receiving a regular diet with po intake of % at breakfast his morning.     Pt is POD#1 s/p percutaneous nephrolithotomy on the left for medium size stone burden approximately 4 cm of stone per chart review.     Recommendations:   · Encourage po intake   · Record percentage of meals conusmed in ADLs to help monitor po adequacy  · Nutrition rep to see pt daily for meal preferences     RD will con't to monitor per dept policy

## 2017-08-29 NOTE — DISCHARGE INSTRUCTIONS
ACTIVITY: Rest and take it easy for the first 24 hours.  A responsible adult is recommended to remain with you during that time.  It is normal to feel sleepy.  We encourage you to not do anything that requires balance, judgment or coordination.    MILD FLU-LIKE SYMPTOMS ARE NORMAL. YOU MAY EXPERIENCE GENERALIZED MUSCLE ACHES, THROAT IRRITATION, HEADACHE AND/OR SOME NAUSEA.    FOR 24 HOURS DO NOT:  Drive, operate machinery or run household appliances.  Drink beer or alcoholic beverages.   Make important decisions or sign legal documents.    SPECIAL INSTRUCTIONS: Heart healthy diet    DIET: To avoid nausea, slowly advance diet as tolerated, avoiding spicy or greasy foods for the first day.  Add more substantial food to your diet according to your physician's instructions.  Babies can be fed formula or breast milk as soon as they are hungry.  INCREASE FLUIDS AND FIBER TO AVOID CONSTIPATION.    SURGICAL DRESSING/BATHING: Change dressing as needed    FOLLOW-UP APPOINTMENT:  A follow-up appointment should be arranged with your doctor in 1 week; call to schedule.    You should CALL YOUR PHYSICIAN if you develop:  Fever greater than 101 degrees F.  Pain not relieved by medication, or persistent nausea or vomiting.  Excessive bleeding (blood soaking through dressing) or unexpected drainage from the wound.  Extreme redness or swelling around the incision site, drainage of pus or foul smelling drainage.  Inability to urinate or empty your bladder within 8 hours.  Problems with breathing or chest pain.    You should call 911 if you develop problems with breathing or chest pain.  If you are unable to contact your doctor or surgical center, you should go to the nearest emergency room or urgent care center.        A registered nurse may call you a few days after your surgery to see how you are doing after your procedure.    MEDICATIONS: Resume taking daily medication.  Take prescribed pain medication with food.  If no medication  is prescribed, you may take non-aspirin pain medication if needed.  PAIN MEDICATION CAN BE VERY CONSTIPATING.  Take a stool softener or laxative such as senokot, pericolace, or milk of magnesia if needed.    Prescription given for Percocet.  Last pain medication given at 1400.    If your physician has prescribed pain medication that includes Acetaminophen (Tylenol), do not take additional Acetaminophen (Tylenol) while taking the prescribed medication.    Depression / Suicide Risk    As you are discharged from this [facility] facility, it is important to learn how to keep safe from harming yourself.    Recognize the warning signs:  · Abrupt changes in personality, positive or negative- including increase in energy   · Giving away possessions  · Change in eating patterns- significant weight changes-  positive or negative  · Change in sleeping patterns- unable to sleep or sleeping all the time   · Unwillingness or inability to communicate  · Depression  · Unusual sadness, discouragement and loneliness  · Talk of wanting to die  · Neglect of personal appearance   · Rebelliousness- reckless behavior  · Withdrawal from people/activities they love  · Confusion- inability to concentrate     If you or a loved one observes any of these behaviors or has concerns about self-harm, here's what you can do:  · Talk about it- your feelings and reasons for harming yourself  · Remove any means that you might use to hurt yourself (examples: pills, rope, extension cords, firearm)  · Get professional help from the community (Mental Health, Substance Abuse, psychological counseling)  · Do not be alone:Call your Safe Contact- someone whom you trust who will be there for you.  · Call your local CRISIS HOTLINE 641-1656 or 689-392-5334  · Call your local Children's Mobile Crisis Response Team [place] (168) 243-2499 or www.MemfoACT  · Call the toll free National Suicide Prevention Hotlines   · National Suicide Prevention  [facility] 981-726-QJII (7338)  · [facility] 800-SUICIDE (360-7906)      Discharge Instructions    Discharged to home by car with relative. Discharged via wheelchair, hospital escort: Yes.  Special equipment needed: Not Applicable    Be sure to schedule a follow-up appointment with your primary care doctor or any specialists as instructed.     Discharge Plan:   Diet Plan: Discussed  Activity Level: Discussed  Confirmed Follow up Appointment: Patient to Call and Schedule Appointment  Medication Reconciliation Updated: Yes  Influenza Vaccine Indication: Indicated: 9 to 64 years of age  Influenza Vaccine Given - only chart on this line when given: Influenza Vaccine Given (See MAR)    I understand that a diet low in cholesterol, fat, and sodium is recommended for good health. Unless I have been given specific instructions below for another diet, I accept this instruction as my diet prescription.   Other diet: heart healthy diet    Special Instructions: None    · Is patient discharged on Warfarin / Coumadin?   No     · Is patient Post Blood Transfusion?  No    Depression / Suicide Risk    As you are discharged from this [facility] facility, it is important to learn how to keep safe from harming yourself.    Recognize the warning signs:  · Abrupt changes in personality, positive or negative- including increase in energy   · Giving away possessions  · Change in eating patterns- significant weight changes-  positive or negative  · Change in sleeping patterns- unable to sleep or sleeping all the time   · Unwillingness or inability to communicate  · Depression  · Unusual sadness, discouragement and loneliness  · Talk of wanting to die  · Neglect of personal appearance   · Rebelliousness- reckless behavior  · Withdrawal from people/activities they love  · Confusion- inability to concentrate     If you or a loved one observes any of these behaviors or has concerns about self-harm, here's what you can  do:  · Talk about it- your feelings and reasons for harming yourself  · Remove any means that you might use to hurt yourself (examples: pills, rope, extension cords, firearm)  · Get professional help from the community (Mental Health, Substance Abuse, psychological counseling)  · Do not be alone:Call your Safe Contact- someone whom you trust who will be there for you.  · Call your local CRISIS HOTLINE 060-5587 or 603-448-1105  · Call your local Children's Mobile Crisis Response Team [place] (158) 361-0800 or wwwFubles  · Call the toll free National Suicide Prevention Hotlines   · National Suicide Prevention Lifeline 191-076-CLNS (9196)  · National Hope Line Network 800-SUICIDE (110-7030)    Percutaneous Nephrostomy, Care After  Refer to this sheet in the next few weeks. These instructions provide you with information on caring for yourself after your procedure. Your health care provider may also give you more specific instructions. Your treatment has been planned according to current medical practices, but problems sometimes occur. Call your health care provider if you have any problems or questions after your procedure.  WHAT TO EXPECT AFTER THE PROCEDURE  You will need to remain lying down for several hours.  HOME CARE INSTRUCTIONS  · Your nephrostomy tube is connected to a leg bag or bedside drainage bag. Always keep the tubing, the leg bag, or the bedside drainage bags below the level of the kidney so that the urine drains freely.  · During the day, if you are connecting the nephrostomy tube to a leg bag, be sure there are no kinks in the tubing and that the urine is draining freely.  · At night, you may want to connect the nephrostomy tube or the leg bag to a larger bedside drainage bag.  · Change the dressing as often as directed by your health care provider, or if it becomes wet.  ¨ Gently remove the tapes and dressing from around the nephrostomy tube. Be careful not to pull on the tube while  removing the dressing.  ¨ Wash the skin around the tube, rinse well, and dry.  ¨ Place two split drain sponges in and around the tube exit site.  ¨ Place tape around edge of the dressing.  ¨ Secure the nephrostomy tubing. Remember to make certain that the nephrostomy tube does not kink or become pinched closed. It can be useful to wrap any exposed tubing going from the nephrostomy tube to any of the connecting tubes to either the leg bag or drainage bag with an elastic bandage.  · Every three weeks, replace the leg bag, drainage bag, and any extension tubing connected to your nephrostomy tube. Your health care provider will explain how to change the drainage bag and extension tubing.  SEEK MEDICAL CARE IF:  · You experience any problems with any of the valves or tubing.  · You have persistent pain or soreness in your back.  · You have a fever or chills.  SEEK IMMEDIATE MEDICAL CARE IF:  · You have abdominal pain during the first week.  · You have a new appearance of blood in your urine.  · You have back pain that is not relieved by your medicine.  · You have drainage, redness, swelling, or pain at the tube insertion site.  · You have decreased urine output.  · Your nephrostomy tube comes out.     This information is not intended to replace advice given to you by your health care provider. Make sure you discuss any questions you have with your health care provider.     Document Released: 08/10/2005 Document Revised: 01/08/2016 Document Reviewed: 08/14/2014  [employer] Patient Education ©2016 [employer]

## 2017-08-29 NOTE — CARE PLAN
Problem: Communication  Goal: The ability to communicate needs accurately and effectively will improve  Patient educated regarding use of call light for assistance and communication with nursing staff.    Problem: Urinary Elimination:  Goal: Ability to reestablish a normal urinary elimination pattern will improve  Lucas catheter in place.

## 2017-08-29 NOTE — PROGRESS NOTES
Surgical Progress Note    Author: Mikal Engel Date & Time created: 2017   9:30 AM     Interval Events:   POD#1  Patient seen and examined.  Patient doing well. Has some pain from NPT but otherwise comfortable. Urine has cleared. Tolerates diet. No BM. Lucas out, voiding well.       Review of Systems   Constitutional: Negative for chills and fever.   Gastrointestinal: Negative for abdominal pain, nausea and vomiting.   Genitourinary: Positive for flank pain and hematuria.     Hemodynamics:  Temp (24hrs), Av.6 °C (97.9 °F), Min:36.1 °C (96.9 °F), Max:37.4 °C (99.4 °F)  Temperature: 37.4 °C (99.4 °F)  Pulse  Av.6  Min: 50  Max: 97Heart Rate (Monitored): 61  Blood Pressure : 120/65, NIBP: 127/65     Respiratory:    Respiration: 18, Pulse Oximetry: 94 %        RUL Breath Sounds: Clear, RML Breath Sounds: Clear, RLL Breath Sounds: Diminished, REUL Breath Sounds: Clear, LLL Breath Sounds: Diminished  Neuro:  GCS       Fluids:    Intake/Output Summary (Last 24 hours) at 17 0930  Last data filed at 17 0915   Gross per 24 hour   Intake             2620 ml   Output             3975 ml   Net            -1355 ml        Current Diet Order   Procedures   • DIET ORDER     Physical Exam   Constitutional: He is oriented to person, place, and time. He appears well-developed and well-nourished. No distress.   Cardiovascular: Normal rate, regular rhythm and normal heart sounds.    Pulmonary/Chest: Effort normal and breath sounds normal. No respiratory distress. He has no wheezes. He has no rales.   Abdominal: Soft. Bowel sounds are normal. He exhibits no distension. There is no tenderness.   Genitourinary:   Genitourinary Comments: Left NPT in place draining yellow urine. NPT site leaking urine.   Neurological: He is alert and oriented to person, place, and time.   Skin: Skin is warm and dry.   Psychiatric: He has a normal mood and affect.   Nursing note and vitals reviewed.    Labs:  Recent Results  (from the past 24 hour(s))   CBC WITHOUT DIFFERENTIAL    Collection Time: 08/29/17  2:28 AM   Result Value Ref Range    WBC 6.8 4.8 - 10.8 K/uL    RBC 4.72 4.70 - 6.10 M/uL    Hemoglobin 13.2 (L) 14.0 - 18.0 g/dL    Hematocrit 40.1 (L) 42.0 - 52.0 %    MCV 85.0 81.4 - 97.8 fL    MCH 28.0 27.0 - 33.0 pg    MCHC 32.9 (L) 33.7 - 35.3 g/dL    RDW 44.1 35.9 - 50.0 fL    Platelet Count 145 (L) 164 - 446 K/uL    MPV 9.7 9.0 - 12.9 fL   BASIC METABOLIC PANEL    Collection Time: 08/29/17  2:28 AM   Result Value Ref Range    Sodium 135 135 - 145 mmol/L    Potassium 4.0 3.6 - 5.5 mmol/L    Chloride 105 96 - 112 mmol/L    Co2 23 20 - 33 mmol/L    Glucose 110 (H) 65 - 99 mg/dL    Bun 14 8 - 22 mg/dL    Creatinine 0.79 0.50 - 1.40 mg/dL    Calcium 8.8 8.5 - 10.5 mg/dL    Anion Gap 7.0 0.0 - 11.9   ESTIMATED GFR    Collection Time: 08/29/17  2:28 AM   Result Value Ref Range    GFR If African American >60 >60 mL/min/1.73 m 2    GFR If Non African American >60 >60 mL/min/1.73 m 2     Plan:  1. Left nephrolithiasis s/p left PCNL 8/28/17    Lucas out  NPT removed  Home with Percocet  Follow up in office next week for stent removal      Quality Measures:    Reviewed items::  Medications reviewed  Lucas catheter::  No Lucas      Discussed patient condition with RN, Patient and urology-Dr. Bustos

## 2017-08-29 NOTE — PROGRESS NOTES
Report received from day shift RN, patient care assumed at 1900.   Patient assessment as documented.   Patient denies pain or discomfort at this time.   Breathing is even and unlabored on 0.5L02 with .   Patient is ambulatory with SBA.   SCDs in use.   L nephrostomy tube noted to L flank. Dressing saturated with serous fluid, changed dressing at bedside.  Lucas catheter in place with active MD order.    Patient is resting in bed. Bed in low position, call light within reach. Hourly rounding in place.  Patient has no further complaints at this time, will continue to monitor.

## 2017-08-29 NOTE — CARE PLAN
Problem: Urinary Elimination:  Goal: Ability to reestablish a normal urinary elimination pattern will improve  Outcome: PROGRESSING AS EXPECTED  Pt voiding after davis d/c'd. L nephrostomy tube w/ bloody  Output. No clots noted

## 2017-09-01 LAB
APPEARANCE STONE: NORMAL
COMPN STONE: NORMAL
NUMBER STONE: NORMAL
SIZE STONE: NORMAL MM
SPECIMEN WT: 642 MG

## 2018-08-17 ENCOUNTER — APPOINTMENT (RX ONLY)
Dept: URBAN - NONMETROPOLITAN AREA CLINIC 1 | Facility: CLINIC | Age: 68
Setting detail: DERMATOLOGY
End: 2018-08-17

## 2018-08-17 DIAGNOSIS — L72.8 OTHER FOLLICULAR CYSTS OF THE SKIN AND SUBCUTANEOUS TISSUE: ICD-10-CM

## 2018-08-17 DIAGNOSIS — L82.1 OTHER SEBORRHEIC KERATOSIS: ICD-10-CM

## 2018-08-17 PROBLEM — E78.5 HYPERLIPIDEMIA, UNSPECIFIED: Status: ACTIVE | Noted: 2018-08-17

## 2018-08-17 PROBLEM — Z85.46 PERSONAL HISTORY OF MALIGNANT NEOPLASM OF PROSTATE: Status: ACTIVE | Noted: 2018-08-17

## 2018-08-17 PROBLEM — I10 ESSENTIAL (PRIMARY) HYPERTENSION: Status: ACTIVE | Noted: 2018-08-17

## 2018-08-17 PROCEDURE — 99203 OFFICE O/P NEW LOW 30 MIN: CPT

## 2018-08-17 PROCEDURE — ? COUNSELING

## 2018-08-17 ASSESSMENT — LOCATION DETAILED DESCRIPTION DERM
LOCATION DETAILED: LEFT DISTAL POSTERIOR UPPER ARM
LOCATION DETAILED: RIGHT CENTRAL LATERAL NECK
LOCATION DETAILED: RIGHT PROXIMAL POSTERIOR UPPER ARM

## 2018-08-17 ASSESSMENT — LOCATION SIMPLE DESCRIPTION DERM
LOCATION SIMPLE: LEFT UPPER ARM
LOCATION SIMPLE: NECK
LOCATION SIMPLE: RIGHT UPPER ARM

## 2018-08-17 ASSESSMENT — LOCATION ZONE DERM
LOCATION ZONE: ARM
LOCATION ZONE: NECK

## 2019-07-09 ENCOUNTER — APPOINTMENT (RX ONLY)
Dept: URBAN - NONMETROPOLITAN AREA CLINIC 1 | Facility: CLINIC | Age: 69
Setting detail: DERMATOLOGY
End: 2019-07-09

## 2019-07-09 DIAGNOSIS — L72.0 EPIDERMAL CYST: ICD-10-CM

## 2019-07-09 PROCEDURE — ? PUNCH EXCISION

## 2019-07-09 PROCEDURE — 11421 EXC H-F-NK-SP B9+MARG 0.6-1: CPT

## 2019-07-09 ASSESSMENT — LOCATION SIMPLE DESCRIPTION DERM: LOCATION SIMPLE: RIGHT ANTERIOR NECK

## 2019-07-09 ASSESSMENT — LOCATION DETAILED DESCRIPTION DERM: LOCATION DETAILED: RIGHT SUPERIOR LATERAL NECK

## 2019-07-09 ASSESSMENT — LOCATION ZONE DERM: LOCATION ZONE: NECK

## 2019-07-09 NOTE — PROCEDURE: PUNCH EXCISION
Wound Care: Polysporin ointment
Render Post-Care Instructions In Note?: yes
2 Mm Punch Excision Text: A 2 mm punch biopsy was used to make an initial incision over the lesion.  After this overlying column of skin was removed, blunt dissection was used to free the lesion from the surrounding tissues and the lesion was extirpated through the surgical opening made by the punch biopsy.
1.5 Mm Punch Excision Text: A 1.5 mm punch biopsy was used to make an initial incision over the lesion.  After this overlying column of skin was removed, blunt dissection was used to free the lesion from the surrounding tissues and the lesion was extirpated through the surgical opening made by the punch biopsy.
Size Of Margin In Cm: 0
Anesthesia Type: 1% lidocaine with epinephrine
8 Mm Punch Excision Text: A 8 mm punch biopsy was used to make an initial incision over the lesion.  After this overlying column of skin was removed, blunt dissection was used to free the lesion from the surrounding tissues and the lesion was extirpated through the surgical opening made by the punch biopsy.
2.5 Mm Punch Excision Text: A 2.5 mm punch biopsy was used to make an initial incision over the lesion.  After this overlying column of skin was removed, blunt dissection was used to free the lesion from the surrounding tissues and the lesion was extirpated through the surgical opening made by the punch biopsy.
Hemostasis: Electrocautery
10 Mm Punch Excision Text: A 10 mm punch biopsy was used to make an initial incision over the lesion.  After this overlying column of skin was removed, blunt dissection was used to free the lesion from the surrounding tissues and the lesion was extirpated through the surgical opening made by the punch biopsy.
Bill 10758 For Specimen Handling/Conveyance To Laboratory?: no
Anesthesia Volume In Cc: 6
Lab: [id number]
3 Mm Punch Excision Text: A 3 mm punch biopsy was used to make an initial incision over the lesion.  After this overlying column of skin was removed, blunt dissection was used to free the lesion from the surrounding tissues and the lesion was extirpated through the surgical opening made by the punch biopsy.
Detail Level: Detailed
Purse String (Intermediate) Text: Given the location of the defect and the characteristics of the surrounding skin a pursestring intermediate closure was deemed most appropriate.  Undermining was performed circumfirentially around the surgical defect.  A purstring suture was then placed and tightened.
Excision Method: 5 mm Punch
12 Mm Punch Excision Text: A 12 mm punch biopsy was used to make an initial incision over the lesion.  After this overlying column of skin was removed, blunt dissection was used to free the lesion from the surrounding tissues and the lesion was extirpated through the surgical opening made by the punch biopsy.
Lab Facility: [id number]
Post-Care Instructions: I reviewed with the patient in detail post-care instructions. Patient is not to engage in any heavy lifting, exercise, or swimming for the next 14 days. Should the patient develop any fevers, chills, bleeding, severe pain patient will contact the office immediately.
Dressing: dry sterile dressing
Estimated Blood Loss (Cc): minimal
Repair Type: None (Simple)
3.5 Mm Punch Excision Text: A 3.5 mm punch biopsy was used to make an initial incision over the lesion.  After this overlying column of skin was removed, blunt dissection was used to free the lesion from the surrounding tissues and the lesion was extirpated through the surgical opening made by the punch biopsy.
Excision Depth: adipose tissue
Anesthesia Type: 1% lidocaine with epinephrine and a 1:10 solution of 8.4% sodium bicarbonate
4 Mm Punch Excision Text: A 4 mm punch biopsy was used to make an initial incision over the lesion.  After this overlying column of skin was removed, blunt dissection was used to free the lesion from the surrounding tissues and the lesion was extirpated through the surgical opening made by the punch biopsy.
Billing Type: Third-Party Bill
5 Mm Punch Excision Text: A 5 mm punch biopsy was used to make an initial incision over the lesion.  After this overlying column of skin was removed, blunt dissection was used to free the lesion from the surrounding tissues and the lesion was extirpated through the surgical opening made by the punch biopsy.
4.5 Mm Punch Excision Text: A 4.5 mm punch biopsy was used to make an initial incision over the lesion.  After this overlying column of skin was removed, blunt dissection was used to free the lesion from the surrounding tissues and the lesion was extirpated through the surgical opening made by the punch biopsy.
Epidermal Closure: simple interrupted
6 Mm Punch Excision Text: A 6 mm punch biopsy was used to make an initial incision over the lesion.  After this overlying column of skin was removed, blunt dissection was used to free the lesion from the surrounding tissues and the lesion was extirpated through the surgical opening made by the punch biopsy.
Size Of Lesion In Cm: 1
Suture Removal: 14 days
Medical Necessity Clause: The excision was medically necessary because the lesion which was excised was
Complex Repair Preamble Text (Leave Blank If You Do Not Want): Extensive wide undermining was performed.
X Size Of Lesion In Cm (Optional): 0.6
7 Mm Punch Excision Text: A 7 mm punch biopsy was used to make an initial incision over the lesion.  After this overlying column of skin was removed, blunt dissection was used to free the lesion from the surrounding tissues and the lesion was extirpated through the surgical opening made by the punch biopsy.
Consent was obtained from the patient. The risks and benefits to therapy were discussed in detail. Specifically, the risks of infection, scarring, bleeding, prolonged wound healing, incomplete removal, allergy to anesthesia, nerve injury and recurrence were addressed. Prior to the procedure, the treatment site was clearly identified and confirmed by the patient. All components of Universal Protocol/PAUSE Rule completed.
Epidermal Sutures: 4-0 Surgipro
Intermediate Repair Preamble Text (Leave Blank If You Do Not Want): Undermining was performed with blunt dissection.
Medical Necessity Clause: This procedure was medically necessary because the lesion that was treated was:

## 2019-07-23 ENCOUNTER — APPOINTMENT (RX ONLY)
Dept: URBAN - NONMETROPOLITAN AREA CLINIC 1 | Facility: CLINIC | Age: 69
Setting detail: DERMATOLOGY
End: 2019-07-23

## 2019-07-23 DIAGNOSIS — L82.1 OTHER SEBORRHEIC KERATOSIS: ICD-10-CM

## 2019-07-23 DIAGNOSIS — Z48.02 ENCOUNTER FOR REMOVAL OF SUTURES: ICD-10-CM

## 2019-07-23 DIAGNOSIS — Z12.83 ENCOUNTER FOR SCREENING FOR MALIGNANT NEOPLASM OF SKIN: ICD-10-CM

## 2019-07-23 PROCEDURE — ? SUTURE REMOVAL (GLOBAL PERIOD)

## 2019-07-23 PROCEDURE — ? COUNSELING

## 2019-07-23 PROCEDURE — 99214 OFFICE O/P EST MOD 30 MIN: CPT

## 2019-07-23 ASSESSMENT — LOCATION DETAILED DESCRIPTION DERM
LOCATION DETAILED: RIGHT SUPERIOR LATERAL NECK
LOCATION DETAILED: RIGHT SUPERIOR UPPER BACK
LOCATION DETAILED: LEFT MEDIAL SUPERIOR CHEST
LOCATION DETAILED: RIGHT SUPERIOR MEDIAL UPPER BACK

## 2019-07-23 ASSESSMENT — LOCATION SIMPLE DESCRIPTION DERM
LOCATION SIMPLE: RIGHT ANTERIOR NECK
LOCATION SIMPLE: CHEST
LOCATION SIMPLE: RIGHT UPPER BACK

## 2019-07-23 ASSESSMENT — LOCATION ZONE DERM
LOCATION ZONE: NECK
LOCATION ZONE: TRUNK

## 2019-07-23 NOTE — PROCEDURE: SUTURE REMOVAL (GLOBAL PERIOD)
Add 12639 Cpt? (Important Note: In 2017 The Use Of 42756 Is Being Tracked By Cms To Determine Future Global Period Reimbursement For Global Periods): yes
Detail Level: Simple

## 2021-12-01 ENCOUNTER — APPOINTMENT (RX ONLY)
Dept: URBAN - NONMETROPOLITAN AREA CLINIC 1 | Facility: CLINIC | Age: 71
Setting detail: DERMATOLOGY
End: 2021-12-01

## 2021-12-01 DIAGNOSIS — R21 RASH AND OTHER NONSPECIFIC SKIN ERUPTION: ICD-10-CM

## 2021-12-01 DIAGNOSIS — Z12.83 ENCOUNTER FOR SCREENING FOR MALIGNANT NEOPLASM OF SKIN: ICD-10-CM

## 2021-12-01 DIAGNOSIS — L81.4 OTHER MELANIN HYPERPIGMENTATION: ICD-10-CM

## 2021-12-01 DIAGNOSIS — L82.1 OTHER SEBORRHEIC KERATOSIS: ICD-10-CM

## 2021-12-01 PROCEDURE — ? PRESCRIPTION

## 2021-12-01 PROCEDURE — 99213 OFFICE O/P EST LOW 20 MIN: CPT

## 2021-12-01 PROCEDURE — ? COUNSELING

## 2021-12-01 RX ORDER — TRIAMCINOLONE ACETONIDE 1 MG/G
CREAM TOPICAL BID
Qty: 80 | Refills: 3 | Status: ERX | COMMUNITY
Start: 2021-12-01

## 2021-12-01 RX ADMIN — TRIAMCINOLONE ACETONIDE: 1 CREAM TOPICAL at 00:00

## 2021-12-01 ASSESSMENT — LOCATION SIMPLE DESCRIPTION DERM
LOCATION SIMPLE: LEFT CHEEK
LOCATION SIMPLE: CHEST
LOCATION SIMPLE: POSTERIOR NECK
LOCATION SIMPLE: RIGHT UPPER BACK

## 2021-12-01 ASSESSMENT — LOCATION DETAILED DESCRIPTION DERM
LOCATION DETAILED: STERNUM
LOCATION DETAILED: MID POSTERIOR NECK
LOCATION DETAILED: LEFT INFERIOR CENTRAL MALAR CHEEK
LOCATION DETAILED: RIGHT SUPERIOR MEDIAL UPPER BACK

## 2021-12-01 ASSESSMENT — LOCATION ZONE DERM
LOCATION ZONE: FACE
LOCATION ZONE: TRUNK
LOCATION ZONE: NECK

## 2021-12-01 NOTE — PROCEDURE: MIPS QUALITY
Quality 111:Pneumonia Vaccination Status For Older Adults: Pneumococcal Vaccination Previously Received
Detail Level: Generalized
Quality 226: Preventive Care And Screening: Tobacco Use: Screening And Cessation Intervention: Patient screened for tobacco use and is an ex/non-smoker
Quality 130: Documentation Of Current Medications In The Medical Record: Current Medications Documented

## 2021-12-01 NOTE — HPI: RASH
How Severe Is Your Rash?: moderate
Is This A New Presentation, Or A Follow-Up?: Rash
Additional History: Patient had a rash several months ago that has now resolved.  Triamcinolone helped.  He wants a refill in case of recurrence.

## 2023-02-21 ENCOUNTER — APPOINTMENT (OUTPATIENT)
Dept: RADIOLOGY | Facility: MEDICAL CENTER | Age: 73
DRG: 871 | End: 2023-02-21
Attending: EMERGENCY MEDICINE
Payer: MEDICARE

## 2023-02-21 ENCOUNTER — HOSPITAL ENCOUNTER (INPATIENT)
Facility: MEDICAL CENTER | Age: 73
LOS: 8 days | DRG: 871 | End: 2023-03-01
Attending: EMERGENCY MEDICINE | Admitting: INTERNAL MEDICINE
Payer: MEDICARE

## 2023-02-21 DIAGNOSIS — K92.2 GASTROINTESTINAL HEMORRHAGE, UNSPECIFIED GASTROINTESTINAL HEMORRHAGE TYPE: ICD-10-CM

## 2023-02-21 DIAGNOSIS — R41.82 ALTERED MENTAL STATUS, UNSPECIFIED ALTERED MENTAL STATUS TYPE: ICD-10-CM

## 2023-02-21 DIAGNOSIS — A41.9 SEPSIS, DUE TO UNSPECIFIED ORGANISM, UNSPECIFIED WHETHER ACUTE ORGAN DYSFUNCTION PRESENT (HCC): ICD-10-CM

## 2023-02-21 DIAGNOSIS — D62 ABLA (ACUTE BLOOD LOSS ANEMIA): ICD-10-CM

## 2023-02-21 DIAGNOSIS — C25.9 MALIGNANT NEOPLASM OF PANCREAS, UNSPECIFIED LOCATION OF MALIGNANCY (HCC): ICD-10-CM

## 2023-02-21 DIAGNOSIS — K81.0 EMPHYSEMATOUS CHOLECYSTITIS: ICD-10-CM

## 2023-02-21 PROBLEM — E87.6 HYPOKALEMIA: Status: ACTIVE | Noted: 2023-02-21

## 2023-02-21 PROBLEM — C78.7 PANCREATIC CANCER METASTASIZED TO LIVER (HCC): Status: ACTIVE | Noted: 2023-02-21

## 2023-02-21 PROBLEM — Z71.89 GOALS OF CARE, COUNSELING/DISCUSSION: Status: ACTIVE | Noted: 2023-02-21

## 2023-02-21 PROBLEM — R74.01 TRANSAMINITIS: Status: ACTIVE | Noted: 2023-02-21

## 2023-02-21 PROBLEM — G93.40 ACUTE ENCEPHALOPATHY: Status: ACTIVE | Noted: 2023-02-21

## 2023-02-21 PROBLEM — J96.01 ACUTE RESPIRATORY FAILURE WITH HYPOXIA (HCC): Status: ACTIVE | Noted: 2023-02-21

## 2023-02-21 PROBLEM — R65.21 SEPTIC SHOCK (HCC): Status: ACTIVE | Noted: 2023-02-21

## 2023-02-21 PROBLEM — R74.8 ELEVATED ALKALINE PHOSPHATASE LEVEL: Status: ACTIVE | Noted: 2023-02-21

## 2023-02-21 LAB
ABO + RH BLD: NORMAL
ABO GROUP BLD: NORMAL
ALBUMIN SERPL BCP-MCNC: 2.4 G/DL (ref 3.2–4.9)
ALBUMIN/GLOB SERPL: 0.8 G/DL
ALP SERPL-CCNC: 520 U/L (ref 30–99)
ALT SERPL-CCNC: 61 U/L (ref 2–50)
ANION GAP SERPL CALC-SCNC: 13 MMOL/L (ref 7–16)
APPEARANCE UR: CLEAR
APPEARANCE UR: CLEAR
APTT PPP: 26.3 SEC (ref 24.7–36)
AST SERPL-CCNC: 54 U/L (ref 12–45)
BARCODED ABORH UBTYP: 1700
BARCODED ABORH UBTYP: 1700
BARCODED ABORH UBTYP: 2800
BARCODED ABORH UBTYP: 2800
BARCODED ABORH UBTYP: 600
BARCODED PRD CODE UBPRD: NORMAL
BARCODED UNIT NUM UBUNT: NORMAL
BASOPHILS # BLD AUTO: 0.2 % (ref 0–1.8)
BASOPHILS # BLD: 0.01 K/UL (ref 0–0.12)
BILIRUB SERPL-MCNC: 3 MG/DL (ref 0.1–1.5)
BILIRUB UR QL STRIP.AUTO: ABNORMAL
BILIRUB UR QL STRIP.AUTO: ABNORMAL
BLD GP AB SCN SERPL QL: NORMAL
BUN SERPL-MCNC: 58 MG/DL (ref 8–22)
CALCIUM ALBUM COR SERPL-MCNC: 9.6 MG/DL (ref 8.5–10.5)
CALCIUM SERPL-MCNC: 8.3 MG/DL (ref 8.5–10.5)
CHLORIDE SERPL-SCNC: 105 MMOL/L (ref 96–112)
CO2 SERPL-SCNC: 18 MMOL/L (ref 20–33)
COLOR UR: ABNORMAL
COLOR UR: ABNORMAL
COMPONENT R 8504R: NORMAL
CORTIS SERPL-MCNC: 40.2 UG/DL (ref 0–23)
CREAT SERPL-MCNC: 0.93 MG/DL (ref 0.5–1.4)
EOSINOPHIL # BLD AUTO: 0 K/UL (ref 0–0.51)
EOSINOPHIL NFR BLD: 0 % (ref 0–6.9)
ERYTHROCYTE [DISTWIDTH] IN BLOOD BY AUTOMATED COUNT: 51.6 FL (ref 35.9–50)
GFR SERPLBLD CREATININE-BSD FMLA CKD-EPI: 87 ML/MIN/1.73 M 2
GLOBULIN SER CALC-MCNC: 2.9 G/DL (ref 1.9–3.5)
GLUCOSE SERPL-MCNC: 134 MG/DL (ref 65–99)
GLUCOSE UR STRIP.AUTO-MCNC: NEGATIVE MG/DL
GLUCOSE UR STRIP.AUTO-MCNC: NEGATIVE MG/DL
HCT VFR BLD AUTO: 10.4 % (ref 42–52)
HGB BLD-MCNC: 3.4 G/DL (ref 14–18)
HGB BLD-MCNC: 5.5 G/DL (ref 14–18)
IMM GRANULOCYTES # BLD AUTO: 0.08 K/UL (ref 0–0.11)
IMM GRANULOCYTES NFR BLD AUTO: 1.4 % (ref 0–0.9)
INR PPP: 1.55 (ref 0.87–1.13)
KETONES UR STRIP.AUTO-MCNC: NEGATIVE MG/DL
KETONES UR STRIP.AUTO-MCNC: NEGATIVE MG/DL
LACTATE SERPL-SCNC: 2.4 MMOL/L (ref 0.5–2)
LACTATE SERPL-SCNC: 3.8 MMOL/L (ref 0.5–2)
LACTATE SERPL-SCNC: 5.1 MMOL/L (ref 0.5–2)
LEUKOCYTE ESTERASE UR QL STRIP.AUTO: NEGATIVE
LEUKOCYTE ESTERASE UR QL STRIP.AUTO: NEGATIVE
LYMPHOCYTES # BLD AUTO: 0.36 K/UL (ref 1–4.8)
LYMPHOCYTES NFR BLD: 6.2 % (ref 22–41)
MAGNESIUM SERPL-MCNC: 2.4 MG/DL (ref 1.5–2.5)
MCH RBC QN AUTO: 27.4 PG (ref 27–33)
MCHC RBC AUTO-ENTMCNC: 32.7 G/DL (ref 33.7–35.3)
MCV RBC AUTO: 83.9 FL (ref 81.4–97.8)
MICRO URNS: ABNORMAL
MICRO URNS: ABNORMAL
MONOCYTES # BLD AUTO: 0.19 K/UL (ref 0–0.85)
MONOCYTES NFR BLD AUTO: 3.3 % (ref 0–13.4)
NEUTROPHILS # BLD AUTO: 5.17 K/UL (ref 1.82–7.42)
NEUTROPHILS NFR BLD: 88.9 % (ref 44–72)
NITRITE UR QL STRIP.AUTO: NEGATIVE
NITRITE UR QL STRIP.AUTO: NEGATIVE
NRBC # BLD AUTO: 0.04 K/UL
NRBC BLD-RTO: 0.7 /100 WBC
PH UR STRIP.AUTO: 5 [PH] (ref 5–8)
PH UR STRIP.AUTO: 5 [PH] (ref 5–8)
PLATELET # BLD AUTO: 122 K/UL (ref 164–446)
PMV BLD AUTO: 10.2 FL (ref 9–12.9)
POTASSIUM SERPL-SCNC: 3.5 MMOL/L (ref 3.6–5.5)
PROCALCITONIN SERPL-MCNC: 15 NG/ML
PRODUCT TYPE UPROD: NORMAL
PROT SERPL-MCNC: 5.3 G/DL (ref 6–8.2)
PROT UR QL STRIP: NEGATIVE MG/DL
PROT UR QL STRIP: NEGATIVE MG/DL
PROTHROMBIN TIME: 18.2 SEC (ref 12–14.6)
RBC # BLD AUTO: 1.24 M/UL (ref 4.7–6.1)
RBC UR QL AUTO: NEGATIVE
RBC UR QL AUTO: NEGATIVE
RH BLD: NORMAL
SCCMEC + MECA PNL NOSE NAA+PROBE: NEGATIVE
SODIUM SERPL-SCNC: 136 MMOL/L (ref 135–145)
SP GR UR STRIP.AUTO: 1.01
SP GR UR STRIP.AUTO: 1.01
UNIT STATUS USTAT: NORMAL
UROBILINOGEN UR STRIP.AUTO-MCNC: 0.2 MG/DL
UROBILINOGEN UR STRIP.AUTO-MCNC: 0.2 MG/DL
WBC # BLD AUTO: 5.8 K/UL (ref 4.8–10.8)

## 2023-02-21 PROCEDURE — 87186 SC STD MICRODIL/AGAR DIL: CPT

## 2023-02-21 PROCEDURE — 86901 BLOOD TYPING SEROLOGIC RH(D): CPT

## 2023-02-21 PROCEDURE — 99291 CRITICAL CARE FIRST HOUR: CPT

## 2023-02-21 PROCEDURE — 83605 ASSAY OF LACTIC ACID: CPT

## 2023-02-21 PROCEDURE — 99292 CRITICAL CARE ADDL 30 MIN: CPT | Performed by: INTERNAL MEDICINE

## 2023-02-21 PROCEDURE — 770022 HCHG ROOM/CARE - ICU (200)

## 2023-02-21 PROCEDURE — 81003 URINALYSIS AUTO W/O SCOPE: CPT

## 2023-02-21 PROCEDURE — 87077 CULTURE AEROBIC IDENTIFY: CPT

## 2023-02-21 PROCEDURE — 83735 ASSAY OF MAGNESIUM: CPT

## 2023-02-21 PROCEDURE — 86900 BLOOD TYPING SEROLOGIC ABO: CPT

## 2023-02-21 PROCEDURE — 96375 TX/PRO/DX INJ NEW DRUG ADDON: CPT

## 2023-02-21 PROCEDURE — 82533 TOTAL CORTISOL: CPT

## 2023-02-21 PROCEDURE — 85610 PROTHROMBIN TIME: CPT

## 2023-02-21 PROCEDURE — 86923 COMPATIBILITY TEST ELECTRIC: CPT

## 2023-02-21 PROCEDURE — 96366 THER/PROPH/DIAG IV INF ADDON: CPT

## 2023-02-21 PROCEDURE — 71045 X-RAY EXAM CHEST 1 VIEW: CPT

## 2023-02-21 PROCEDURE — 87040 BLOOD CULTURE FOR BACTERIA: CPT | Mod: 91

## 2023-02-21 PROCEDURE — 84145 PROCALCITONIN (PCT): CPT

## 2023-02-21 PROCEDURE — 99291 CRITICAL CARE FIRST HOUR: CPT | Performed by: INTERNAL MEDICINE

## 2023-02-21 PROCEDURE — 86850 RBC ANTIBODY SCREEN: CPT

## 2023-02-21 PROCEDURE — 36430 TRANSFUSION BLD/BLD COMPNT: CPT

## 2023-02-21 PROCEDURE — 700105 HCHG RX REV CODE 258: Performed by: INTERNAL MEDICINE

## 2023-02-21 PROCEDURE — 96367 TX/PROPH/DG ADDL SEQ IV INF: CPT

## 2023-02-21 PROCEDURE — 87086 URINE CULTURE/COLONY COUNT: CPT

## 2023-02-21 PROCEDURE — 96365 THER/PROPH/DIAG IV INF INIT: CPT

## 2023-02-21 PROCEDURE — 85018 HEMOGLOBIN: CPT

## 2023-02-21 PROCEDURE — C9113 INJ PANTOPRAZOLE SODIUM, VIA: HCPCS | Performed by: EMERGENCY MEDICINE

## 2023-02-21 PROCEDURE — 80053 COMPREHEN METABOLIC PANEL: CPT

## 2023-02-21 PROCEDURE — 85730 THROMBOPLASTIN TIME PARTIAL: CPT

## 2023-02-21 PROCEDURE — 700111 HCHG RX REV CODE 636 W/ 250 OVERRIDE (IP): Performed by: INTERNAL MEDICINE

## 2023-02-21 PROCEDURE — P9016 RBC LEUKOCYTES REDUCED: HCPCS

## 2023-02-21 PROCEDURE — 30233N1 TRANSFUSION OF NONAUTOLOGOUS RED BLOOD CELLS INTO PERIPHERAL VEIN, PERCUTANEOUS APPROACH: ICD-10-PCS | Performed by: NURSE PRACTITIONER

## 2023-02-21 PROCEDURE — 700111 HCHG RX REV CODE 636 W/ 250 OVERRIDE (IP): Performed by: EMERGENCY MEDICINE

## 2023-02-21 PROCEDURE — 700105 HCHG RX REV CODE 258: Performed by: EMERGENCY MEDICINE

## 2023-02-21 PROCEDURE — 85025 COMPLETE CBC W/AUTO DIFF WBC: CPT

## 2023-02-21 PROCEDURE — 36415 COLL VENOUS BLD VENIPUNCTURE: CPT

## 2023-02-21 PROCEDURE — 87641 MR-STAPH DNA AMP PROBE: CPT

## 2023-02-21 PROCEDURE — C9113 INJ PANTOPRAZOLE SODIUM, VIA: HCPCS | Performed by: INTERNAL MEDICINE

## 2023-02-21 RX ORDER — OCTREOTIDE ACETATE 100 UG/ML
50 INJECTION, SOLUTION INTRAVENOUS; SUBCUTANEOUS ONCE
Status: DISCONTINUED | OUTPATIENT
Start: 2023-02-21 | End: 2023-02-21

## 2023-02-21 RX ORDER — ONDANSETRON 2 MG/ML
4 INJECTION INTRAMUSCULAR; INTRAVENOUS EVERY 4 HOURS PRN
Status: DISCONTINUED | OUTPATIENT
Start: 2023-02-21 | End: 2023-03-01 | Stop reason: HOSPADM

## 2023-02-21 RX ORDER — SODIUM CHLORIDE 9 MG/ML
INJECTION, SOLUTION INTRAVENOUS CONTINUOUS
Status: ACTIVE | OUTPATIENT
Start: 2023-02-22 | End: 2023-02-22

## 2023-02-21 RX ORDER — HYDROMORPHONE HYDROCHLORIDE 1 MG/ML
0.5 INJECTION, SOLUTION INTRAMUSCULAR; INTRAVENOUS; SUBCUTANEOUS ONCE
Status: COMPLETED | OUTPATIENT
Start: 2023-02-21 | End: 2023-02-21

## 2023-02-21 RX ORDER — SODIUM CHLORIDE, SODIUM LACTATE, POTASSIUM CHLORIDE, AND CALCIUM CHLORIDE .6; .31; .03; .02 G/100ML; G/100ML; G/100ML; G/100ML
30 INJECTION, SOLUTION INTRAVENOUS ONCE
Status: COMPLETED | OUTPATIENT
Start: 2023-02-21 | End: 2023-02-22

## 2023-02-21 RX ORDER — SODIUM CHLORIDE, SODIUM LACTATE, POTASSIUM CHLORIDE, AND CALCIUM CHLORIDE .6; .31; .03; .02 G/100ML; G/100ML; G/100ML; G/100ML
500 INJECTION, SOLUTION INTRAVENOUS
Status: DISCONTINUED | OUTPATIENT
Start: 2023-02-21 | End: 2023-03-01 | Stop reason: HOSPADM

## 2023-02-21 RX ORDER — OXYCODONE HYDROCHLORIDE 10 MG/1
10 TABLET ORAL EVERY 4 HOURS PRN
COMMUNITY
Start: 2022-12-15

## 2023-02-21 RX ORDER — POTASSIUM CHLORIDE 29.8 MG/ML
40 INJECTION INTRAVENOUS ONCE
Status: COMPLETED | OUTPATIENT
Start: 2023-02-21 | End: 2023-02-22

## 2023-02-21 RX ORDER — NOREPINEPHRINE BITARTRATE 0.03 MG/ML
0-1 INJECTION, SOLUTION INTRAVENOUS CONTINUOUS
Status: DISCONTINUED | OUTPATIENT
Start: 2023-02-21 | End: 2023-02-24

## 2023-02-21 RX ORDER — NOREPINEPHRINE BITARTRATE 0.03 MG/ML
0-1 INJECTION, SOLUTION INTRAVENOUS CONTINUOUS
Status: DISCONTINUED | OUTPATIENT
Start: 2023-02-21 | End: 2023-02-21

## 2023-02-21 RX ORDER — OCTREOTIDE ACETATE 100 UG/ML
50 INJECTION, SOLUTION INTRAVENOUS; SUBCUTANEOUS ONCE
Status: COMPLETED | OUTPATIENT
Start: 2023-02-21 | End: 2023-02-21

## 2023-02-21 RX ORDER — HYDROMORPHONE HYDROCHLORIDE 1 MG/ML
0.5 INJECTION, SOLUTION INTRAMUSCULAR; INTRAVENOUS; SUBCUTANEOUS
Status: DISCONTINUED | OUTPATIENT
Start: 2023-02-21 | End: 2023-03-01 | Stop reason: HOSPADM

## 2023-02-21 RX ORDER — SODIUM CHLORIDE, SODIUM LACTATE, POTASSIUM CHLORIDE, CALCIUM CHLORIDE 600; 310; 30; 20 MG/100ML; MG/100ML; MG/100ML; MG/100ML
INJECTION, SOLUTION INTRAVENOUS CONTINUOUS
Status: DISCONTINUED | OUTPATIENT
Start: 2023-02-21 | End: 2023-02-24

## 2023-02-21 RX ORDER — ONDANSETRON HYDROCHLORIDE 8 MG/1
8 TABLET, FILM COATED ORAL EVERY 8 HOURS PRN
COMMUNITY
Start: 2023-02-16 | End: 2024-02-11

## 2023-02-21 RX ORDER — ONDANSETRON 4 MG/1
4 TABLET, ORALLY DISINTEGRATING ORAL EVERY 4 HOURS PRN
Status: DISCONTINUED | OUTPATIENT
Start: 2023-02-21 | End: 2023-03-01 | Stop reason: HOSPADM

## 2023-02-21 RX ORDER — CALCIUM CARBONATE 500 MG/1
500 TABLET, CHEWABLE ORAL PRN
COMMUNITY

## 2023-02-21 RX ADMIN — SODIUM CHLORIDE, POTASSIUM CHLORIDE, SODIUM LACTATE AND CALCIUM CHLORIDE 2394 ML: 600; 310; 30; 20 INJECTION, SOLUTION INTRAVENOUS at 17:44

## 2023-02-21 RX ADMIN — VANCOMYCIN HYDROCHLORIDE 2000 MG: 500 INJECTION, POWDER, LYOPHILIZED, FOR SOLUTION INTRAVENOUS at 18:19

## 2023-02-21 RX ADMIN — HYDROCORTISONE SODIUM SUCCINATE 100 MG: 100 INJECTION, POWDER, FOR SOLUTION INTRAMUSCULAR; INTRAVENOUS at 22:26

## 2023-02-21 RX ADMIN — PIPERACILLIN AND TAZOBACTAM 4.5 G: 4; .5 INJECTION, POWDER, LYOPHILIZED, FOR SOLUTION INTRAVENOUS; PARENTERAL at 22:53

## 2023-02-21 RX ADMIN — HYDROMORPHONE HYDROCHLORIDE 0.5 MG: 1 INJECTION, SOLUTION INTRAMUSCULAR; INTRAVENOUS; SUBCUTANEOUS at 19:45

## 2023-02-21 RX ADMIN — POTASSIUM CHLORIDE 40 MEQ: 29.8 INJECTION, SOLUTION INTRAVENOUS at 22:15

## 2023-02-21 RX ADMIN — OCTREOTIDE ACETATE 50 MCG: 100 INJECTION, SOLUTION INTRAVENOUS; SUBCUTANEOUS at 23:07

## 2023-02-21 RX ADMIN — MEROPENEM 500 MG: 500 INJECTION, POWDER, FOR SOLUTION INTRAVENOUS at 17:40

## 2023-02-21 RX ADMIN — PANTOPRAZOLE SODIUM 80 MG: 40 INJECTION, POWDER, FOR SOLUTION INTRAVENOUS at 19:52

## 2023-02-21 RX ADMIN — SODIUM CHLORIDE, POTASSIUM CHLORIDE, SODIUM LACTATE AND CALCIUM CHLORIDE: 600; 310; 30; 20 INJECTION, SOLUTION INTRAVENOUS at 22:04

## 2023-02-21 RX ADMIN — PANTOPRAZOLE SODIUM 8 MG/HR: 40 INJECTION, POWDER, LYOPHILIZED, FOR SOLUTION INTRAVENOUS at 22:55

## 2023-02-21 RX ADMIN — OCTREOTIDE ACETATE 50 MCG/HR: 200 INJECTION, SOLUTION INTRAVENOUS; SUBCUTANEOUS at 22:00

## 2023-02-21 ASSESSMENT — PATIENT HEALTH QUESTIONNAIRE - PHQ9
7. TROUBLE CONCENTRATING ON THINGS, SUCH AS READING THE NEWSPAPER OR WATCHING TELEVISION: SEVERAL DAYS
9. THOUGHTS THAT YOU WOULD BE BETTER OFF DEAD, OR OF HURTING YOURSELF: NOT AT ALL
3. TROUBLE FALLING OR STAYING ASLEEP OR SLEEPING TOO MUCH: SEVERAL DAYS
5. POOR APPETITE OR OVEREATING: SEVERAL DAYS
8. MOVING OR SPEAKING SO SLOWLY THAT OTHER PEOPLE COULD HAVE NOTICED. OR THE OPPOSITE, BEING SO FIGETY OR RESTLESS THAT YOU HAVE BEEN MOVING AROUND A LOT MORE THAN USUAL: SEVERAL DAYS
4. FEELING TIRED OR HAVING LITTLE ENERGY: SEVERAL DAYS
6. FEELING BAD ABOUT YOURSELF - OR THAT YOU ARE A FAILURE OR HAVE LET YOURSELF OR YOUR FAMILY DOWN: SEVERAL DAYS
2. FEELING DOWN, DEPRESSED, IRRITABLE, OR HOPELESS: SEVERAL DAYS
SUM OF ALL RESPONSES TO PHQ QUESTIONS 1-9: 8
SUM OF ALL RESPONSES TO PHQ9 QUESTIONS 1 AND 2: 2
1. LITTLE INTEREST OR PLEASURE IN DOING THINGS: SEVERAL DAYS

## 2023-02-21 ASSESSMENT — ENCOUNTER SYMPTOMS
ABDOMINAL PAIN: 1
VOMITING: 1
NAUSEA: 1
CHILLS: 1
FEVER: 1

## 2023-02-21 ASSESSMENT — LIFESTYLE VARIABLES
TOTAL SCORE: 0
HOW MANY TIMES IN THE PAST YEAR HAVE YOU HAD 5 OR MORE DRINKS IN A DAY: 0
HAVE YOU EVER FELT YOU SHOULD CUT DOWN ON YOUR DRINKING: NO
HAVE PEOPLE ANNOYED YOU BY CRITICIZING YOUR DRINKING: NO
DOES PATIENT WANT TO STOP DRINKING: NO
AVERAGE NUMBER OF DAYS PER WEEK YOU HAVE A DRINK CONTAINING ALCOHOL: 0
ON A TYPICAL DAY WHEN YOU DRINK ALCOHOL HOW MANY DRINKS DO YOU HAVE: 0
EVER FELT BAD OR GUILTY ABOUT YOUR DRINKING: NO
CONSUMPTION TOTAL: NEGATIVE
ALCOHOL_USE: NO
TOTAL SCORE: 0
EVER HAD A DRINK FIRST THING IN THE MORNING TO STEADY YOUR NERVES TO GET RID OF A HANGOVER: NO
TOTAL SCORE: 0

## 2023-02-21 ASSESSMENT — COGNITIVE AND FUNCTIONAL STATUS - GENERAL
WALKING IN HOSPITAL ROOM: A LOT
SUGGESTED CMS G CODE MODIFIER DAILY ACTIVITY: CK
DRESSING REGULAR UPPER BODY CLOTHING: A LOT
MOVING FROM LYING ON BACK TO SITTING ON SIDE OF FLAT BED: A LITTLE
EATING MEALS: A LITTLE
TOILETING: A LOT
STANDING UP FROM CHAIR USING ARMS: A LITTLE
MOBILITY SCORE: 19
DRESSING REGULAR LOWER BODY CLOTHING: A LOT
DAILY ACTIVITIY SCORE: 14
PERSONAL GROOMING: A LITTLE
MOVING TO AND FROM BED TO CHAIR: A LITTLE
HELP NEEDED FOR BATHING: A LOT
SUGGESTED CMS G CODE MODIFIER MOBILITY: CK

## 2023-02-21 ASSESSMENT — PAIN DESCRIPTION - PAIN TYPE: TYPE: ACUTE PAIN;CHRONIC PAIN

## 2023-02-21 ASSESSMENT — FIBROSIS 4 INDEX: FIB4 SCORE: 4.02

## 2023-02-22 ENCOUNTER — APPOINTMENT (OUTPATIENT)
Dept: RADIOLOGY | Facility: MEDICAL CENTER | Age: 73
DRG: 871 | End: 2023-02-22
Attending: EMERGENCY MEDICINE
Payer: MEDICARE

## 2023-02-22 PROBLEM — R57.8 HEMORRHAGIC SHOCK (HCC): Status: ACTIVE | Noted: 2023-02-22

## 2023-02-22 PROBLEM — K81.0 EMPHYSEMATOUS CHOLECYSTITIS: Status: ACTIVE | Noted: 2023-02-22

## 2023-02-22 LAB
ALBUMIN SERPL BCP-MCNC: 2.1 G/DL (ref 3.2–4.9)
ALBUMIN/GLOB SERPL: 0.7 G/DL
ALP SERPL-CCNC: 486 U/L (ref 30–99)
ALT SERPL-CCNC: 50 U/L (ref 2–50)
ANION GAP SERPL CALC-SCNC: 11 MMOL/L (ref 7–16)
AST SERPL-CCNC: 44 U/L (ref 12–45)
BASOPHILS # BLD AUTO: 0.1 % (ref 0–1.8)
BASOPHILS # BLD AUTO: 0.1 % (ref 0–1.8)
BASOPHILS # BLD AUTO: 0.2 % (ref 0–1.8)
BASOPHILS # BLD: 0.01 K/UL (ref 0–0.12)
BASOPHILS # BLD: 0.01 K/UL (ref 0–0.12)
BASOPHILS # BLD: 0.02 K/UL (ref 0–0.12)
BILIRUB SERPL-MCNC: 2.7 MG/DL (ref 0.1–1.5)
BUN SERPL-MCNC: 50 MG/DL (ref 8–22)
CALCIUM ALBUM COR SERPL-MCNC: 9.6 MG/DL (ref 8.5–10.5)
CALCIUM SERPL-MCNC: 8.1 MG/DL (ref 8.5–10.5)
CFT BLD TEG: 5.2 MIN (ref 4.6–9.1)
CFT P HPASE BLD TEG: 5.5 MIN (ref 4.3–8.3)
CHLORIDE SERPL-SCNC: 109 MMOL/L (ref 96–112)
CLOT ANGLE BLD TEG: 77.3 DEGREES (ref 63–78)
CLOT LYSIS 30M P MA LENFR BLD TEG: 0 % (ref 0–2.6)
CO2 SERPL-SCNC: 18 MMOL/L (ref 20–33)
CREAT SERPL-MCNC: 0.77 MG/DL (ref 0.5–1.4)
CT.EXTRINSIC BLD ROTEM: 0.9 MIN (ref 0.8–2.1)
EOSINOPHIL # BLD AUTO: 0 K/UL (ref 0–0.51)
EOSINOPHIL # BLD AUTO: 0 K/UL (ref 0–0.51)
EOSINOPHIL # BLD AUTO: 0.02 K/UL (ref 0–0.51)
EOSINOPHIL NFR BLD: 0 % (ref 0–6.9)
EOSINOPHIL NFR BLD: 0 % (ref 0–6.9)
EOSINOPHIL NFR BLD: 0.2 % (ref 0–6.9)
ERYTHROCYTE [DISTWIDTH] IN BLOOD BY AUTOMATED COUNT: 48.4 FL (ref 35.9–50)
ERYTHROCYTE [DISTWIDTH] IN BLOOD BY AUTOMATED COUNT: 50.4 FL (ref 35.9–50)
ERYTHROCYTE [DISTWIDTH] IN BLOOD BY AUTOMATED COUNT: 50.5 FL (ref 35.9–50)
GFR SERPLBLD CREATININE-BSD FMLA CKD-EPI: 95 ML/MIN/1.73 M 2
GLOBULIN SER CALC-MCNC: 3.1 G/DL (ref 1.9–3.5)
GLUCOSE SERPL-MCNC: 153 MG/DL (ref 65–99)
HCT VFR BLD AUTO: 21.2 % (ref 42–52)
HCT VFR BLD AUTO: 24.2 % (ref 42–52)
HCT VFR BLD AUTO: 27 % (ref 42–52)
HGB BLD-MCNC: 7.1 G/DL (ref 14–18)
HGB BLD-MCNC: 8.5 G/DL (ref 14–18)
HGB BLD-MCNC: 9 G/DL (ref 14–18)
IMM GRANULOCYTES # BLD AUTO: 0.06 K/UL (ref 0–0.11)
IMM GRANULOCYTES # BLD AUTO: 0.07 K/UL (ref 0–0.11)
IMM GRANULOCYTES # BLD AUTO: 0.07 K/UL (ref 0–0.11)
IMM GRANULOCYTES NFR BLD AUTO: 0.7 % (ref 0–0.9)
IMM GRANULOCYTES NFR BLD AUTO: 0.7 % (ref 0–0.9)
IMM GRANULOCYTES NFR BLD AUTO: 0.8 % (ref 0–0.9)
LACTATE SERPL-SCNC: 1.2 MMOL/L (ref 0.5–2)
LYMPHOCYTES # BLD AUTO: 0.33 K/UL (ref 1–4.8)
LYMPHOCYTES # BLD AUTO: 0.43 K/UL (ref 1–4.8)
LYMPHOCYTES # BLD AUTO: 0.61 K/UL (ref 1–4.8)
LYMPHOCYTES NFR BLD: 3.6 % (ref 22–41)
LYMPHOCYTES NFR BLD: 5.3 % (ref 22–41)
LYMPHOCYTES NFR BLD: 6 % (ref 22–41)
MAGNESIUM SERPL-MCNC: 2.5 MG/DL (ref 1.5–2.5)
MCF BLD TEG: 60.1 MM (ref 52–69)
MCF.PLATELET INHIB BLD ROTEM: 48.3 MM (ref 15–32)
MCH RBC QN AUTO: 29.5 PG (ref 27–33)
MCH RBC QN AUTO: 29.8 PG (ref 27–33)
MCH RBC QN AUTO: 30.5 PG (ref 27–33)
MCHC RBC AUTO-ENTMCNC: 33.3 G/DL (ref 33.7–35.3)
MCHC RBC AUTO-ENTMCNC: 33.5 G/DL (ref 33.7–35.3)
MCHC RBC AUTO-ENTMCNC: 35.1 G/DL (ref 33.7–35.3)
MCV RBC AUTO: 86.7 FL (ref 81.4–97.8)
MCV RBC AUTO: 88 FL (ref 81.4–97.8)
MCV RBC AUTO: 89.4 FL (ref 81.4–97.8)
MONOCYTES # BLD AUTO: 0.14 K/UL (ref 0–0.85)
MONOCYTES # BLD AUTO: 0.14 K/UL (ref 0–0.85)
MONOCYTES # BLD AUTO: 0.32 K/UL (ref 0–0.85)
MONOCYTES NFR BLD AUTO: 1.5 % (ref 0–13.4)
MONOCYTES NFR BLD AUTO: 1.7 % (ref 0–13.4)
MONOCYTES NFR BLD AUTO: 3.2 % (ref 0–13.4)
NEUTROPHILS # BLD AUTO: 7.39 K/UL (ref 1.82–7.42)
NEUTROPHILS # BLD AUTO: 8.68 K/UL (ref 1.82–7.42)
NEUTROPHILS # BLD AUTO: 9.09 K/UL (ref 1.82–7.42)
NEUTROPHILS NFR BLD: 89.9 % (ref 44–72)
NEUTROPHILS NFR BLD: 92 % (ref 44–72)
NEUTROPHILS NFR BLD: 94 % (ref 44–72)
NRBC # BLD AUTO: 0.03 K/UL
NRBC # BLD AUTO: 0.06 K/UL
NRBC # BLD AUTO: 0.08 K/UL
NRBC BLD-RTO: 0.3 /100 WBC
NRBC BLD-RTO: 0.7 /100 WBC
NRBC BLD-RTO: 0.8 /100 WBC
PA AA BLD-ACNC: ABNORMAL % (ref 0–11)
PA ADP BLD-ACNC: ABNORMAL % (ref 0–17)
PHOSPHATE SERPL-MCNC: 4.1 MG/DL (ref 2.5–4.5)
PLATELET # BLD AUTO: 90 K/UL (ref 164–446)
PLATELET # BLD AUTO: 96 K/UL (ref 164–446)
PLATELET # BLD AUTO: 99 K/UL (ref 164–446)
PMV BLD AUTO: 10.5 FL (ref 9–12.9)
PMV BLD AUTO: 10.5 FL (ref 9–12.9)
PMV BLD AUTO: 10.8 FL (ref 9–12.9)
POTASSIUM SERPL-SCNC: 4.2 MMOL/L (ref 3.6–5.5)
PROT SERPL-MCNC: 5.2 G/DL (ref 6–8.2)
RBC # BLD AUTO: 2.41 M/UL (ref 4.7–6.1)
RBC # BLD AUTO: 2.79 M/UL (ref 4.7–6.1)
RBC # BLD AUTO: 3.02 M/UL (ref 4.7–6.1)
SODIUM SERPL-SCNC: 138 MMOL/L (ref 135–145)
TEG ALGORITHM TGALG: ABNORMAL
WBC # BLD AUTO: 10.1 K/UL (ref 4.8–10.8)
WBC # BLD AUTO: 8.1 K/UL (ref 4.8–10.8)
WBC # BLD AUTO: 9.2 K/UL (ref 4.8–10.8)

## 2023-02-22 PROCEDURE — 70450 CT HEAD/BRAIN W/O DYE: CPT

## 2023-02-22 PROCEDURE — 85384 FIBRINOGEN ACTIVITY: CPT

## 2023-02-22 PROCEDURE — 84100 ASSAY OF PHOSPHORUS: CPT

## 2023-02-22 PROCEDURE — 700111 HCHG RX REV CODE 636 W/ 250 OVERRIDE (IP): Performed by: NURSE PRACTITIONER

## 2023-02-22 PROCEDURE — 86923 COMPATIBILITY TEST ELECTRIC: CPT

## 2023-02-22 PROCEDURE — 74177 CT ABD & PELVIS W/CONTRAST: CPT

## 2023-02-22 PROCEDURE — 700117 HCHG RX CONTRAST REV CODE 255: Performed by: EMERGENCY MEDICINE

## 2023-02-22 PROCEDURE — 36430 TRANSFUSION BLD/BLD COMPNT: CPT

## 2023-02-22 PROCEDURE — 700111 HCHG RX REV CODE 636 W/ 250 OVERRIDE (IP): Performed by: INTERNAL MEDICINE

## 2023-02-22 PROCEDURE — P9016 RBC LEUKOCYTES REDUCED: HCPCS | Mod: 91

## 2023-02-22 PROCEDURE — 83735 ASSAY OF MAGNESIUM: CPT

## 2023-02-22 PROCEDURE — 99221 1ST HOSP IP/OBS SF/LOW 40: CPT | Performed by: SURGERY

## 2023-02-22 PROCEDURE — 99291 CRITICAL CARE FIRST HOUR: CPT | Performed by: INTERNAL MEDICINE

## 2023-02-22 PROCEDURE — 85025 COMPLETE CBC W/AUTO DIFF WBC: CPT

## 2023-02-22 PROCEDURE — 700105 HCHG RX REV CODE 258: Performed by: NURSE PRACTITIONER

## 2023-02-22 PROCEDURE — 85576 BLOOD PLATELET AGGREGATION: CPT

## 2023-02-22 PROCEDURE — 85347 COAGULATION TIME ACTIVATED: CPT

## 2023-02-22 PROCEDURE — 700105 HCHG RX REV CODE 258: Performed by: INTERNAL MEDICINE

## 2023-02-22 PROCEDURE — 770022 HCHG ROOM/CARE - ICU (200)

## 2023-02-22 PROCEDURE — 80053 COMPREHEN METABOLIC PANEL: CPT

## 2023-02-22 PROCEDURE — 83605 ASSAY OF LACTIC ACID: CPT

## 2023-02-22 PROCEDURE — C9113 INJ PANTOPRAZOLE SODIUM, VIA: HCPCS | Performed by: INTERNAL MEDICINE

## 2023-02-22 RX ADMIN — PANTOPRAZOLE SODIUM 8 MG/HR: 40 INJECTION, POWDER, LYOPHILIZED, FOR SOLUTION INTRAVENOUS at 18:35

## 2023-02-22 RX ADMIN — SODIUM CHLORIDE, POTASSIUM CHLORIDE, SODIUM LACTATE AND CALCIUM CHLORIDE: 600; 310; 30; 20 INJECTION, SOLUTION INTRAVENOUS at 20:49

## 2023-02-22 RX ADMIN — PIPERACILLIN AND TAZOBACTAM 3.38 G: 3; .375 INJECTION, POWDER, LYOPHILIZED, FOR SOLUTION INTRAVENOUS; PARENTERAL at 00:28

## 2023-02-22 RX ADMIN — CALCIUM CHLORIDE 1000 MG: 100 INJECTION, SOLUTION INTRAVENOUS at 05:57

## 2023-02-22 RX ADMIN — PIPERACILLIN AND TAZOBACTAM 3.38 G: 3; .375 INJECTION, POWDER, LYOPHILIZED, FOR SOLUTION INTRAVENOUS; PARENTERAL at 15:48

## 2023-02-22 RX ADMIN — IOHEXOL 100 ML: 350 INJECTION, SOLUTION INTRAVENOUS at 04:45

## 2023-02-22 RX ADMIN — PANTOPRAZOLE SODIUM 8 MG/HR: 40 INJECTION, POWDER, LYOPHILIZED, FOR SOLUTION INTRAVENOUS at 08:29

## 2023-02-22 RX ADMIN — PIPERACILLIN AND TAZOBACTAM 3.38 G: 3; .375 INJECTION, POWDER, LYOPHILIZED, FOR SOLUTION INTRAVENOUS; PARENTERAL at 07:47

## 2023-02-22 RX ADMIN — HYDROCORTISONE SODIUM SUCCINATE 100 MG: 100 INJECTION, POWDER, FOR SOLUTION INTRAMUSCULAR; INTRAVENOUS at 05:57

## 2023-02-22 ASSESSMENT — LIFESTYLE VARIABLES
HAVE PEOPLE ANNOYED YOU BY CRITICIZING YOUR DRINKING: NO
HAVE YOU EVER FELT YOU SHOULD CUT DOWN ON YOUR DRINKING: NO
EVER FELT BAD OR GUILTY ABOUT YOUR DRINKING: NO
ALCOHOL_USE: NO
EVER HAD A DRINK FIRST THING IN THE MORNING TO STEADY YOUR NERVES TO GET RID OF A HANGOVER: NO
TOTAL SCORE: 0
CONSUMPTION TOTAL: INCOMPLETE
TOTAL SCORE: 0
TOTAL SCORE: 0

## 2023-02-22 ASSESSMENT — FIBROSIS 4 INDEX: FIB4 SCORE: 4.02

## 2023-02-22 ASSESSMENT — ENCOUNTER SYMPTOMS
HEADACHES: 0
VOMITING: 0
DIARRHEA: 0
FEVER: 0
BACK PAIN: 0
NERVOUS/ANXIOUS: 0
NAUSEA: 0
ABDOMINAL PAIN: 1
WEAKNESS: 0
SHORTNESS OF BREATH: 0

## 2023-02-22 ASSESSMENT — PAIN DESCRIPTION - PAIN TYPE
TYPE: ACUTE PAIN
TYPE: ACUTE PAIN

## 2023-02-22 NOTE — ASSESSMENT & PLAN NOTE
Upper and lower GI bleed, unclear reason for bleeding. Possibly related to cancer, but DDx include PUD, esophagitis/gastritis/duodenitis  S/p total 4 units of PRBC, last Hgb 7.1  Shock resolved.   Bleeding seems to have stopped.   Hgb has been stable in 8s.   Decrease CBC to q12H

## 2023-02-22 NOTE — ASSESSMENT & PLAN NOTE
Concerns related to severe sepsis/shock  O2 saturation goals > 92%  RT/O2 protocols  Pt is a confirmed DNI  Resolved

## 2023-02-22 NOTE — PROGRESS NOTES
Taken to CT for CT head and abdomen with transport and primary RN.    Vital signs and patient stable while on the monitor.

## 2023-02-22 NOTE — H&P
Critical Care History & Physical Note    Date of Service  2/21/2023    Primary Care Physician  Brendan Hargrove M.D.    Consultants  critical care    Code Status  DNAR/DNI    Chief Complaint  Chief Complaint   Patient presents with    ALOC       History of Presenting Illness  Carl Finney is a 72 y.o. male with the complicated past medical history of hypertension, hyperlipidemia, prostate cancer status post prostatectomy, gout, and recent diagnosis of pancreatic cancer in November 2022 with biliary obstruction status post stent with progression of disease and now with mets to the liver who received his first chemotherapy treatment on 2/16 has had worsening confusion with nausea vomiting abdominal pain over the past 2 days.  History was obtained from old records as well as the patient's wife and ER staff as the patient is much distress to discuss.  He does mention that the abdominal discomfort did worsen over the last 2 days; however, has chronic right upper abdominal pain.  No diarrhea.  Patient does mention that he had blood in his vomitus a couple of days ago.  No history of ulcers.  The patient also complains of fevers and chills.    In the ER the patient was found to be hypotensive and tachycardic with concerns with septic shock.  His hemoglobin returned at 3.4 and the patient was given 2 units of packed red blood cells.  In the meantime the patient was also given vancomycin and meropenem as well as the sepsis fluid bolus.  He will be admitted to the ICU for septic shock.    Further discussion with the patient's wife who is stuck in [place] because of the snowstorm, the wife does express that the patient is a DNR/DNI.  She reports that he would not want to be heroics.  She also reports that she wants him comfortable; however, would like to trial therapies for now.    I discussed the plan of care with patient, bedside RN, charge RN, pharmacy, and RT.    Review of Systems  Review of Systems   Unable  to perform ROS: Acuity of condition   Constitutional:  Positive for chills, fever and malaise/fatigue.   Gastrointestinal:  Positive for abdominal pain, nausea and vomiting.        Blood in vomitus     Past Medical History   has a past medical history of Arthritis, Bowel habit changes, Cancer (HCC) (2016), History of sepsis (7/2017), Renal disorder, and Urinary incontinence.    Surgical History   has a past surgical history that includes other abdominal surgery (3/9/2016); other orthopedic surgery (1993); inguinal hernia repair (Right, 9/30/2016); cystoscopy stent placement (Left, 7/7/2017); retrogrades (Right, 7/7/2017); and percutaneous nephrostolithotomy (Left, 8/28/2017).     Family History  Unable to obtain due to the acuity of the patient's condition    Social History   reports that he has never smoked. He has never used smokeless tobacco. He reports that he does not drink alcohol and does not use drugs.    Allergies  Allergies   Allergen Reactions    Ceftriaxone Unspecified     Unable to speak and high fever  RXN=7/6/17       Medications  Prior to Admission Medications   Prescriptions Last Dose Informant Patient Reported? Taking?   oxycodone-acetaminophen (PERCOCET) 5-325 MG Tab  Patient Yes No   Sig: Take 0.5-1 Tabs by mouth every four hours as needed.   oxycodone-acetaminophen (PERCOCET) 5-325 MG Tab   No No   Sig: Take 1-2 Tabs by mouth every four hours as needed.      Facility-Administered Medications: None       Physical Exam  Temp:  [37.2 °C (99 °F)-37.8 °C (100 °F)] 37.8 °C (100 °F)  Pulse:  [103-152] 144  Resp:  [18-56] 42  BP: ()/(38-92) 166/75  SpO2:  [77 %-100 %] 87 %  Blood Pressure : (!) 166/75   Temperature: 37.8 °C (100 °F)   Pulse: (!) 144   Respiration: (!) 42   Pulse Oximetry: (!) 87 %       Physical Exam  Vitals and nursing note reviewed.   Constitutional:       Appearance: He is ill-appearing and toxic-appearing.   HENT:      Head: Normocephalic and atraumatic.      Right Ear:  External ear normal.      Left Ear: External ear normal.      Nose: Nose normal. No rhinorrhea.      Mouth/Throat:      Mouth: Mucous membranes are dry.   Eyes:      General: No scleral icterus.     Conjunctiva/sclera: Conjunctivae normal.      Pupils: Pupils are equal, round, and reactive to light.   Cardiovascular:      Rate and Rhythm: Regular rhythm. Tachycardia present.      Pulses:           Radial pulses are 1+ on the right side and 1+ on the left side.        Dorsalis pedis pulses are 1+ on the right side and 1+ on the left side.      Heart sounds: No murmur heard.  Pulmonary:      Effort: Respiratory distress present.      Breath sounds: Wheezing present.   Abdominal:      General: There is no distension.      Palpations: Abdomen is soft.      Tenderness: There is abdominal tenderness. There is no rebound.      Comments: Diffuse tenderness    Musculoskeletal:         General: Normal range of motion.      Cervical back: Normal range of motion and neck supple.      Right lower leg: No edema.      Left lower leg: No edema.   Lymphadenopathy:      Cervical: No cervical adenopathy.   Skin:     General: Skin is warm and dry.      Capillary Refill: Capillary refill takes 2 to 3 seconds.      Findings: No rash.   Neurological:      General: No focal deficit present.      Mental Status: He is alert. He is disoriented.      Cranial Nerves: No cranial nerve deficit.      Sensory: No sensory deficit.      Motor: No weakness.   Psychiatric:      Comments: Unable to assess       Laboratory:  Recent Labs     02/21/23  1731   WBC 5.8   RBC 1.24*   HEMOGLOBIN 3.4*   HEMATOCRIT 10.4*   MCV 83.9   MCH 27.4   MCHC 32.7*   RDW 51.6*   PLATELETCT 122*   MPV 10.2     Recent Labs     02/21/23  1607   SODIUM 136   POTASSIUM 3.5*   CHLORIDE 105   CO2 18*   GLUCOSE 134*   BUN 58*   CREATININE 0.93   CALCIUM 8.3*     Recent Labs     02/21/23  1607   ALTSGPT 61*   ASTSGOT 54*   ALKPHOSPHAT 520*   TBILIRUBIN 3.0*   GLUCOSE 134*          No results for input(s): NTPROBNP in the last 72 hours.      No results for input(s): TROPONINT in the last 72 hours.    Imaging:  DX-CHEST-PORTABLE (1 VIEW)   Final Result      Mild bibasilar atelectasis.      CT-ABDOMEN-PELVIS WITH    (Results Pending)   CT-HEAD W/O    (Results Pending)       Assessment/Plan:  Justification for Admission Status  I anticipate this patient will require at least two midnights for appropriate medical management, necessitating inpatient admission because severe sepsis with septic shock from an intra-abdominal source with concerns of GI bleeding  as well as progressing metastatic pancreatic cancer    Patient will need a ICU (Adult and Pediatrics) bed on MEDICAL service .  The need is secondary to septic shock requiring active titration of vasopressors.    * Septic shock (HCC)- (present on admission)  Assessment & Plan  This is Septic shock Present on admission  SIRS criteria identified on my evaluation include: Fever, with temperature greater than 101 deg F, Tachycardia, with heart rate greater than 90 BPM and Tachypnea, with respirations greater than 20 per minute  Indicators of septic shock include: Severe sepsis present, persistent hypotension after 30 ml/kg completed, and initial lactate level result is >= 4 mmol/L.   Sources is: likely intra-abdominal  Sepsis protocol initiated  Crystalloid Fluid Administration: Fluid resuscitation ordered per standard protocol - 30 mL/kg per current or ideal body weight  IV antibiotics as appropriate for source of sepsis  Reassessment: I have reassessed the patient's hemodynamic status    Cont sepsis protocols  Trending lactate  Broad spectrum abx:  Zosyn and vancomycin  Check cortisol, but starting stress dose steroids  Monitor UOP  Vasopressors with active titration for MAP goals > 65  Follow cultures        Hypokalemia- (present on admission)  Assessment & Plan  Replete with 40meq of KCL  Check magnesium level    Goals of care,  counseling/discussion- (present on admission)  Assessment & Plan  2/21 - Wife is stuck in [place], [place] due to snowstorm, but confirms that patient is a DNR/DNI and wants him to be comfortable.  She does want to try abx, blood, fluids to see if this helps, but is aware of the severity of his illness.    Pancreatic cancer metastasized to liver (HCC)- (present on admission)  Assessment & Plan  Diagnosed in 11/2022  S/p biliary stent  S/p gemcitabine/abraxance on 2/16        Elevated alkaline phosphatase level- (present on admission)  Assessment & Plan  Pt with known metastatic pancreatic cancer  S/p biliary stent    Transaminitis- (present on admission)  Assessment & Plan  Suspect related to liver metastases  Trending  CT abd/pelvis pending    Acute respiratory failure with hypoxia (HCC)- (present on admission)  Assessment & Plan  Concerns related to severe sepsis/shock  O2 saturation goals > 92%  RT/O2 protocols  May benefit from HFNC  Pt is a confirmed DNI    Acute encephalopathy- (present on admission)  Assessment & Plan  Suspect metabolic due to sepsis  Cont to correct all metabolic disturbances  Treating sepsis  Limit sedatives  CT head pending     Acute upper GI bleeding- (present on admission)  Assessment & Plan  Serial Hb every 6 hours  PPI bolus and drip  Conservative transfusion protocols for Hb<7  Octreotide bolus and infusion  TEG pending    Acute blood loss anemia- (present on admission)  Assessment & Plan  Concern for upper GI bleeding  S/p 2 units pRBCs in ER  Serial Hb every 6 hours  Check TEG  CT abd/pelvis pending        VTE prophylaxis: SCDs/TEDs and pharmacologic prophylaxis contraindicated due to gi bleeding    Patient is critically ill at this time requiring active titration of vasopressors for septic shock as well as multiple laboratories for presumed GI bleeding as well as correction of metabolic abnormalities.  I have assessed and reassessed the patient's neurological status, cardiovascular  status, hemodynamics, and titratable drips.  The patient is at high risk of clinical deterioration, worsening vital organ dysfunction, and death without the above critical care interventions.    Critical care time equals 135 minutes.

## 2023-02-22 NOTE — PROGRESS NOTES
Critical Care Progress Note    Date of admission  2/21/2023    Chief Complaint  72 y.o. male admitted 2/21/2023 with metastatic pancreatic CA to liver now with septic shock. CT A/P with emphysematous cholecystitis, mild pneumobilia and possible pancreatic abscess?     Hospital Course  No notes on file    Interval Problem Update  Reviewed last 24 hour events:  Remains critically ill   HR in 60-70s  SBP in 90-100s, MAP >65  Off pressors.   Afebrile  On 2L NC, sat >92%  Hgb 3.4 to 7.1 after total 4units PRBCs. Will transfuse 1more unit PRBC  Creatinine 0.93, HCO3 18  Vanco and piptazo. MRSA negative, will discontinue piptazo  Procalcitonin 15  Lactate 3.8  Bcx positive for GNB  INR 1.5  Palliative care was consulted.     On protonix gtt, octreotide gtt    Review of Systems  Review of Systems   Constitutional:  Negative for fever and malaise/fatigue.   Respiratory:  Negative for shortness of breath.    Cardiovascular:  Negative for chest pain and leg swelling.   Gastrointestinal:  Positive for abdominal pain. Negative for diarrhea, nausea and vomiting.   Musculoskeletal:  Negative for back pain.   Neurological:  Negative for weakness and headaches.   Psychiatric/Behavioral:  The patient is not nervous/anxious.    All other systems reviewed and are negative.     Vital Signs for last 24 hours   Temp:  [36.3 °C (97.3 °F)-37.8 °C (100 °F)] 36.4 °C (97.5 °F)  Pulse:  [] 70  Resp:  [6-56] 16  BP: ()/(38-92) 97/61  SpO2:  [77 %-100 %] 94 %    Hemodynamic parameters for last 24 hours       Respiratory Information for the last 24 hours       Physical Exam   Physical Exam  Vitals and nursing note reviewed.   Constitutional:       General: He is not in acute distress.     Appearance: He is ill-appearing. He is not toxic-appearing.   HENT:      Head: Normocephalic and atraumatic.      Mouth/Throat:      Mouth: Mucous membranes are moist.   Cardiovascular:      Rate and Rhythm: Normal rate and regular rhythm.       Pulses: Normal pulses.      Heart sounds: Normal heart sounds. No murmur heard.  Pulmonary:      Effort: Pulmonary effort is normal. No respiratory distress.      Breath sounds: Normal breath sounds. No wheezing, rhonchi or rales.   Abdominal:      General: There is no distension.      Palpations: Abdomen is soft.      Tenderness: There is abdominal tenderness. There is no guarding.      Comments: Mild tenderness in epigastric and RUQ   Musculoskeletal:         General: No swelling or tenderness.      Cervical back: Neck supple.      Right lower leg: No edema.      Left lower leg: No edema.   Skin:     General: Skin is warm and dry.      Capillary Refill: Capillary refill takes less than 2 seconds.      Coloration: Skin is not jaundiced.   Neurological:      General: No focal deficit present.      Mental Status: He is alert and oriented to person, place, and time.      Cranial Nerves: No cranial nerve deficit.   Psychiatric:         Mood and Affect: Mood normal.         Behavior: Behavior normal.       Medications  Current Facility-Administered Medications   Medication Dose Route Frequency Provider Last Rate Last Admin    lactated ringers infusion   Intravenous Continuous Leighann Ellison M.D.   Stopped at 02/21/23 2212    ondansetron (ZOFRAN) syringe/vial injection 4 mg  4 mg Intravenous Q4HRS PRN Leighann Ellison M.D.        ondansetron (ZOFRAN ODT) dispertab 4 mg  4 mg Oral Q4HRS PRN Leighann Ellison M.D.        HYDROmorphone (Dilaudid) injection 0.5 mg  0.5 mg Intravenous Q HOUR PRN Leighann Ellison M.D.        octreotide (Sandostatin) 1,250 mcg in  mL Infusion  50 mcg/hr Intravenous Continuous Leighann Ellison M.D. 10 mL/hr at 02/22/23 0200 50 mcg/hr at 02/22/23 0200    pantoprazole (Protonix) 80 mg in  mL Infusion  8 mg/hr Intravenous Continuous Leighann Ellison M.D. 25 mL/hr at 02/21/23 2255 8 mg/hr at 02/21/23 2255    lactated ringers infusion (BOLUS)  500 mL Intravenous Once PRN Leighann FLORIAN  JOS Ellison        norepinephrine (Levophed) 8 mg in 250 mL NS infusion (premix)  0-1 mcg/kg/min (Ideal) Intravenous Continuous Leighann Ellison M.D.   Held at 02/21/23 2015    And    vasopressin (Vasostrict) 20 Units in  mL Infusion  0.03 Units/min Intravenous Continuous Leighann Ellison M.D.   Held at 02/21/23 2015    hydrocortisone sodium succinate PF (Solu-CORTEF) 100 MG injection 100 mg  100 mg Intravenous Q8HRS Leighann Ellison M.D.   100 mg at 02/22/23 0557    piperacillin-tazobactam (Zosyn) 3.375 g in  mL IVPB  3.375 g Intravenous Q8HR Leighann Ellison M.D.   Stopped at 02/22/23 0428    MD Alert...Vancomycin per Pharmacy  1 Each Other PHARMACY TO DOSE Leighann Ellison M.D.        vancomycin (VANCOCIN) 1,000 mg in  mL IVPB  1,000 mg Intravenous Q24HR Leighann Ellison M.D.        NS infusion   Intravenous Continuous Promise L. Latona   Infusion Ended Prior to Shift at 02/22/23 0000    NS infusion   Intravenous Continuous Promise L. Latona   Dose not Required at 02/22/23 0027       Fluids    Intake/Output Summary (Last 24 hours) at 2/22/2023 0637  Last data filed at 2/22/2023 0230  Gross per 24 hour   Intake 2702.06 ml   Output 600 ml   Net 2102.06 ml       Laboratory          Recent Labs     02/21/23  1607 02/21/23  2139   SODIUM 136  --    POTASSIUM 3.5*  --    CHLORIDE 105  --    CO2 18*  --    BUN 58*  --    CREATININE 0.93  --    MAGNESIUM  --  2.4   CALCIUM 8.3*  --      Recent Labs     02/21/23  1607   ALTSGPT 61*   ASTSGOT 54*   ALKPHOSPHAT 520*   TBILIRUBIN 3.0*   GLUCOSE 134*     Recent Labs     02/21/23  1607 02/21/23  1731   WBC  --  5.8   NEUTSPOLYS  --  88.90*   LYMPHOCYTES  --  6.20*   MONOCYTES  --  3.30   EOSINOPHILS  --  0.00   BASOPHILS  --  0.20   ASTSGOT 54*  --    ALTSGPT 61*  --    ALKPHOSPHAT 520*  --    TBILIRUBIN 3.0*  --      Recent Labs     02/21/23  1731 02/21/23  2139 02/21/23  2224   RBC 1.24*  --   --    HEMOGLOBIN 3.4*  --  5.5*   HEMATOCRIT 10.4*  --   --     PLATELETCT 122*  --   --    PROTHROMBTM  --  18.2*  --    APTT  --  26.3  --    INR  --  1.55*  --        Imaging  X-Ray:  I have personally reviewed the images and compared with prior images.    Assessment/Plan  * Septic shock (HCC)- (present on admission)  Assessment & Plan  This is Septic shock Present on admission  SIRS criteria identified on my evaluation include: Fever, with temperature greater than 101 deg F, Tachycardia, with heart rate greater than 90 BPM and Tachypnea, with respirations greater than 20 per minute  Indicators of septic shock include: Severe sepsis present, persistent hypotension after 30 ml/kg completed, and initial lactate level result is >= 4 mmol/L.   Sources is: likely intra-abdominal  Sepsis protocol initiated  Crystalloid Fluid Administration: Fluid resuscitation ordered per standard protocol - 30 mL/kg per current or ideal body weight  IV antibiotics as appropriate for source of sepsis  Reassessment: I have reassessed the patient's hemodynamic status    Likely combination of hemorrhagic shock and septic shock.   Immunocompromised host  Source: GI - emphysematous cholecystitis, pneumobilia, ? Pancreatic abscess in the setting of metastatic pancreatic CA and GNB bacteremia  Initial lactate 3.8, repeat is 1.2  Broad spectrum abx:  Zosyn   Can discontinue vancomycin due to negative MRSA screen   Pt was fluid resuscitated and blood resuscitated.   Pressors were ordered but never got started  Hepatobiliary surgery was consulted   Can discontinue stress dose steroids  Follow blood cultures  Repeat blood cultures tomorrow         Hemorrhagic shock (HCC)  Assessment & Plan  Upper and lower GI bleed, unclear reason for bleeding.   S/p total 4 units of PRBC, last Hgb 7.1  CBC Q6H    Emphysematous cholecystitis  Assessment & Plan  Evidence of emphysematous cholecystitis with mild pneumobilia, in the setting of metastatic pancreatic CA  Likely the source for the GNB bacteremia and septic  shock  Hepatobiliary surgery was consulted.     Hypokalemia- (present on admission)  Assessment & Plan  Replete with 40meq of KCL  Keep K >4 and Mg >2    Goals of care, counseling/discussion- (present on admission)  Assessment & Plan  2/21 - Wife is stuck in [place], [place] due to snowstorm, but confirms that patient is a DNR/DNI and wants him to be comfortable.  She does want to try abx, blood, fluids to see if this helps, but is aware of the severity of his illness.    2/22 patient seems thinking of hospice not hasn't made decision. I was also reported that patient was thinking about going back to [place] for physician-assisted suicide option. Palliative care was consulted    Pancreatic cancer metastasized to liver (HCC)- (present on admission)  Assessment & Plan  Diagnosed in 11/2022  S/p biliary stent  S/p gemcitabine/abraxance on 2/16        Elevated alkaline phosphatase level- (present on admission)  Assessment & Plan  Pt with known metastatic pancreatic cancer  S/p biliary stent    Transaminitis- (present on admission)  Assessment & Plan  Suspect related to liver metastases  Trending  CT abd/pelvis pending    Acute respiratory failure with hypoxia (HCC)- (present on admission)  Assessment & Plan  Concerns related to severe sepsis/shock  O2 saturation goals > 92%  RT/O2 protocols  May benefit from HFNC  Pt is a confirmed DNI    Acute encephalopathy- (present on admission)  Assessment & Plan  Suspect metabolic due to sepsis  Cont to correct all metabolic disturbances  Treating sepsis  Limit sedatives  CT head pending     Acute upper GI bleeding- (present on admission)  Assessment & Plan  Serial Hb every 6 hours  PPI bolus and drip  Conservative transfusion protocols for Hb<7  Octreotide bolus and infusion  TEG pending    Acute blood loss anemia- (present on admission)  Assessment & Plan  Concern for upper GI bleeding  S/p 4 units pRBCs total, Hgb 3.3 to 7.1  Will transfuse one more unit PRBC  H/H q6h  Check TEG  pending         VTE:  Contraindicated  Ulcer: PPI  Lines: None    I have performed a physical exam and reviewed and updated ROS and Plan today (2/22/2023). In review of yesterday's note (2/21/2023), there are no changes except as documented above.     Discussed patient condition and risk of morbidity and/or mortality with RN, RT, Pharmacy, Charge nurse / hot rounds, and Patient  The patient remains critically ill.  Critical care time = 65 minutes in directly providing and coordinating critical care and extensive data review.  No time overlap and excludes procedures.

## 2023-02-22 NOTE — PROGRESS NOTES
Lab called with critical result of HBG of 5.5 at 2337. Critical lab result read back to Promise Mayo NP.   Dr. Promise Mayo NP notified of critical lab result at 2337.  Critical lab result read back by Dr. Promise Mayo NP.

## 2023-02-22 NOTE — ED NOTES
Med Rec complete per patient's spouse over phone  No oral antibiotics in the last 30 days per spouse  Allergies reviewed  Preferred Pharmacy: [facility] on [facility] in [place]  Spouse reports he sometimes takes a stool softener and Tums after Chemo, last doses unknown.

## 2023-02-22 NOTE — PROGRESS NOTES
Lab called with critical result of postive blood cultures at 0635. Critical lab result read back.   Dr. Promise Mayo, NP notified of critical lab result at 0645.

## 2023-02-22 NOTE — CARE PLAN
The patient is {Patient Stability:1585810}         Progress made toward(s) clinical / shift goals:  ***    Patient is not progressing towards the following goals:

## 2023-02-22 NOTE — ASSESSMENT & PLAN NOTE
2/21 - Wife is stuck in [place], [place] due to snowstorm, but confirms that patient is a DNR/DNI and wants him to be comfortable.  She does want to try abx, blood, fluids to see if this helps, but is aware of the severity of his illness.    2/22 patient seems thinking of hospice not hasn't made decision. I was also reported that patient was thinking about going back to [place] for physician-assisted suicide option. Palliative care was consulted. Pt okay with cholecystotomy tube

## 2023-02-22 NOTE — ED NOTES
Bedside report given to pamella REYES. Pt transported to RICU with RN and tech and chart and all belongings. 2nd unit of PRBCs started prior to transport.

## 2023-02-22 NOTE — ED TRIAGE NOTES
.Patient BIB [facility] to Room Red 11 for ALOC x2 days  Chief Complaint   Patient presents with    ALOC

## 2023-02-22 NOTE — ED NOTES
Pt c/o feeling cold, temp rechecked and up to 100.0, 2nd lactic sent to lab, blood bank called for 1st unit but running COD now

## 2023-02-22 NOTE — DISCHARGE PLANNING
Care Transition Team Assessment    Mr. Finney is from [place], currently a/o x 4 and wishes to become medically stable and be txf back to [place] where he will resume treatment there.  His understanding is that he will succumb to his cancer and wished to explore and likely participate in assisted suicide in [place].  Prior to his admission his wife, Lori was his care provider as Mr. Finney needed assistance w/ ADL's.  He denies abuse/drugs/etoh/tobacco, payor is medicare      Information Source  Orientation Level: Oriented X4  Information Given By: Patient  Informant's Name: Carl Finney  Who is responsible for making decisions for patient? : Patient    Readmission Evaluation  Is this a readmission?: No    Elopement Risk  Legal Hold: No  Ambulatory or Self Mobile in Wheelchair: No-Not an Elopement Risk  Elopement Risk: Not at Risk for Elopement    Interdisciplinary Discharge Planning  Does Admitting Nurse Feel This Could be a Complex Discharge?: No  Primary Care Physician: oncologyst in [place]  Lives with - Patient's Self Care Capacity: Spouse  Patient or legal guardian wants to designate a caregiver: Yes  Caregiver name: Lori Finney  Caregiver contact info: [phone]  Support Systems: Family Member(s)  Housing / Facility: Unable To Determine At This Time  Mobility Issues: Yes  Durable Medical Equipment: Not Applicable    Discharge Preparedness  What is your plan after discharge?: Other (comment) (Mr. Finney wishes to be made medically stable then trx back to [place] and continue treatment there)    Vision / Hearing Impairment  Vision Impairment : Yes  Right Eye Vision: Impaired, Wears Glasses  Left Eye Vision: Impaired, Wears Glasses  Hearing Impairment : No    Advance Directive  Advance Directive?: Living Will    Domestic Abuse  Have you ever been the victim of abuse or violence?: No  Physical Abuse or Sexual Abuse: No  Verbal Abuse or Emotional Abuse: No  Possible Abuse/Neglect Reported to:: Not  Applicable    Psychological Assessment  History of Substance Abuse: None

## 2023-02-22 NOTE — ED NOTES
Assumed care from Fannie RN. This RN at bedside. Pt tachy and hypotensive. Per day shift RNs, blood on way. Per ERP, hgb 3, pt is also a DNR/DNI

## 2023-02-22 NOTE — ASSESSMENT & PLAN NOTE
Diagnosed in 11/2022  S/p biliary stent  S/p gemcitabine/abraxance on 2/16  Followed by [facility] Hem/Onc Dr. Villegas.

## 2023-02-22 NOTE — ASSESSMENT & PLAN NOTE
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   S/p right perchole tube on 2/23  Hepatobiliary surgery is following, appreciate help.

## 2023-02-22 NOTE — PROGRESS NOTES
Pharmacy Vancomycin Kinetics Note for 2/21/2023     72 y.o. male on Vancomycin day # 1     Vancomycin Indication (AUC Dosing):  (unknown source of infection)     Provider specified end date: 02/28/23    Active Antibiotics (From admission, onward)      Ordered     Ordering Provider       Tue Feb 21, 2023  7:59 PM    02/21/23 1959  MD Alert...Vancomycin per Pharmacy  (Unknown Source of Infection (Severe Sepsis))  PHARMACY TO DOSE        Question:  Indication(s) for vancomycin?  Answer:  Unknown source of infection    Leighann Ellison M.D.    02/21/23 1959  piperacillin-tazobactam (Zosyn) 3.375 g in  mL IVPB  (Unknown Source of Infection (Severe Sepsis))  EVERY 8 HOURS        See Hyperspace for full Linked Orders Report.    Leighann Ellison M.D.    02/21/23 1959  piperacillin-tazobactam (Zosyn) 4.5 g in  mL IVPB  (Unknown Source of Infection (Severe Sepsis))  ONCE        See Hyperspace for full Linked Orders Report.    Leighann Ellison M.D.       [place] Feb 21, 2023  4:15 PM    02/21/23 1615  vancomycin (VANCOCIN) 2,000 mg in  mL IVPB  (vancomycin (VANCOCIN) IV (LD + Maintenance))  ONCE         Charlie Gunderson M.D.            Dosing Weight: 65 kg (143 lb 4.8 oz)      Admission History: Admitted on 2/21/2023 for Septic shock (HCC) [A41.9, R65.21]  Pertinent history: 73 yo M started on vancomycin and zosyn empiric for unknown source of infection    Allergies:     Ceftriaxone     Pertinent cultures to date:     Results       Procedure Component Value Units Date/Time    Urinalysis [[id number]]  (Abnormal) Collected: 02/21/23 1958    Order Status: Completed Specimen: Urine Updated: 02/21/23 2033     Color DK Yellow     Character Clear     Specific Gravity 1.015     Ph 5.0     Glucose Negative mg/dL      Ketones Negative mg/dL      Protein Negative mg/dL      Bilirubin Small     Urobilinogen, Urine 0.2     Nitrite Negative     Leukocyte Esterase Negative     Occult Blood Negative     Micro Urine Req see  "below     Comment: Microscopic examination not performed when specimen is clear  and chemically negative for protein, blood, leukocyte esterase  and nitrite.         Narrative:      If not done within the last 24 hours    Blood Culture [[id number]]     Order Status: Sent Specimen: Blood from Peripheral     Blood Culture [[id number]]     Order Status: Sent Specimen: Blood from Peripheral     Urinalysis [[id number]]     Order Status: Canceled Specimen: Urine, Clean Catch     MRSA By PCR (Amp) [[id number]]     Order Status: Sent Specimen: Blood from Nares     Blood Culture [[id number]]     Order Status: Canceled Specimen: Blood from Peripheral     Blood Culture [[id number]]     Order Status: Canceled Specimen: Blood from Peripheral     Urinalysis [[id number]]  (Abnormal) Collected: 02/21/23 1807    Order Status: Completed Specimen: Urine Updated: 02/21/23 1835     Color DK Yellow     Character Clear     Specific Gravity 1.015     Ph 5.0     Glucose Negative mg/dL      Ketones Negative mg/dL      Protein Negative mg/dL      Bilirubin Moderate     Urobilinogen, Urine 0.2     Nitrite Negative     Leukocyte Esterase Negative     Occult Blood Negative     Micro Urine Req see below     Comment: Microscopic examination not performed when specimen is clear  and chemically negative for protein, blood, leukocyte esterase  and nitrite.         Narrative:      Indication for culture:->Evaluation for sepsis without a  clear source of infection    Urine Culture (New) [[id number]] Collected: 02/21/23 1807    Order Status: Sent Specimen: Urine Updated: 02/21/23 1822    Narrative:      Indication for culture:->Evaluation for sepsis without a  clear source of infection    Blood Culture [[id number]] Collected: 02/21/23 1731    Order Status: Sent Specimen: Blood from Peripheral Updated: 02/21/23 1810    Narrative:      Per Hospital Policy: Only change Specimen Src: to \"Line\" if  specified by physician order.    Blood Culture [[id number]] Collected: " "23 1637    Order Status: Sent Specimen: Blood from Peripheral Updated: 23 1735    Narrative:      Per Hospital Policy: Only change Specimen Src: to \"Line\" if  specified by physician order.            Labs:     Estimated Creatinine Clearance: 67 mL/min (by C-G formula based on SCr of 0.93 mg/dL).  Recent Labs     23  1731   WBC 5.8   NEUTSPOLYS 88.90*     Recent Labs     23  1607   BUN 58*   CREATININE 0.93   ALBUMIN 2.4*       Intake/Output Summary (Last 24 hours) at 2023  Last data filed at 2023  Gross per 24 hour   Intake 300 ml   Output --   Net 300 ml      BP 99/57   Pulse (!) 122   Temp 36.9 °C (98.5 °F) (Tympanic)   Resp (!) 22   Ht 1.88 m (6' 2\")   Wt 65 kg (143 lb 4.8 oz)   SpO2 98%  Temp (24hrs), Av.3 °C (99.2 °F), Min:36.9 °C (98.5 °F), Max:37.8 °C (100 °F)      List concerns for Vancomycin clearance:     Age;BUN/Scr ratio greater than 20:1;Malnutrition/Low albumin;Nephrotoxic drugs;Pressors/Hypotension    Pharmacokinetics:     AUC kinetics:   Ke (hr ^-1): 0.06 hr^-1  Half life: 11.55 hr  Clearance: 2.535  Estimated TDD: 1267.5  Estimated Dose: 718  Estimated interval: 13.6        A/P:     -  Vancomycin dose: vancomycin 2000 mg IV loading dose followed by vancomycin 1000 mg IV every 24 hours, consider changing to pulse vancomycin dosing if renal function worsens or urine output decreases     -  Next vancomycin level(s): defer    -  Predicted vancomycin AUC from initial AUC test calculator: 394 mg·hr/L    -  Comments:     Ira Guzman, PharmD    "

## 2023-02-22 NOTE — ASSESSMENT & PLAN NOTE
This is Septic shock Present on admission  SIRS criteria identified on my evaluation include: Fever, with temperature greater than 101 deg F, Tachycardia, with heart rate greater than 90 BPM and Tachypnea, with respirations greater than 20 per minute  Indicators of septic shock include: Severe sepsis present, persistent hypotension after 30 ml/kg completed, and initial lactate level result is >= 4 mmol/L.   Sources is: likely intra-abdominal  Sepsis protocol initiated  Crystalloid Fluid Administration: Fluid resuscitation ordered per standard protocol - 30 mL/kg per current or ideal body weight  IV antibiotics as appropriate for source of sepsis  Reassessment: I have reassessed the patient's hemodynamic status    Likely combination of hemorrhagic shock and septic shock.   Immunocompromised host  Source: GI - emphysematous cholecystitis, pneumobilia, ? Pancreatic abscess in the setting of metastatic pancreatic CA and GNB bacteremia  S/p right cholecystotomy tube. Pt not surgical candidate and pt does not want anything invasive.   Initial lactate 3.8, repeat is 1.2  Broad spectrum abx:  Zosyn   Pt was fluid resuscitated and blood resuscitated. Total 5U PRBCs  Blood cultures + Enterobacter. 2/23 repeated blood cultures pending.

## 2023-02-22 NOTE — ASSESSMENT & PLAN NOTE
Serial Hb every 6 hours  PPI bolus and drip  De-escalate to protonix BID  Conservative transfusion protocols for Hb<7  No hx of cirrhosis.  GI was consulted.   Wife/patient at this time would like to hold endoscopy  Will start full liquid

## 2023-02-22 NOTE — CONSULTS
Surgical Oncology History and Physical    Date of Evaluation: 2023    Reason for Referral: Cholecystitis    Referred By: Lambert Stern MD     HPI: Patient is a 72-year-old male with pancreatic adenocarcinoma with metastasis to the liver who presented to the hospital with acute blood loss anemia secondary to GI bleeding.  After resuscitation with blood and crystalloid he had a CT scan performed which identified an acutely thickened gallbladder with some areas of emphysematous cholecystitis apparent.  He did not have an elevated white blood cell count but he did have 1 of 2 peripheral cultures positive at the time of his admission.  He was put in the ICU, transfused with an appropriate response in his hemoglobin, Protonix and octreotide drips were started and he was placed on antibiotics.  He has since stabilized and I was asked to see him in regards to the emphysematous gallbladder.    In discussion with the patient he had had some episodes of hematemesis few weeks ago but this is largely resolved but he was having purple/black stools pretty consistently at home.  He recently started chemotherapy with Dr. Villegas at [facility] on 2/15/2023 and was next scheduled for chemotherapy on 3/1/2023.    He currently is feeling well, he denies any ongoing abdominal pain, he denies hematemesis.  He has had no fevers or chills and has had a normal white blood cell count has been in the hospital.  He does note he has been having ongoing abdominal pain while at home which she thought was secondary to his pancreatic cancer.      Body mass index is 18.4 kg/m².    ECO    Past Medical History:          Past Medical History:   Diagnosis Date    Arthritis     gout    Bowel habit changes     IBS    Cancer (HCC) 2016    prostate    Fall     History of sepsis 2017    Jaundice     Renal disorder     kidney stones    Urinary incontinence        Past Surgical History:        Past Surgical History:   Procedure Laterality Date  "   PERCUTANEOUS NEPHROSTOLITHOTOMY Left 8/28/2017    Procedure: PERCUTANEOUS NEPHROSTOLITHOTOMY;  Surgeon: Zaki Bustos M.D.;  Location: SURGERY [facility];  Service: Urology    CYSTOSCOPY STENT PLACEMENT Left 7/7/2017    Procedure: CYSTOSCOPY STENT PLACEMENT;  Surgeon: Hadley Garcia M.D.;  Location: SURGERY [facility];  Service:     RETROGRADES Right 7/7/2017    Procedure: RETROGRADES;  Surgeon: Hadley Garcia M.D.;  Location: SURGERY [facility];  Service:     INGUINAL HERNIA REPAIR Right 9/30/2016    Procedure: INGUINAL HERNIA REPAIR;  Surgeon: Lisette Leos M.D.;  Location: SURGERY [facility];  Service:     OTHER ABDOMINAL SURGERY  3/9/2016    prostatectomy, [facility]    OTHER ORTHOPEDIC SURGERY  1993    ACL left knee       Current Medications:      Home Medications    Medication Sig Taking? Last Dose Authorizing Provider   ondansetron (ZOFRAN) 8 MG Tab Take 8 mg by mouth every 8 hours as needed. Yes \"FEW DAYS AGO\" at [facility]   calcium carbonate (TUMS) 500 MG Chew Tab Chew 500 mg as needed. Yes UNK at [facility]   NON SPECIFIED Take 1 Tablet by mouth 1 time a day as needed. Yes UNK at [facility]   oxyCODONE immediate release (ROXICODONE) 10 MG immediate release tablet Take 10 mg by mouth every four hours as needed.  2/20/2023 at [facility]       Allergies:         Allergies   Allergen Reactions    Ceftriaxone Unspecified     Unable to speak and high fever  RXN=7/6/17       Family History:        No family history on file.    Social History:                 Social History     Socioeconomic History    Marital status:      Spouse name: Not on file    Number of children: Not on file    Years of education: Not on file    Highest education level: Not on file   Occupational History    Not on file   Tobacco Use    Smoking status: Never    Smokeless tobacco: Never   Vaping Use    Vaping Use: Never used   Substance " "and Sexual Activity    Alcohol use: No    Drug use: No    Sexual activity: Not on file   Other Topics Concern    Not on file   Social History Narrative    Not on file     Social Determinants of Health     Financial Resource Strain: Not on file   Food Insecurity: Not on file   Transportation Needs: Not on file   Physical Activity: Not on file   Stress: Not on file   Social Connections: Not on file   Intimate Partner Violence: Not on file   Housing Stability: Not on file       Review of Systems:  Review of Systems - History obtained from the patient    Review of Systems   Constitutional:  Positive for malaise/fatigue and weight loss. Negative for chills and fever.   HENT:  Negative for congestion, ear discharge, ear pain, hearing loss and nosebleeds.    Eyes:  Negative for blurred vision, double vision, photophobia, pain and discharge.   Respiratory:  Negative for cough, hemoptysis and sputum production.    Cardiovascular:  Negative for chest pain, palpitations, orthopnea and claudication.   Gastrointestinal:  Positive for abdominal pain and blood in stool. Negative for constipation, diarrhea, heartburn, nausea and vomiting.   Genitourinary:  Negative for dysuria, frequency, hematuria and urgency.   Musculoskeletal:  Negative for back pain, joint pain, myalgias and neck pain.   Skin:  Negative for itching and rash.   Neurological:  Negative for dizziness, tingling, tremors, sensory change, speech change, focal weakness and headaches.   Endo/Heme/Allergies:  Negative for environmental allergies. Does not bruise/bleed easily.   Psychiatric/Behavioral:  Negative for depression, hallucinations, substance abuse and suicidal ideas. The patient is not nervous/anxious.      All other ROS negative.      Physical Exam:  BP 98/60   Pulse 69   Temp 36.5 °C (97.7 °F) (Temporal)   Resp (!) 22   Ht 1.88 m (6' 2\")   Wt 65 kg (143 lb 4.8 oz)   SpO2 93%   BMI 18.40 kg/m²       Physical Exam  Constitutional:       General: He is " not in acute distress.     Appearance: Normal appearance. He is not ill-appearing.   HENT:      Head: Normocephalic.   Cardiovascular:      Rate and Rhythm: Normal rate and regular rhythm.   Pulmonary:      Effort: Pulmonary effort is normal. No respiratory distress.   Abdominal:      General: There is no distension.      Palpations: Abdomen is soft.      Tenderness: There is no abdominal tenderness.   Musculoskeletal:         General: No swelling, tenderness or deformity.      Cervical back: Neck supple.   Skin:     Coloration: Skin is not jaundiced or pale.      Findings: No bruising or erythema.   Neurological:      Mental Status: He is alert.            Imaging:   CT-ABDOMEN-PELVIS WITH  Addendum: Findings were discussed with Dr. Stern on 2/22/2023 6:28 AM.  Narrative: 2/22/2023 3:54 AM    HISTORY/REASON FOR EXAM:  Sepsis.    TECHNIQUE/EXAM DESCRIPTION:   CT scan of the abdomen and pelvis with contrast.    Contrast-enhanced helical scanning was obtained from the diaphragmatic domes through the pubic symphysis following the bolus administration of nonionic contrast without complication.    100 mL of Omnipaque 350 nonionic contrast was administered without complication.    Low dose optimization technique was utilized for this CT exam including automated exposure control and adjustment of the mA and/or kV according to patient size.    COMPARISON: None.    FINDINGS:    Lower chest: Atelectasis in the dependent lower lobes. Coronary calcifications.    Liver: Periportal edema. Moderate intrahepatic biliary dilatation. Multiple hyperdense structures within the liver, some of them with associated low-attenuation halos. They measure up to 1.4 cm in the posterior right hepatic lobe. The majority of them   are located in the periphery of the liver. Mild pneumobilia.    Gallbladder: Distended, with thickened wall and air in the wall. Pericholecystic fat stranding and pericholecystic fluid.    Common bile duct: Dilated,  measuring up to 1.3 cm.    Pancreas: Severely atrophic, with severe pancreatic ductal dilatation. Pancreatic duct measures 1 cm. A 3.2 x 2.7 cm mass in the pancreatic head with central 6 mm hyperdensity. It abuts the portal vein and SMV, and the results in mild narrowing of the   portal vein. It also abuts the hepatic artery.    Spleen: No mass.    Adrenals: No mass.    Kidneys: No suspicious mass lesions. Punctate nonobstructing left renal stone. No hydronephrosis.    Stomach, small bowel, colon: No bowel wall thickening or obstruction. Metallic stent in the lumen of the sigmoid colon. Colonic diverticulosis. Normal appendix.    Peritoneal cavity: Small volume ascites.    Lymph nodes: No enlarged nodes by size criteria.    Aorta: No aneurysm. Atherosclerosis.    Pelvic organs: Unremarkable.    Musculoskeletal structures: No acute fracture or destructive lesion.  Impression: 1.  Distended gallbladder with air in the gallbladder wall and pericholecystic fluid/fat stranding, highly suggestive of acute emphysematous cholecystitis.  2.  Pneumobilia, moderate biliary dilatation and CBD wall enhancement. A component of cholangitis is likely.  3.  A 3.2 cm pancreatic mass obstructing the CBD and pancreatic ducts and resulting in pancreatic atrophy.  4.  The mass abuts and narrows the portal vein. It also abuts the SMV and hepatic artery.  5.  Multiple hypoattenuation hepatic lesions. Some of them likely represents pancreatic cancer metastases, but some may represent small abscesses. They measure up to 1.4 cm. Consider evaluation with contrast-enhanced abdominal MRI.  6.  Metallic stent, located in the lumen of the sigmoid colon.  7.  Punctate nonobstructing left nephrolithiasis.  8.  Small volume ascites.    Attempts to contact patient's provider regarding potentially critical findings were initiated on 2/22/2023 0558 AM.  CT-HEAD W/O  Narrative:  CT HEAD WITHOUT CONTRAST  2/22/2023 3:54 AM    HISTORY/REASON FOR EXAM:   Altered mental status    TECHNIQUE/EXAM DESCRIPTION AND NUMBER OF VIEWS:  CT of the head without contrast.    The study was performed on a helical multidetector CT scanner. Contiguous 2.5 mm axial sections were obtained from the skull base through the vertex.    Up to date radiation dose reduction adjustments have been utilized to meet ALARA standards for radiation dose reduction.    COMPARISON:  7/6/2017    FINDINGS:  Lateral ventricles are normal in size and symmetric.  Mild global parenchymal atrophy. Chronic small vessel ischemic changes.  No significant mass effect or midline shift.  Basal cisterns are patent.  No evidence for intracranial hemorrhage.  Calvaria are intact.    Visualized orbits are unremarkable.  Visualized mastoid air cells are clear.  No significant sinus disease in the visualized paranasal sinuses.  Impression: 1. No CT evidence of acute infarct, hemorrhage or mass.  2. Mild global parenchymal atrophy. Chronic small vessel ischemic changes.        Pathology: none available    Labs:    Latest Reference Range & Units 02/22/23 06:09 02/22/23 11:50   WBC 4.8 - 10.8 K/uL 9.2 8.1   RBC 4.70 - 6.10 M/uL 2.41 (L) 3.02 (L)   Hemoglobin 14.0 - 18.0 g/dL 7.1 (L) 9.0 (L)   Hematocrit 42.0 - 52.0 % 21.2 (L) 27.0 (L)   MCV 81.4 - 97.8 fL 88.0 89.4   MCH 27.0 - 33.0 pg 29.5 29.8   MCHC 33.7 - 35.3 g/dL 33.5 (L) 33.3 (L)   RDW 35.9 - 50.0 fL 50.5 (H) 50.4 (H)   Platelet Count 164 - 446 K/uL 90 (L) 96 (L)   MPV 9.0 - 12.9 fL 10.5 10.5   Neutrophils-Polys 44.00 - 72.00 % 94.00 (H) 92.00 (H)   Neutrophils (Absolute) 1.82 - 7.42 K/uL 8.68 (H) 7.39   Lymphocytes 22.00 - 41.00 % 3.60 (L) 5.30 (L)   Lymphs (Absolute) 1.00 - 4.80 K/uL 0.33 (L) 0.43 (L)   Monocytes 0.00 - 13.40 % 1.50 1.70   Monos (Absolute) 0.00 - 0.85 K/uL 0.14 0.14   Eosinophils 0.00 - 6.90 % 0.00 0.20   Eos (Absolute) 0.00 - 0.51 K/uL 0.00 0.02   Basophils 0.00 - 1.80 % 0.10 0.10   Baso (Absolute) 0.00 - 0.12 K/uL 0.01 0.01   Immature  Granulocytes 0.00 - 0.90 % 0.80 0.70   Immature Granulocytes (abs) 0.00 - 0.11 K/uL 0.07 0.06   Nucleated RBC /100 WBC 0.30 0.70   NRBC (Absolute) K/uL 0.03 0.06   Sodium 135 - 145 mmol/L 138    Potassium 3.6 - 5.5 mmol/L 4.2    Chloride 96 - 112 mmol/L 109    Co2 20 - 33 mmol/L 18 (L)    Anion Gap 7.0 - 16.0  11.0    Glucose 65 - 99 mg/dL 153 (H)    Bun 8 - 22 mg/dL 50 (H)    Creatinine 0.50 - 1.40 mg/dL 0.77    GFR (CKD-EPI) >60 mL/min/1.73 m 2 95    Calcium 8.5 - 10.5 mg/dL 8.1 (L)    Correct Calcium 8.5 - 10.5 mg/dL 9.6    AST(SGOT) 12 - 45 U/L 44    ALT(SGPT) 2 - 50 U/L 50    Alkaline Phosphatase 30 - 99 U/L 486 (H)    Total Bilirubin 0.1 - 1.5 mg/dL 2.7 (H)    Albumin 3.2 - 4.9 g/dL 2.1 (L)    Total Protein 6.0 - 8.2 g/dL 5.2 (L)    Globulin 1.9 - 3.5 g/dL 3.1    A-G Ratio g/dL 0.7    Phosphorus 2.5 - 4.5 mg/dL 4.1    Magnesium 1.5 - 2.5 mg/dL 2.5    Lactic Acid 0.5 - 2.0 mmol/L 1.2    (L): Data is abnormally low  (H): Data is abnormally high     Assessment and Plan:   Patient is a as well as 72-year-old male with metastatic pancreas cancer who presented with acute blood loss anemia secondary to GI bleed as well as CT findings consistent with cholecystitis and 1 out of 2 blood cultures positive for gram-negative rods.  He has stabilized with resuscitation with blood and fluid and his vital signs are stable on antibiotics currently.  He has stability in his hemoglobin at this time.  On exam he is nontender does not endorse any ongoing right upper quadrant pain.    I discussed with patient his clinical picture.  Particularly with respect to his emphysematous cholecystitis.  I discussed with him that I do not think given his metastatic pancreas cancer he would be a good surgical candidate but we do have options for addressing his cholecystitis.  I explained to them the concept of a cholecystostomy tube as a means of both decompressing the gallbladder, allowing for source control of infection and for palliative  relief.  Patient and family are considering whether or not he would like to initiate hospice care and they are looking to speak with her medical oncology to see [facility] pending her decision regarding any further chemotherapy they are planning to make her decision in regards to hospice in the next few days.  Certainly explained that it is reasonable to make this decision and we can determine the necessity of a cholecystostomy tube based on that decision.  I did explain to them that even if they were to choose to go the hospice route he still may get symptomatic relief with the cholecystostomy tube given the dilation of his gallbladder.  The family would like to think about this and I told him I would come back in the morning to further discuss whether or not they would like to pursue this.    Plan:  -Continue antibiotics  - Continue to trend hemoglobin  - Continue Protonix drip  - Continue octreotide drip  - We will discuss with patient in the a.m. in regards to their desire to have a cholecystostomy tube placed    The patient and family asked several great questions which were answered to  their satisfaction.  They  are in agreement with the care plan as outlined above.      Yfn Schwarz MD  Surgical Oncology  February 22, 2023, 3:39 PM

## 2023-02-22 NOTE — ED PROVIDER NOTES
ER Provider Note    Scribed for Leighann Ellison M.d. by Cristine Salter. 2/21/2023  4:02 PM    Primary Care Provider: Brendan Hargrove M.D.    CHIEF COMPLAINT  Chief Complaint   Patient presents with    ALOC     EXTERNAL RECORDS REVIEWED  EMS run sheet Patient has a history of pancreatic history.    HPI/ROS  LIMITATION TO HISTORY   Select: Altered mental status / Confusion  OUTSIDE HISTORIAN(S):  EMS Patient has no advanced directive paperwork. EMS spoke to wife regarding the patient's case. She notes that the patient had a fever of 100.4 degrees last night.    Carl Finney is a 72 y.o. male, with a history of pancreatic cancer, who presents to the ED via EMS to bedside for sudden onset declining levels of consciousness over the past day. He was A&O x4 and at his normal baseline two days ago per wife. He is now jaundiced and altered. EMS was called by his wife. Per wife, the patient had a fever of 100.4 °F last night. When they arrived they could not find any advanced directive paperwork.  Hospice has not been set up on behalf of the patient. He is currently on Abraxane and gemcitabine for chemotherapy. En route patient had a BG of 168.     The patient's wife called at 4:37 PM and informed me and nursing staff that the patient was being seen at [facility] for his cancer treatment's last week. He was switched to a new chemotherapy regimen on Thursday and his mental status began to deteriorate on Saturday. She informs me that he is not A&O x1 at baseline, but he has been struggling with ongoing Jaundiced skin. She notes that he is also moderately constipated secondary to his Oxycodone, and his last bowel movement was last Thursday. He has not been taking any Laxatives. She reports that his temperature never peaked over 100 last night.      PAST MEDICAL HISTORY  Past Medical History:   Diagnosis Date    Arthritis     gout    Bowel habit changes     IBS    Cancer (HCC) 01/01/2016    prostate    Fall      "History of sepsis 07/01/2017    Jaundice     Renal disorder     kidney stones    Urinary incontinence        SURGICAL HISTORY  Past Surgical History:   Procedure Laterality Date    PERCUTANEOUS NEPHROSTOLITHOTOMY Left 8/28/2017    Procedure: PERCUTANEOUS NEPHROSTOLITHOTOMY;  Surgeon: Zaki Bustos M.D.;  Location: SURGERY [facility];  Service: Urology    CYSTOSCOPY STENT PLACEMENT Left 7/7/2017    Procedure: CYSTOSCOPY STENT PLACEMENT;  Surgeon: Hadley Garcia M.D.;  Location: SURGERY [facility];  Service:     RETROGRADES Right 7/7/2017    Procedure: RETROGRADES;  Surgeon: Hadley Garcia M.D.;  Location: SURGERY [facility];  Service:     INGUINAL HERNIA REPAIR Right 9/30/2016    Procedure: INGUINAL HERNIA REPAIR;  Surgeon: Lisette Leos M.D.;  Location: SURGERY [facility];  Service:     OTHER ABDOMINAL SURGERY  3/9/2016    prostatectomy, Dr [facility]    OTHER ORTHOPEDIC SURGERY  1993    ACL left knee       FAMILY HISTORY  None pertinent    SOCIAL HISTORY   reports that he has never smoked. He has never used smokeless tobacco. He reports that he does not drink alcohol and does not use drugs.    CURRENT MEDICATIONS  Previous Medications    OXYCODONE-ACETAMINOPHEN (PERCOCET) 5-325 MG TAB    Take 0.5-1 Tabs by mouth every four hours as needed.    OXYCODONE-ACETAMINOPHEN (PERCOCET) 5-325 MG TAB    Take 1-2 Tabs by mouth every four hours as needed.       ALLERGIES  Ceftriaxone    PHYSICAL EXAM  BP 91/55   Pulse (!) 115   Temp 37.2 °C (99 °F)   Resp 18   Ht 1.803 m (5' 11\")   Wt 79.8 kg (176 lb)   SpO2 97%   BMI 24.55 kg/m²   Constitutional: Jaundiced appearance. Alert in no apparent distress.  HENT: No signs of trauma, Bilateral external ears normal, Nose normal. Uvula midline.   Eyes: Pupils are equal and reactive, Conjunctiva normal, Non-icteric.   Neck: Normal range of motion, No tenderness, Supple, No stridor.   Lymphatic: No lymphadenopathy noted.   Cardiovascular: " Tachycardic rate and regular rhythm, no murmurs.   Thorax & Lungs: Normal breath sounds, No respiratory distress, No wheezing, No chest tenderness.   Abdomen: Bowel sounds normal, Soft, No tenderness, No peritoneal signs, No masses, No pulsatile masses.   Skin: Jaundice. Warm, Dry, No erythema, No rash.   Rectal: melena present  Back: No bony tenderness, No CVA tenderness.   Extremities: Intact distal pulses, No edema, No tenderness, No cyanosis.  Musculoskeletal: Good range of motion in all major joints. No tenderness to palpation or major deformities noted.   Neurologic: A&O x1. No Asterixis. Normal motor function, Normal sensory function, No focal deficits noted.   Psychiatric: Affect normal, Judgment normal, Mood normal.     DIAGNOSTIC STUDIES    Labs:   Results for orders placed or performed during the hospital encounter of 02/21/23   Lactic acid (lactate)   Result Value Ref Range    Lactic Acid 2.4 (H) 0.5 - 2.0 mmol/L   Lactic acid (lactate): Repeat if initial lactic acid result is greater than 2   Result Value Ref Range    Lactic Acid 5.1 (HH) 0.5 - 2.0 mmol/L   Comp Metabolic Panel   Result Value Ref Range    Sodium 136 135 - 145 mmol/L    Potassium 3.5 (L) 3.6 - 5.5 mmol/L    Chloride 105 96 - 112 mmol/L    Co2 18 (L) 20 - 33 mmol/L    Anion Gap 13.0 7.0 - 16.0    Glucose 134 (H) 65 - 99 mg/dL    Bun 58 (H) 8 - 22 mg/dL    Creatinine 0.93 0.50 - 1.40 mg/dL    Calcium 8.3 (L) 8.5 - 10.5 mg/dL    AST(SGOT) 54 (H) 12 - 45 U/L    ALT(SGPT) 61 (H) 2 - 50 U/L    Alkaline Phosphatase 520 (H) 30 - 99 U/L    Total Bilirubin 3.0 (H) 0.1 - 1.5 mg/dL    Albumin 2.4 (L) 3.2 - 4.9 g/dL    Total Protein 5.3 (L) 6.0 - 8.2 g/dL    Globulin 2.9 1.9 - 3.5 g/dL    A-G Ratio 0.8 g/dL   Urinalysis    Specimen: Urine   Result Value Ref Range    Color DK Yellow     Character Clear     Specific Gravity 1.015 <1.035    Ph 5.0 5.0 - 8.0    Glucose Negative Negative mg/dL    Ketones Negative Negative mg/dL    Protein Negative  Negative mg/dL    Bilirubin Moderate (A) Negative    Urobilinogen, Urine 0.2 Negative    Nitrite Negative Negative    Leukocyte Esterase Negative Negative    Occult Blood Negative Negative    Micro Urine Req see below    CORRECTED CALCIUM   Result Value Ref Range    Correct Calcium 9.6 8.5 - 10.5 mg/dL   ESTIMATED GFR   Result Value Ref Range    GFR (CKD-EPI) 87 >60 mL/min/1.73 m 2   CBC WITH DIFFERENTIAL   Result Value Ref Range    WBC 5.8 4.8 - 10.8 K/uL    RBC 1.24 (L) 4.70 - 6.10 M/uL    Hemoglobin 3.4 (LL) 14.0 - 18.0 g/dL    Hematocrit 10.4 (LL) 42.0 - 52.0 %    MCV 83.9 81.4 - 97.8 fL    MCH 27.4 27.0 - 33.0 pg    MCHC 32.7 (L) 33.7 - 35.3 g/dL    RDW 51.6 (H) 35.9 - 50.0 fL    Platelet Count 122 (L) 164 - 446 K/uL    MPV 10.2 9.0 - 12.9 fL    Neutrophils-Polys 88.90 (H) 44.00 - 72.00 %    Lymphocytes 6.20 (L) 22.00 - 41.00 %    Monocytes 3.30 0.00 - 13.40 %    Eosinophils 0.00 0.00 - 6.90 %    Basophils 0.20 0.00 - 1.80 %    Immature Granulocytes 1.40 (H) 0.00 - 0.90 %    Nucleated RBC 0.70 /100 WBC    Neutrophils (Absolute) 5.17 1.82 - 7.42 K/uL    Lymphs (Absolute) 0.36 (L) 1.00 - 4.80 K/uL    Monos (Absolute) 0.19 0.00 - 0.85 K/uL    Eos (Absolute) 0.00 0.00 - 0.51 K/uL    Baso (Absolute) 0.01 0.00 - 0.12 K/uL    Immature Granulocytes (abs) 0.08 0.00 - 0.11 K/uL    NRBC (Absolute) 0.04 K/uL   PROTHROMBIN TIME (INR)   Result Value Ref Range    PT 18.2 (H) 12.0 - 14.6 sec    INR 1.55 (H) 0.87 - 1.13   APTT   Result Value Ref Range    APTT 26.3 24.7 - 36.0 sec   COD (ADULT)   Result Value Ref Range    ABO Grouping Only AB     Rh Grouping Only NEG     Antibody Screen-Cod NEG     Component R       R99                 Red Cells, LR       [id number]   transfused   02/21/23   19:19      Product Type R99     Dispense Status transfused     Unit Number (Barcoded) [id number]     Product Code (Barcoded) M2497W21     Blood Type (Barcoded) 2800     Component R       R99                 Red Cells, LR        [id number]   issued       02/21/23   20:01      Product Type R99     Dispense Status issued     Unit Number (Barcoded) [id number]     Product Code (Barcoded) R0160J87     Blood Type (Barcoded) 2800    ABO Rh Confirm   Result Value Ref Range    ABO Rh Confirm AB NEG    Urinalysis    Specimen: Urine   Result Value Ref Range    Color DK Yellow     Character Clear     Specific Gravity 1.015 <1.035    Ph 5.0 5.0 - 8.0    Glucose Negative Negative mg/dL    Ketones Negative Negative mg/dL    Protein Negative Negative mg/dL    Bilirubin Small (A) Negative    Urobilinogen, Urine 0.2 Negative    Nitrite Negative Negative    Leukocyte Esterase Negative Negative    Occult Blood Negative Negative    Micro Urine Req see below    HGB (Hemoglobin) for 48 hours   Result Value Ref Range    Hemoglobin 5.5 (LL) 14.0 - 18.0 g/dL   Lactic Acid Every four hours after STAT order   Result Value Ref Range    Lactic Acid 3.8 (H) 0.5 - 2.0 mmol/L   Cortisol   Result Value Ref Range    Cortisol 40.2 (H) 0.0 - 23.0 ug/dL   Procalcitonin   Result Value Ref Range    Procalcitonin 15.00 (HH) <0.25 ng/mL   MRSA By PCR (Amp)    Specimen: Nares; Blood   Result Value Ref Range    MRSA by PCR Negative Negative   MAGNESIUM   Result Value Ref Range    Magnesium 2.4 1.5 - 2.5 mg/dL       Radiology:   DX-CHEST-PORTABLE (1 VIEW)   Final Result      Mild bibasilar atelectasis.      CT-ABDOMEN-PELVIS WITH    (Results Pending)   CT-HEAD W/O    (Results Pending)   The attending emergency physician has independently interpreted the diagnostic imaging associated with this visit and am waiting the final reading from the radiologist.   Preliminary interpretation is a follows: no pna      COURSE & MEDICAL DECISION MAKING         INITIAL ASSESSMENT, COURSE AND PLAN  Care Narrative: 72-year-old male with history of pancreatic cancer presents to the emergency department with altered mental status and tachycardia and hypotension.  Patient profoundly jaundiced at  this time.  No obvious asterixis.        HYDRATION: Based on the patient's presentation of Eldery ALOC, Sepsis, and Tachycardia the patient was given IV fluids. IV Hydration was used because oral hydration was not adequate alone. Upon recheck following hydration, the patient was mildly improved.    4:13 PM - Patient seen and examined at bedside. Discussed plan of care, including need for hospitalization due to signs of sepsis. Patient agrees to the plan of care. The patient will be resuscitated with 1L NS IV and medicated with Vancomycin and Merrem. Ordered for CT-abdomen-pelvis, DX-chest, Lactic acid, CBC with differential, CMP, UA, Urine culture and blood culture to evaluate his symptoms.     4:16 PM- Spoke with pharmacy regarding the patient's case. Given the patient's fever of 100.4 °F last night, altered mental status, and hypotension, they will administer meropenem 500mg.     4:37 PM- Spoke with the patient's wife (Bee Finney) at this time. She provided me with information on the patient's case which is noted in the HPI. Spoke regarding the plan of care and need for hospitalization. She agrees to this plan of care. Spoke with wife regarding DNR, and she denies any cardiopulmonary resuscitation efforts on behalf of the patient due to his chronic illness and age.    6:26 PM- Critical lab of HgB 3.4 informed to me by RN. Ordered for blood infusion at this time.    6:47 PM- Spoke with pharmacy regarding the patient's possible medication reactions at this time.     7:09 PM- Patient returned from CT profoundly tachycardic and complaining of pain, but can not specify where the pain is.    7:10 PM- Rectal exam performed now by ERP.    7:19 PM- I was called to bedside again. He is more hypotensive at this time at 68/38. Nursing staff called to ensure that the patient's blood is ready for infusion.    7:34 PM I discussed the patient's case and the above findings with Dr. Ellison (ICU) who agrees to hospitalize the  patient and take over the plan of care moving forward.     7:34 PM- Patient is currently tachycardic in the 140s, with full body tremors, and on a NRB at 15 L. He is currently receiving his blood infusion.    7:43 PM- I had a phone call with the patient's wife at this time. I informed her of the patient's critical condition and critical lab values. She has been made aware of his vital signs, blood transfusion, and the plan for hospitalization to the ICU for further care. She continues to reiterate the patient's DNR status to which I informed her that I understand and agree. She was given the opportunity to ask questions and voice concerns at this time.     7:50 PM- Updated Dr. Ellison (ICU) on the patient's case and my consult with the patient's wife at this time. She is at bedside and made aware of DNR status.    Unclear etiology of patient's septic shock present on admission however may be secondary to patient's recent chemotherapy and immunocompromisation    Patient given octreotide and Protonix given concern for GI bleed with his acute blood loss anemia.  Patient with melena on TRISH.  Patient with no vomiting while in emergency department.    The total critical care time on this patient is 75 minutes, resuscitating patient, speaking with admitting physician, and deciphering test results. This 75 minutes is exclusive of separately billable procedures.    DISPOSITION:  Patient will be hospitalized by Dr. Ellison in critical condition.    FINAL DIANGOSIS  1. Altered mental status, unspecified altered mental status type    2. Sepsis, due to unspecified organism, unspecified whether acute organ dysfunction present (HCC)    3. ABLA (acute blood loss anemia)    4. Gastrointestinal hemorrhage, unspecified gastrointestinal hemorrhage type         I, Cristine Salter (Nahid), am scribing for, and in the presence of, Charlie Gunderson M.D..    Electronically signed by: Cristine Moreira), 2/21/2023    Charlie CEDENO  OWEN Gunderson. personally performed the services described in this documentation, as scribed by Cristine Salter in my presence, and it is both accurate and complete.    The note accurately reflects work and decisions made by me.  Charlie Gunderson M.D.  2/21/2023  11:45 PM

## 2023-02-23 ENCOUNTER — APPOINTMENT (OUTPATIENT)
Dept: RADIOLOGY | Facility: MEDICAL CENTER | Age: 73
DRG: 871 | End: 2023-02-23
Attending: INTERNAL MEDICINE
Payer: MEDICARE

## 2023-02-23 LAB
ALBUMIN SERPL BCP-MCNC: 1.9 G/DL (ref 3.2–4.9)
ALBUMIN/GLOB SERPL: 0.9 G/DL
ALP SERPL-CCNC: 410 U/L (ref 30–99)
ALT SERPL-CCNC: 35 U/L (ref 2–50)
ANION GAP SERPL CALC-SCNC: 10 MMOL/L (ref 7–16)
AST SERPL-CCNC: 28 U/L (ref 12–45)
BACTERIA UR CULT: NORMAL
BASOPHILS # BLD AUTO: 0.1 % (ref 0–1.8)
BASOPHILS # BLD AUTO: 0.1 % (ref 0–1.8)
BASOPHILS # BLD: 0.01 K/UL (ref 0–0.12)
BASOPHILS # BLD: 0.02 K/UL (ref 0–0.12)
BILIRUB SERPL-MCNC: 2.6 MG/DL (ref 0.1–1.5)
BUN SERPL-MCNC: 42 MG/DL (ref 8–22)
CA-I SERPL-SCNC: 1.1 MMOL/L (ref 1.1–1.3)
CALCIUM ALBUM COR SERPL-MCNC: 9.3 MG/DL (ref 8.5–10.5)
CALCIUM SERPL-MCNC: 7.6 MG/DL (ref 8.5–10.5)
CHLORIDE SERPL-SCNC: 111 MMOL/L (ref 96–112)
CO2 SERPL-SCNC: 19 MMOL/L (ref 20–33)
CREAT SERPL-MCNC: 0.7 MG/DL (ref 0.5–1.4)
EOSINOPHIL # BLD AUTO: 0 K/UL (ref 0–0.51)
EOSINOPHIL # BLD AUTO: 0 K/UL (ref 0–0.51)
EOSINOPHIL NFR BLD: 0 % (ref 0–6.9)
EOSINOPHIL NFR BLD: 0 % (ref 0–6.9)
ERYTHROCYTE [DISTWIDTH] IN BLOOD BY AUTOMATED COUNT: 48.3 FL (ref 35.9–50)
ERYTHROCYTE [DISTWIDTH] IN BLOOD BY AUTOMATED COUNT: 48.3 FL (ref 35.9–50)
GFR SERPLBLD CREATININE-BSD FMLA CKD-EPI: 98 ML/MIN/1.73 M 2
GLOBULIN SER CALC-MCNC: 2.2 G/DL (ref 1.9–3.5)
GLUCOSE SERPL-MCNC: 99 MG/DL (ref 65–99)
GRAM STN SPEC: NORMAL
HCT VFR BLD AUTO: 24.9 % (ref 42–52)
HCT VFR BLD AUTO: 25.1 % (ref 42–52)
HGB BLD-MCNC: 10.1 G/DL (ref 14–18)
HGB BLD-MCNC: 8.6 G/DL (ref 14–18)
HGB BLD-MCNC: 8.7 G/DL (ref 14–18)
HGB BLD-MCNC: 9.2 G/DL (ref 14–18)
IMM GRANULOCYTES # BLD AUTO: 0.12 K/UL (ref 0–0.11)
IMM GRANULOCYTES # BLD AUTO: 0.17 K/UL (ref 0–0.11)
IMM GRANULOCYTES NFR BLD AUTO: 0.9 % (ref 0–0.9)
IMM GRANULOCYTES NFR BLD AUTO: 1.1 % (ref 0–0.9)
LYMPHOCYTES # BLD AUTO: 0.8 K/UL (ref 1–4.8)
LYMPHOCYTES # BLD AUTO: 0.85 K/UL (ref 1–4.8)
LYMPHOCYTES NFR BLD: 5.2 % (ref 22–41)
LYMPHOCYTES NFR BLD: 6.6 % (ref 22–41)
MAGNESIUM SERPL-MCNC: 2.1 MG/DL (ref 1.5–2.5)
MAGNESIUM SERPL-MCNC: 2.1 MG/DL (ref 1.5–2.5)
MCH RBC QN AUTO: 29.9 PG (ref 27–33)
MCH RBC QN AUTO: 30.1 PG (ref 27–33)
MCHC RBC AUTO-ENTMCNC: 34.3 G/DL (ref 33.7–35.3)
MCHC RBC AUTO-ENTMCNC: 34.9 G/DL (ref 33.7–35.3)
MCV RBC AUTO: 86.2 FL (ref 81.4–97.8)
MCV RBC AUTO: 87.2 FL (ref 81.4–97.8)
MONOCYTES # BLD AUTO: 0.47 K/UL (ref 0–0.85)
MONOCYTES # BLD AUTO: 0.64 K/UL (ref 0–0.85)
MONOCYTES NFR BLD AUTO: 3.6 % (ref 0–13.4)
MONOCYTES NFR BLD AUTO: 4.1 % (ref 0–13.4)
NEUTROPHILS # BLD AUTO: 11.51 K/UL (ref 1.82–7.42)
NEUTROPHILS # BLD AUTO: 13.81 K/UL (ref 1.82–7.42)
NEUTROPHILS NFR BLD: 88.8 % (ref 44–72)
NEUTROPHILS NFR BLD: 89.5 % (ref 44–72)
NRBC # BLD AUTO: 0.08 K/UL
NRBC # BLD AUTO: 0.13 K/UL
NRBC BLD-RTO: 0.6 /100 WBC
NRBC BLD-RTO: 0.8 /100 WBC
PHOSPHATE SERPL-MCNC: 2.1 MG/DL (ref 2.5–4.5)
PHOSPHATE SERPL-MCNC: 2.7 MG/DL (ref 2.5–4.5)
PLATELET # BLD AUTO: 107 K/UL (ref 164–446)
PLATELET # BLD AUTO: 110 K/UL (ref 164–446)
PMV BLD AUTO: 10.7 FL (ref 9–12.9)
PMV BLD AUTO: 10.8 FL (ref 9–12.9)
POTASSIUM SERPL-SCNC: 3.4 MMOL/L (ref 3.6–5.5)
POTASSIUM SERPL-SCNC: 3.5 MMOL/L (ref 3.6–5.5)
PROT SERPL-MCNC: 4.1 G/DL (ref 6–8.2)
RBC # BLD AUTO: 2.88 M/UL (ref 4.7–6.1)
RBC # BLD AUTO: 2.89 M/UL (ref 4.7–6.1)
SIGNIFICANT IND 70042: NORMAL
SIGNIFICANT IND 70042: NORMAL
SITE SITE: NORMAL
SITE SITE: NORMAL
SODIUM SERPL-SCNC: 140 MMOL/L (ref 135–145)
SOURCE SOURCE: NORMAL
SOURCE SOURCE: NORMAL
WBC # BLD AUTO: 13 K/UL (ref 4.8–10.8)
WBC # BLD AUTO: 15.4 K/UL (ref 4.8–10.8)

## 2023-02-23 PROCEDURE — 84132 ASSAY OF SERUM POTASSIUM: CPT

## 2023-02-23 PROCEDURE — C9113 INJ PANTOPRAZOLE SODIUM, VIA: HCPCS | Performed by: INTERNAL MEDICINE

## 2023-02-23 PROCEDURE — 87205 SMEAR GRAM STAIN: CPT

## 2023-02-23 PROCEDURE — 87070 CULTURE OTHR SPECIMN AEROBIC: CPT

## 2023-02-23 PROCEDURE — 87076 CULTURE ANAEROBE IDENT EACH: CPT

## 2023-02-23 PROCEDURE — 700111 HCHG RX REV CODE 636 W/ 250 OVERRIDE (IP): Performed by: RADIOLOGY

## 2023-02-23 PROCEDURE — 80053 COMPREHEN METABOLIC PANEL: CPT

## 2023-02-23 PROCEDURE — 99233 SBSQ HOSP IP/OBS HIGH 50: CPT | Performed by: SURGERY

## 2023-02-23 PROCEDURE — 85018 HEMOGLOBIN: CPT

## 2023-02-23 PROCEDURE — 700111 HCHG RX REV CODE 636 W/ 250 OVERRIDE (IP): Performed by: INTERNAL MEDICINE

## 2023-02-23 PROCEDURE — 87186 SC STD MICRODIL/AGAR DIL: CPT | Mod: 91

## 2023-02-23 PROCEDURE — 99152 MOD SED SAME PHYS/QHP 5/>YRS: CPT

## 2023-02-23 PROCEDURE — 84100 ASSAY OF PHOSPHORUS: CPT

## 2023-02-23 PROCEDURE — 700111 HCHG RX REV CODE 636 W/ 250 OVERRIDE (IP)

## 2023-02-23 PROCEDURE — 770022 HCHG ROOM/CARE - ICU (200)

## 2023-02-23 PROCEDURE — 83735 ASSAY OF MAGNESIUM: CPT

## 2023-02-23 PROCEDURE — 0F9430Z DRAINAGE OF GALLBLADDER WITH DRAINAGE DEVICE, PERCUTANEOUS APPROACH: ICD-10-PCS | Performed by: RADIOLOGY

## 2023-02-23 PROCEDURE — A9270 NON-COVERED ITEM OR SERVICE: HCPCS | Performed by: NURSE PRACTITIONER

## 2023-02-23 PROCEDURE — 85025 COMPLETE CBC W/AUTO DIFF WBC: CPT

## 2023-02-23 PROCEDURE — 700102 HCHG RX REV CODE 250 W/ 637 OVERRIDE(OP): Performed by: NURSE PRACTITIONER

## 2023-02-23 PROCEDURE — 99291 CRITICAL CARE FIRST HOUR: CPT | Performed by: INTERNAL MEDICINE

## 2023-02-23 PROCEDURE — 82330 ASSAY OF CALCIUM: CPT

## 2023-02-23 PROCEDURE — 700105 HCHG RX REV CODE 258: Performed by: INTERNAL MEDICINE

## 2023-02-23 PROCEDURE — 87077 CULTURE AEROBIC IDENTIFY: CPT | Mod: 91

## 2023-02-23 PROCEDURE — 87040 BLOOD CULTURE FOR BACTERIA: CPT | Mod: 91

## 2023-02-23 RX ORDER — CHOLECALCIFEROL (VITAMIN D3) 125 MCG
10 CAPSULE ORAL ONCE
Status: COMPLETED | OUTPATIENT
Start: 2023-02-23 | End: 2023-02-23

## 2023-02-23 RX ORDER — MIDAZOLAM HYDROCHLORIDE 1 MG/ML
.5-2 INJECTION INTRAMUSCULAR; INTRAVENOUS PRN
Status: ACTIVE | OUTPATIENT
Start: 2023-02-23 | End: 2023-02-23

## 2023-02-23 RX ORDER — OXYCODONE HYDROCHLORIDE 10 MG/1
10 TABLET ORAL
Status: ACTIVE | OUTPATIENT
Start: 2023-02-23 | End: 2023-02-24

## 2023-02-23 RX ORDER — OXYCODONE HYDROCHLORIDE 5 MG/1
5 TABLET ORAL
Status: ACTIVE | OUTPATIENT
Start: 2023-02-23 | End: 2023-02-24

## 2023-02-23 RX ORDER — ONDANSETRON 2 MG/ML
4 INJECTION INTRAMUSCULAR; INTRAVENOUS PRN
Status: ACTIVE | OUTPATIENT
Start: 2023-02-23 | End: 2023-02-23

## 2023-02-23 RX ORDER — SODIUM CHLORIDE 9 MG/ML
500 INJECTION, SOLUTION INTRAVENOUS
Status: ACTIVE | OUTPATIENT
Start: 2023-02-23 | End: 2023-02-23

## 2023-02-23 RX ORDER — MIDAZOLAM HYDROCHLORIDE 1 MG/ML
INJECTION INTRAMUSCULAR; INTRAVENOUS
Status: COMPLETED
Start: 2023-02-23 | End: 2023-02-23

## 2023-02-23 RX ADMIN — PANTOPRAZOLE SODIUM 8 MG/HR: 40 INJECTION, POWDER, LYOPHILIZED, FOR SOLUTION INTRAVENOUS at 16:01

## 2023-02-23 RX ADMIN — ONDANSETRON 4 MG: 2 INJECTION INTRAMUSCULAR; INTRAVENOUS at 19:55

## 2023-02-23 RX ADMIN — Medication 10 MG: at 00:57

## 2023-02-23 RX ADMIN — PIPERACILLIN AND TAZOBACTAM 3.38 G: 3; .375 INJECTION, POWDER, LYOPHILIZED, FOR SOLUTION INTRAVENOUS; PARENTERAL at 00:37

## 2023-02-23 RX ADMIN — PIPERACILLIN AND TAZOBACTAM 3.38 G: 3; .375 INJECTION, POWDER, LYOPHILIZED, FOR SOLUTION INTRAVENOUS; PARENTERAL at 09:10

## 2023-02-23 RX ADMIN — PANTOPRAZOLE SODIUM 8 MG/HR: 40 INJECTION, POWDER, LYOPHILIZED, FOR SOLUTION INTRAVENOUS at 05:23

## 2023-02-23 RX ADMIN — HYDROMORPHONE HYDROCHLORIDE 0.5 MG: 1 INJECTION, SOLUTION INTRAMUSCULAR; INTRAVENOUS; SUBCUTANEOUS at 11:27

## 2023-02-23 RX ADMIN — FENTANYL CITRATE 50 MCG: 50 INJECTION, SOLUTION INTRAMUSCULAR; INTRAVENOUS at 12:05

## 2023-02-23 RX ADMIN — PIPERACILLIN AND TAZOBACTAM 3.38 G: 3; .375 INJECTION, POWDER, LYOPHILIZED, FOR SOLUTION INTRAVENOUS; PARENTERAL at 16:03

## 2023-02-23 RX ADMIN — HYDROMORPHONE HYDROCHLORIDE 0.5 MG: 1 INJECTION, SOLUTION INTRAMUSCULAR; INTRAVENOUS; SUBCUTANEOUS at 19:55

## 2023-02-23 RX ADMIN — MIDAZOLAM HYDROCHLORIDE 1 MG: 1 INJECTION, SOLUTION INTRAMUSCULAR; INTRAVENOUS at 12:04

## 2023-02-23 RX ADMIN — FENTANYL CITRATE 50 MCG: 50 INJECTION, SOLUTION INTRAMUSCULAR; INTRAVENOUS at 12:09

## 2023-02-23 RX ADMIN — HYDROMORPHONE HYDROCHLORIDE 0.5 MG: 1 INJECTION, SOLUTION INTRAMUSCULAR; INTRAVENOUS; SUBCUTANEOUS at 15:48

## 2023-02-23 RX ADMIN — MIDAZOLAM HYDROCHLORIDE 1 MG: 1 INJECTION, SOLUTION INTRAMUSCULAR; INTRAVENOUS at 12:09

## 2023-02-23 RX ADMIN — SODIUM CHLORIDE, POTASSIUM CHLORIDE, SODIUM LACTATE AND CALCIUM CHLORIDE: 600; 310; 30; 20 INJECTION, SOLUTION INTRAVENOUS at 05:26

## 2023-02-23 ASSESSMENT — ENCOUNTER SYMPTOMS
BLOOD IN STOOL: 0
DEPRESSION: 0
ABDOMINAL PAIN: 1
EYE PAIN: 0
DOUBLE VISION: 0
NECK PAIN: 0
ORTHOPNEA: 0
WEIGHT LOSS: 1
PHOTOPHOBIA: 0
NAUSEA: 0
CONSTIPATION: 0
HALLUCINATIONS: 0
FLANK PAIN: 0
EYE DISCHARGE: 0
SENSORY CHANGE: 0
TINGLING: 0
BRUISES/BLEEDS EASILY: 0
WEAKNESS: 0
PALPITATIONS: 0
BLOOD IN STOOL: 1
CLAUDICATION: 0
SPEECH CHANGE: 0
HEMOPTYSIS: 0
MYALGIAS: 0
WEAKNESS: 1
CHILLS: 0
DIARRHEA: 0
FOCAL WEAKNESS: 0
FALLS: 0
NERVOUS/ANXIOUS: 0
TREMORS: 0
ABDOMINAL PAIN: 0
SHORTNESS OF BREATH: 0
BLURRED VISION: 0
FEVER: 0
COUGH: 0
WEIGHT LOSS: 0
DIZZINESS: 0
SPUTUM PRODUCTION: 0
SORE THROAT: 0
BACK PAIN: 0
VOMITING: 0
HEARTBURN: 0
HEADACHES: 0

## 2023-02-23 ASSESSMENT — PAIN DESCRIPTION - PAIN TYPE
TYPE: ACUTE PAIN

## 2023-02-23 ASSESSMENT — FIBROSIS 4 INDEX: FIB4 SCORE: 4.02

## 2023-02-23 ASSESSMENT — LIFESTYLE VARIABLES: SUBSTANCE_ABUSE: 0

## 2023-02-23 NOTE — CONSULTS
Date of Consultation:  2/22/2023    Patient: : Carl Fniney  MRN: [id number]    Referring Physician:  Lambert Stern D.O.     GI:DENIS Chu     Reason for Consultation: Acute GI bleeding    History of Present Illness:   History is obtained primarily through chart review as patient is tangential and unable to focus on conversation.  Attempted to call family but was unable to reach via telephone.      This is a pleasant 72-year-old male with a past medical history including gout, prostate cancer, hyperlipidemia, hypertension, pancreatic cancer with liver metastases who presented to [facility] for altered mental status.  Plan chart review, patient presented to [facility] 11/2022 for jaundice and abdominal pain.  At that time, his bilirubin was severely elevated at 17.7 with alk phos 295, AST, 33, ALT 58.  He underwent an ERCP with Dr. Danielle on 11/7/2022 with a placement of a 10 [place] 7 cm plastic biliary stent.  Cholangiogram at the time demonstrated a 3 cm stricture with an mid/distal CBD.  Subsequent EUS performed on 11/8/2022 showed a 60.3 mm x 15.6 mm hypoechoic heterogenous mass in the head of the pancreas.  Biopsies were obtained at that time and were positive for adenocarcinoma..  Liver enzymes not significantly improved after ERCP.  A repeat ERCP was then performed on 11/10/2022 with placement of a 10 mm x 6 cm fully covered metal stent.  He was referred to Dr. Villegas for oncology and was started on gemcitabine and Abraxane.    Prior to arrival, patient was noted to have altered mental status.  His normal baseline is AAO x4 but he had been oriented only to self per family additionally, patient had worsening nausea, vomiting, abdominal pain over the past 2 days prior to admission without relief with oxycodone.  Combination of these factors prompted them to call EMS.    In the emergency department, patient found to be hypotensive and tachycardic with concerns for  septic shock.  His hemoglobin returned at 3.4.  Given his immunocompromise status in addition to hemoglobin of 3.4 patient was started on vancomycin and meropenem for septic shock and treated with PRBCs for suspected hemorrhagic shock admitted to ICU for further care.  CT abdomen pelvis with findings of emphysematous cholecystitis with mild pneumobilia in the setting of metastatic pancreatic cancer.  Findings were discussed with patient's wife who confirmed that patient is DNR/DNI and wants patient to be comfortable.  She has reported that patient would not want any heroic acts  additionally, patient wife had been thinking about hospice but had not yet made a decision.  However, patient and wife have been exploring returning to [place] for physician assisted suicide option.  Palliative care is involved.    Tobacco use: Denies  Alcohol use: Denies  Illicit drug use: Denies    Last EGD: November 2022  Last colonoscopy: November 2022  NSAID/ASA use: Denies  Anticoagulation use: Denies      Past Medical History:   Diagnosis Date    History of sepsis 07/01/2017    Cancer (HCC) 01/01/2016    prostate    Arthritis     gout    Bowel habit changes     IBS    Fall     Jaundice     Renal disorder     kidney stones    Urinary incontinence          Past Surgical History:   Procedure Laterality Date    PERCUTANEOUS NEPHROSTOLITHOTOMY Left 8/28/2017    Procedure: PERCUTANEOUS NEPHROSTOLITHOTOMY;  Surgeon: Zaki Bustos M.D.;  Location: [facility];  Service: Urology    CYSTOSCOPY STENT PLACEMENT Left 7/7/2017    Procedure: CYSTOSCOPY STENT PLACEMENT;  Surgeon: Hadley Garcia M.D.;  Location: [facility];  Service:     RETROGRADES Right 7/7/2017    Procedure: RETROGRADES;  Surgeon: Hadley Garcia M.D.;  Location: [facility];  Service:     INGUINAL HERNIA REPAIR Right 9/30/2016    Procedure: INGUINAL HERNIA REPAIR;  Surgeon: Lisette Leos M.D.;  Location: [facility];   Service:     OTHER ABDOMINAL SURGERY  3/9/2016    prostatectomy, Dr [facility]    OTHER ORTHOPEDIC SURGERY  1993    ACL left knee       No family history on file.    Social History     Socioeconomic History    Marital status:    Tobacco Use    Smoking status: Never    Smokeless tobacco: Never   Vaping Use    Vaping Use: Never used   Substance and Sexual Activity    Alcohol use: No    Drug use: No       Review of systems:  Review of Systems   Unable to perform ROS: Mental acuity       Physical Exam:  Vitals:    02/22/23 1300 02/22/23 1400 02/22/23 1500 02/22/23 1600   BP: 103/59 98/60 103/60 96/55   Pulse: 80 69 63 62   Resp: 18 (!) 22 15 14   Temp:       TempSrc:       SpO2: 94% 93% 92% 92%   Weight:       Height:           Physical Exam  Vitals reviewed.   Constitutional:       Appearance: He is cachectic. He is ill-appearing.   HENT:      Head: Normocephalic.      Nose: Nose normal. No congestion.      Mouth/Throat:      Mouth: Mucous membranes are dry.      Pharynx: Oropharynx is clear. No oropharyngeal exudate.   Eyes:      General: No scleral icterus.     Extraocular Movements: Extraocular movements intact.      Conjunctiva/sclera: Conjunctivae normal.   Cardiovascular:      Rate and Rhythm: Normal rate and regular rhythm.      Pulses: Normal pulses.      Heart sounds: Normal heart sounds. No murmur heard.  Pulmonary:      Effort: Pulmonary effort is normal. No respiratory distress.      Breath sounds: Normal breath sounds.   Abdominal:      General: Abdomen is flat. Bowel sounds are normal. There is no distension.      Palpations: Abdomen is soft.      Tenderness: There is abdominal tenderness. There is no guarding.   Musculoskeletal:      Right lower leg: No edema.      Left lower leg: No edema.   Skin:     General: Skin is warm and dry.      Capillary Refill: Capillary refill takes less than 2 seconds.      Coloration: Skin is pale. Skin is not jaundiced.   Neurological:      General: No focal  deficit present.      Mental Status: He is alert. He is disoriented.   Psychiatric:         Attention and Perception: He is inattentive.         Speech: Speech is delayed and tangential.         Behavior: Behavior is cooperative.         Labs:  Recent Labs     02/21/23  1731 02/21/23  2224 02/22/23  0609 02/22/23  1150   WBC 5.8  --  9.2 8.1   RBC 1.24*  --  2.41* 3.02*   HEMOGLOBIN 3.4* 5.5* 7.1* 9.0*   HEMATOCRIT 10.4*  --  21.2* 27.0*   MCV 83.9  --  88.0 89.4   MCH 27.4  --  29.5 29.8   MCHC 32.7*  --  33.5* 33.3*   RDW 51.6*  --  50.5* 50.4*   PLATELETCT 122*  --  90* 96*   MPV 10.2  --  10.5 10.5     Recent Labs     02/21/23  1607 02/22/23  0609   SODIUM 136 138   POTASSIUM 3.5* 4.2   CHLORIDE 105 109   CO2 18* 18*   GLUCOSE 134* 153*   BUN 58* 50*     Recent Labs     02/21/23  2139   APTT 26.3   INR 1.55*       Recent Labs     02/21/23  1607 02/21/23  2139 02/22/23  0609   ASTSGOT 54*  --  44   ALTSGPT 61*  --  50   TBILIRUBIN 3.0*  --  2.7*   ALKPHOSPHAT 520*  --  486*   GLOBULIN 2.9  --  3.1   INR  --  1.55*  --          Imaging:  CT-ABDOMEN-PELVIS WITH  Addendum: Findings were discussed with Dr. Stern on 2/22/2023 6:28 AM.  Narrative: 2/22/2023 3:54 AM    HISTORY/REASON FOR EXAM:  Sepsis.    TECHNIQUE/EXAM DESCRIPTION:   CT scan of the abdomen and pelvis with contrast.    Contrast-enhanced helical scanning was obtained from the diaphragmatic domes through the pubic symphysis following the bolus administration of nonionic contrast without complication.    100 mL of Omnipaque 350 nonionic contrast was administered without complication.    Low dose optimization technique was utilized for this CT exam including automated exposure control and adjustment of the mA and/or kV according to patient size.    COMPARISON: None.    FINDINGS:    Lower chest: Atelectasis in the dependent lower lobes. Coronary calcifications.    Liver: Periportal edema. Moderate intrahepatic biliary dilatation. Multiple hyperdense  structures within the liver, some of them with associated low-attenuation halos. They measure up to 1.4 cm in the posterior right hepatic lobe. The majority of them   are located in the periphery of the liver. Mild pneumobilia.    Gallbladder: Distended, with thickened wall and air in the wall. Pericholecystic fat stranding and pericholecystic fluid.    Common bile duct: Dilated, measuring up to 1.3 cm.    Pancreas: Severely atrophic, with severe pancreatic ductal dilatation. Pancreatic duct measures 1 cm. A 3.2 x 2.7 cm mass in the pancreatic head with central 6 mm hyperdensity. It abuts the portal vein and SMV, and the results in mild narrowing of the   portal vein. It also abuts the hepatic artery.    Spleen: No mass.    Adrenals: No mass.    Kidneys: No suspicious mass lesions. Punctate nonobstructing left renal stone. No hydronephrosis.    Stomach, small bowel, colon: No bowel wall thickening or obstruction. Metallic stent in the lumen of the sigmoid colon. Colonic diverticulosis. Normal appendix.    Peritoneal cavity: Small volume ascites.    Lymph nodes: No enlarged nodes by size criteria.    Aorta: No aneurysm. Atherosclerosis.    Pelvic organs: Unremarkable.    Musculoskeletal structures: No acute fracture or destructive lesion.  Impression: 1.  Distended gallbladder with air in the gallbladder wall and pericholecystic fluid/fat stranding, highly suggestive of acute emphysematous cholecystitis.  2.  Pneumobilia, moderate biliary dilatation and CBD wall enhancement. A component of cholangitis is likely.  3.  A 3.2 cm pancreatic mass obstructing the CBD and pancreatic ducts and resulting in pancreatic atrophy.  4.  The mass abuts and narrows the portal vein. It also abuts the SMV and hepatic artery.  5.  Multiple hypoattenuation hepatic lesions. Some of them likely represents pancreatic cancer metastases, but some may represent small abscesses. They measure up to 1.4 cm. Consider evaluation with  contrast-enhanced abdominal MRI.  6.  Metallic stent, located in the lumen of the sigmoid colon.  7.  Punctate nonobstructing left nephrolithiasis.  8.  Small volume ascites.    Attempts to contact patient's provider regarding potentially critical findings were initiated on 2/22/2023 0558 AM.  CT-HEAD W/O  Narrative:  CT HEAD WITHOUT CONTRAST  2/22/2023 3:54 AM    HISTORY/REASON FOR EXAM:  Altered mental status    TECHNIQUE/EXAM DESCRIPTION AND NUMBER OF VIEWS:  CT of the head without contrast.    The study was performed on a helical multidetector CT scanner. Contiguous 2.5 mm axial sections were obtained from the skull base through the vertex.    Up to date radiation dose reduction adjustments have been utilized to meet ALARA standards for radiation dose reduction.    COMPARISON:  7/6/2017    FINDINGS:  Lateral ventricles are normal in size and symmetric.  Mild global parenchymal atrophy. Chronic small vessel ischemic changes.  No significant mass effect or midline shift.  Basal cisterns are patent.  No evidence for intracranial hemorrhage.  Calvaria are intact.    Visualized orbits are unremarkable.  Visualized mastoid air cells are clear.  No significant sinus disease in the visualized paranasal sinuses.  Impression: 1. No CT evidence of acute infarct, hemorrhage or mass.  2. Mild global parenchymal atrophy. Chronic small vessel ischemic changes.            Impressions:    Pancreatic cancer with liver metastases  Emphysematous cholecystitis with mild pneumobilia-plan for IR drain placement for palliative measures  Severe anemia requiring multiple units of PRBC transfusion-patient is unclear in history to determine upper or lower GI bleeding  Elevated alkaline phosphatase-in the setting of pancreatic cancer with metastasis to liver  Hemorrhagic shock-s/p multiple transfusions of PRBCs  Septic shock-patient immunocompromise secondary to chemotherapy regimen for pancreatic cancer, WBC 10.1, ANC 9  Goals of  care-attended to contact patient's wife personally but unable to establish contact.  Per chart review, patient is DNR/DNI and patient not would want any heroics to prolong his life.  Patient and wife also exploring returning to [place] for physician assisted suicide.  Palliative care involved        MDM:  This is a 72-year-old male presenting with severe anemia likely secondary to GI bleeding of unknown source.  Nursing reports rust colored stool but no further episodes of melena, bright red blood per rectum.  Patient is also unable to provide any further history to provide clarification regarding stools or emesis.  Attempted to call patient's wife to discuss further endoscopic evaluation and possible treatment but was unable to establish contact.  Aware that on chart review patient is DNR/DNI, aware that death is imminent, considering hospice and returning to [place] for physician assisted suicide.  Given patient's severely ill state, do not think the patient would tolerate bowel prep for colonoscopy in addition to being a high risk for anesthesia/endoscopy given the severity of his disease and overall medical status.  We will defer endoscopic evaluation at this time and further clarify with patient's wife once able to establish contact.    Recommendations:  Agree with IR drain placement for emphysematous cholecystitis for palliative measures.  Monitor H/H-transfuse as necessary  Palliative care recommendations.        This note was generated using voice recognition software which has a small chance of producing errors of grammar and possibly content. I have made every reasonable attempt to find and correct any obvious errors, but expect that some may not be found prior to finalization of this note.

## 2023-02-23 NOTE — PROGRESS NOTES
Critical Care Progress Note    Date of admission  2/21/2023    Chief Complaint  72 y.o. male admitted 2/21/2023 with metastatic pancreatic CA to liver now with septic shock. CT A/P with emphysematous cholecystitis, mild pneumobilia and possible pancreatic abscess?     Hospital Course  2/21 admitted to ICU    Interval Problem Update  Reviewed last 24 hour events:  No acute changes overnight  1L NC, sat >95%  Hgb stable in 8.7. s/p total 5units PRBC  HR in 60-70s  SBP in 90-100s, MAP >65  Remains off pressors.   Afebrile  On 2L NC, sat >92%  Creatinine 0.70, HCO3 19  On piptazo  Procalcitonin 15  Lactate 3.8  INR 1.5  Palliative care was consulted.   GI following  HPB surgery following    On protonix gtt  2/21 Bcx positive for GNB pending speciation    Review of Systems  Review of Systems   Constitutional:  Negative for fever and malaise/fatigue.   Respiratory:  Negative for shortness of breath.    Cardiovascular:  Negative for chest pain and leg swelling.   Gastrointestinal:  Positive for abdominal pain. Negative for diarrhea, nausea and vomiting.   Musculoskeletal:  Negative for back pain.   Neurological:  Negative for weakness and headaches.   Psychiatric/Behavioral:  The patient is not nervous/anxious.    All other systems reviewed and are negative.     Vital Signs for last 24 hours   Temp:  [36.6 °C (97.9 °F)-37.1 °C (98.8 °F)] 36.7 °C (98 °F)  Pulse:  [56-84] 60  Resp:  [7-39] 17  BP: ()/(50-69) 122/69  SpO2:  [91 %-98 %] 97 %    Hemodynamic parameters for last 24 hours       Respiratory Information for the last 24 hours       Physical Exam   Physical Exam  Vitals and nursing note reviewed.   Constitutional:       General: He is not in acute distress.     Appearance: He is ill-appearing. He is not toxic-appearing.   HENT:      Head: Normocephalic and atraumatic.      Mouth/Throat:      Mouth: Mucous membranes are moist.   Cardiovascular:      Rate and Rhythm: Normal rate and regular rhythm.      Pulses:  Normal pulses.      Heart sounds: Normal heart sounds. No murmur heard.  Pulmonary:      Effort: Pulmonary effort is normal. No respiratory distress.      Breath sounds: Normal breath sounds. No wheezing, rhonchi or rales.   Abdominal:      General: There is no distension.      Palpations: Abdomen is soft.      Tenderness: There is abdominal tenderness. There is no guarding.      Comments: Mild tenderness in epigastric and RUQ   Musculoskeletal:         General: No swelling or tenderness.      Cervical back: Neck supple.      Right lower leg: No edema.      Left lower leg: No edema.   Skin:     General: Skin is warm and dry.      Capillary Refill: Capillary refill takes less than 2 seconds.      Coloration: Skin is not jaundiced.   Neurological:      General: No focal deficit present.      Mental Status: He is alert and oriented to person, place, and time.      Cranial Nerves: No cranial nerve deficit.   Psychiatric:         Mood and Affect: Mood normal.         Behavior: Behavior normal.       Medications  Current Facility-Administered Medications   Medication Dose Route Frequency Provider Last Rate Last Admin    lactated ringers infusion   Intravenous Continuous Leighann Ellison M.D. 100 mL/hr at 02/23/23 0526 New Bag at 02/23/23 0526    ondansetron (ZOFRAN) syringe/vial injection 4 mg  4 mg Intravenous Q4HRS PRN Leighann Ellison M.D.        ondansetron (ZOFRAN ODT) dispertab 4 mg  4 mg Oral Q4HRS PRN Leighann Ellison M.D.        HYDROmorphone (Dilaudid) injection 0.5 mg  0.5 mg Intravenous Q HOUR PRN Leighann Ellison M.D.   0.5 mg at 02/23/23 1127    pantoprazole (Protonix) 80 mg in  mL Infusion  8 mg/hr Intravenous Continuous Leighann Ellison M.D. 25 mL/hr at 02/23/23 0523 8 mg/hr at 02/23/23 0523    lactated ringers infusion (BOLUS)  500 mL Intravenous Once PRN Leighann Ellison M.D.        norepinephrine (Levophed) 8 mg in 250 mL NS infusion (premix)  0-1 mcg/kg/min (Ideal) Intravenous Continuous  Leighann Ellison M.D.   Held at 02/21/23 2015    And    vasopressin (Vasostrict) 20 Units in  mL Infusion  0.03 Units/min Intravenous Continuous Leighann Ellison M.D.   Held at 02/21/23 2015    piperacillin-tazobactam (Zosyn) 3.375 g in  mL IVPB  3.375 g Intravenous Q8HR Leighann Ellison M.D. 25 mL/hr at 02/23/23 0910 3.375 g at 02/23/23 0910       Fluids    Intake/Output Summary (Last 24 hours) at 2/23/2023 1150  Last data filed at 2/23/2023 0800  Gross per 24 hour   Intake 3058.62 ml   Output 550 ml   Net 2508.62 ml         Laboratory          Recent Labs     02/21/23  1607 02/21/23  2139 02/22/23  0609 02/23/23  0643   SODIUM 136  --  138 140   POTASSIUM 3.5*  --  4.2 3.4*   CHLORIDE 105  --  109 111   CO2 18*  --  18* 19*   BUN 58*  --  50* 42*   CREATININE 0.93  --  0.77 0.70   MAGNESIUM  --  2.4 2.5 2.1   PHOSPHORUS  --   --  4.1 2.1*   CALCIUM 8.3*  --  8.1* 7.6*       Recent Labs     02/21/23  1607 02/22/23  0609 02/23/23  0643   ALTSGPT 61* 50 35   ASTSGOT 54* 44 28   ALKPHOSPHAT 520* 486* 410*   TBILIRUBIN 3.0* 2.7* 2.6*   GLUCOSE 134* 153* 99       Recent Labs     02/21/23  1607 02/21/23  1731 02/22/23  0609 02/22/23  1150 02/22/23  1740 02/23/23  0040 02/23/23  0643   WBC  --    < > 9.2   < > 10.1 13.0* 15.4*   NEUTSPOLYS  --    < > 94.00*   < > 89.90* 88.80* 89.50*   LYMPHOCYTES  --    < > 3.60*   < > 6.00* 6.60* 5.20*   MONOCYTES  --    < > 1.50   < > 3.20 3.60 4.10   EOSINOPHILS  --    < > 0.00   < > 0.00 0.00 0.00   BASOPHILS  --    < > 0.10   < > 0.20 0.10 0.10   ASTSGOT 54*  --  44  --   --   --  28   ALTSGPT 61*  --  50  --   --   --  35   ALKPHOSPHAT 520*  --  486*  --   --   --  410*   TBILIRUBIN 3.0*  --  2.7*  --   --   --  2.6*    < > = values in this interval not displayed.       Recent Labs     02/21/23  2139 02/21/23  2224 02/22/23  1740 02/23/23  0040 02/23/23  0643   RBC  --    < > 2.79* 2.88* 2.89*   HEMOGLOBIN  --    < > 8.5* 8.6* 8.7*   HEMATOCRIT  --    < > 24.2* 25.1*  24.9*   PLATELETCT  --    < > 99* 110* 107*   PROTHROMBTM 18.2*  --   --   --   --    APTT 26.3  --   --   --   --    INR 1.55*  --   --   --   --     < > = values in this interval not displayed.         Imaging  X-Ray:  I have personally reviewed the images and compared with prior images.    Assessment/Plan  * Septic shock (HCC)- (present on admission)  Assessment & Plan  This is Septic shock Present on admission  SIRS criteria identified on my evaluation include: Fever, with temperature greater than 101 deg F, Tachycardia, with heart rate greater than 90 BPM and Tachypnea, with respirations greater than 20 per minute  Indicators of septic shock include: Severe sepsis present, persistent hypotension after 30 ml/kg completed, and initial lactate level result is >= 4 mmol/L.   Sources is: likely intra-abdominal  Sepsis protocol initiated  Crystalloid Fluid Administration: Fluid resuscitation ordered per standard protocol - 30 mL/kg per current or ideal body weight  IV antibiotics as appropriate for source of sepsis  Reassessment: I have reassessed the patient's hemodynamic status    Likely combination of hemorrhagic shock and septic shock.   Immunocompromised host  Source: GI - emphysematous cholecystitis, pneumobilia, ? Pancreatic abscess in the setting of metastatic pancreatic CA and GNB bacteremia  Initial lactate 3.8, repeat is 1.2  Broad spectrum abx:  Zosyn   Pt was fluid resuscitated and blood resuscitated. Total 5U PRBCs  Pressors were ordered but never got started  Hepatobiliary surgery was consulted. Pt not surgical candidate, however pt elected for percutaneous jhonatan tube.   Repeat blood cultures today         Hemorrhagic shock (HCC)  Assessment & Plan  Upper and lower GI bleed, unclear reason for bleeding.   S/p total 4 units of PRBC, last Hgb 7.1  Shock resolved.   Decrease CBC to q8H      Emphysematous cholecystitis  Assessment & Plan  Evidence of emphysematous cholecystitis with mild pneumobilia, in  the setting of metastatic pancreatic CA  Likely the source for the GNB bacteremia and septic shock  Pt is not surgical candidate however pt elected for percutaneous jhonatan tube.   Hepatobiliary surgery is following, appreciate help.     Hypokalemia- (present on admission)  Assessment & Plan  Replete with 40meq of KCL  Keep K >4 and Mg >2    Goals of care, counseling/discussion- (present on admission)  Assessment & Plan  2/21 - Wife is stuck in [place], [place] due to snowstorm, but confirms that patient is a DNR/DNI and wants him to be comfortable.  She does want to try abx, blood, fluids to see if this helps, but is aware of the severity of his illness.    2/22 patient seems thinking of hospice not hasn't made decision. I was also reported that patient was thinking about going back to [place] for physician-assisted suicide option. Palliative care was consulted    Pancreatic cancer metastasized to liver (HCC)- (present on admission)  Assessment & Plan  Diagnosed in 11/2022  S/p biliary stent  S/p gemcitabine/abraxance on 2/16  Followed by [facility] Hem/Onc Dr. Villegas.       Acute respiratory failure with hypoxia (HCC)- (present on admission)  Assessment & Plan  Concerns related to severe sepsis/shock  O2 saturation goals > 92%  RT/O2 protocols  May benefit from HFNC  Pt is a confirmed DNI  Resolved    Acute encephalopathy- (present on admission)  Assessment & Plan  Resolved    Acute upper GI bleeding- (present on admission)  Assessment & Plan  Serial Hb every 6 hours  PPI bolus and drip  Conservative transfusion protocols for Hb<7  No hx of cirrhosis.    Acute blood loss anemia- (present on admission)  Assessment & Plan  Concern for upper GI bleeding  S/p 5 units pRBCs total  H/H q6h, will decrease to q8H  TEG is negative         VTE:  Contraindicated  Ulcer: PPI  Lines: None    I have performed a physical exam and reviewed and updated ROS and Plan today (2/23/2023). In review of yesterday's note (2/22/2023), there are no  changes except as documented above.     Discussed patient condition and risk of morbidity and/or mortality with RN, RT, Pharmacy, Charge nurse / hot rounds, and Patient  The patient remains critically ill.  Critical care time = 50 minutes in directly providing and coordinating critical care and extensive data review.  No time overlap and excludes procedures.

## 2023-02-23 NOTE — DOCUMENTATION QUERY
[facility]                                                                       Query Response Note      PATIENT:               ANITA HARRISON  ACCT #:                  [id number]  MRN:                     [id number]  :                      1950  ADMIT DATE:       2023 3:53 PM  DISCH DATE:          RESPONDING  PROVIDER #:        [id number]           QUERY TEXT:    Lactic acid 5.1 is noted in the  Lab Results.  Can a diagnosis be provided to support this finding?    The patient's clinical indicators include:   Lactic acid: 2.4, 5.1, 3.8  Risk Factors: sepsis, septic shock   Treatment: IVF, trend lactic acid    Thank you,  Birgit Zarate RN, BSN  Clinical   Connect via [employer]  Options provided:   -- Lactic acidosis   -- Other explanation, please specify   -- Finding of no clinical significance   -- Unable to determine      Query created by: Birgit Zarate on 2023 7:14 AM    RESPONSE TEXT:    Lactic acidosis          Electronically signed by:  ENRICO MCMULLEN MD 2023 4:34 PM

## 2023-02-23 NOTE — DIETARY
"Nutrition services: Day 2 of admit.  Carl Finney is a 72 y.o. male with admitting DX of septic shock.    Consult received for MST 2 with unsure weight loss in 3 months and BMI<19.     Attempted to see yesterday, was with staff and did not have time to round back. Attempted to see after rounds today, was getting impella placed and is now at IR for procedure.     Assessment:  Height: 188 cm (6' 2\")  Weight: 64.4 kg (141 lb 15.6 oz) - bed scale  Body mass index is 18.23 kg/m²., BMI classification: normal  Diet/Intake: NPO    Evaluation:   PMH of HTN, HLD, prostate cancer s/p prostatectomy, gout with recent dx of pancreatic cancer in November 2022. Pt is receiving chemotherapy #1 2/16 and noted liver mets. Per H&P N/V x 2 days PTA.   Pt went for cholecystectomy tube placement today, family has decided on no EGD/colonoscopy at this time, per rounds discussed low chance of GI bleed currently. Sepsis 2/2 GI source.   Palliative consult is pending.  Per ICU rounds, neuro is intact, pt is A/O x 4, 2 BMs in last day rust colored.   Per admit screen, pt reports unsure weight loss in 3 months. Per weight hx on file, pt previously at 158 lbs on 11/23/22 with 17 lb (10.8%) severe weight loss in 3 months exactly.   Pt observed, appears severely malnourished with severe muscle and severe fat wasting visible to temples, clavicals and triceps. Unable to see lower body muscle/fat tissue at this time.  Skin: No wounds or edema noted.   Labs: K 3.4, BUN 42, Corrected Ca 9.3, Alk Phos 410, totla bili 2.6, Phos 2.1,   Meds: dilaudid prn, LR IV, protonix drip  Last BM: 2/23 Type 5 with burgundy color    Malnutrition Risk: Severe malnutrition in the context of chronic illness r/t pancreatic cancer as evidenced by severe muscle and severe fat wasting, and 10.8% severe weight loss in 3 months.     Recommendations/Plan:  Diet per MD when appropriate. Will order Boost VHC with meals if within diet parameters once started to promote " adequate kcal and protein intake.  Monitor weight.  Consider Creon if pt continues with rust colored stools possibly r/t pancreatic insufficiency.     RD following.

## 2023-02-23 NOTE — PROGRESS NOTES
IR Nursing Note    Cholecystostomy Drain Placement by Dr. Joe assisted by Jalen MENDOZA, RUQ of Abdomen access site.  Procedure site was marked by MD and verified using imaging guidance.  Patient was placed in a supine position. Vitals were taken every 5 minutes and remained stable during procedure (see doc flow sheet for results).  CO2 waveform capnography was monitored and remained stable throughout procedure. 8 FR cholycystostomy drain placed RUQ of abdomen.  Drain sutured in place. Drain connected to gravity drainage bag. A gauze and medipore dressing was placed over surgical site.     Report given to Irina REYES; patient transported to R1 via IR RN monitored then transferred care to report RN.     Flexima APDL Drainage Catheter  REF: [id number]  LOT:[id number]  EXP: 08/23/2025

## 2023-02-23 NOTE — PROGRESS NOTES
12 hour chart check complete.     Monitor Summary  Rhythm: SB/SR  HR: 56-70s  Ectopy: PVC, occasional

## 2023-02-23 NOTE — PROGRESS NOTES
Gastroenterology Progress Note               Author:  DENIS Chu Date & Time Created: 2/23/2023 1:27 PM       Patient ID:  Name:             Carl Finney  YOB: 1950  Age:                 72 y.o.  male  MRN:               [id number]        Medical Decision Making, by Problem:  Active Hospital Problems    Diagnosis     Emphysematous cholecystitis [K81.0]     Hemorrhagic shock (HCC) [R57.8]     Septic shock (HCC) [A41.9, R65.21]     Acute blood loss anemia [D62]     Acute upper GI bleeding [K92.2]     Acute encephalopathy [G93.40]     Acute respiratory failure with hypoxia (HCC) [J96.01]     Transaminitis [R74.01]     Elevated alkaline phosphatase level [R74.8]     Pancreatic cancer metastasized to liver (HCC) [C25.9, C78.7]     Goals of care, counseling/discussion [Z71.89]     Hypokalemia [E87.6]            Presenting Chief Complaint:  History is obtained primarily through chart review as patient is tangential and unable to focus on conversation.  Attempted to call family but was unable to reach via telephone.        This is a pleasant 72-year-old male with a past medical history including gout, prostate cancer, hyperlipidemia, hypertension, pancreatic cancer with liver metastases who presented to [facility] for altered mental status.  Plan chart review, patient presented to [facility] 11/2022 for jaundice and abdominal pain.  At that time, his bilirubin was severely elevated at 17.7 with alk phos 295, AST, 33, ALT 58.  He underwent an ERCP with Dr. Danielle on 11/7/2022 with a placement of a 10 Spanish 7 cm plastic biliary stent.  Cholangiogram at the time demonstrated a 3 cm stricture with an mid/distal CBD.  Subsequent EUS performed on 11/8/2022 showed a 60.3 mm x 15.6 mm hypoechoic heterogenous mass in the head of the pancreas.  Biopsies were obtained at that time and were positive for adenocarcinoma..  Liver enzymes not significantly improved after  ERCP.  A repeat ERCP was then performed on 11/10/2022 with placement of a 10 mm x 6 cm fully covered metal stent.  He was referred to Dr. Villegas for oncology and was started on gemcitabine and Abraxane.     Prior to arrival, patient was noted to have altered mental status.  His normal baseline is AAO x4 but he had been oriented only to self per family additionally, patient had worsening nausea, vomiting, abdominal pain over the past 2 days prior to admission without relief with oxycodone.  Combination of these factors prompted them to call EMS.    In the emergency department, patient found to be hypotensive and tachycardic with concerns for septic shock.  His hemoglobin returned at 3.4.  Given his immunocompromise status in addition to hemoglobin of 3.4 patient was started on vancomycin and meropenem for septic shock and treated with PRBCs for suspected hemorrhagic shock admitted to ICU for further care.  CT abdomen pelvis with findings of emphysematous cholecystitis with mild pneumobilia in the setting of metastatic pancreatic cancer.  Findings were discussed with patient's wife who confirmed that patient is DNR/DNI and wants patient to be comfortable.  She has reported that patient would not want any heroic acts  additionally, patient wife had been thinking about hospice but had not yet made a decision.  However, patient and wife have been exploring returning to [place] for physician assisted suicide option.  Palliative care is involved.      Interval History:  2/23/2023: Patient awake, alert, sitting up in bed.  Patient pending IR drain placement today.  Denies nausea, vomiting, abdominal pain at this time.  Was able to get in contact with patient's wife.  She states that patient would not want EGD or colonoscopy and has declined offers for further evaluation in this respect.  She is agreeable to monitor patient's stools and hemodynamic status at this point.        Hospital Medications:  Current  Facility-Administered Medications   Medication Dose Frequency Provider Last Rate Last Admin    NS (BOLUS) infusion 500 mL  500 mL Once PRN Steven Joe M.D.        ondansetron (ZOFRAN) syringe/vial injection 4 mg  4 mg PRN Steven Joe M.D.        fentaNYL (SUBLIMAZE) injection 12.5-50 mcg  12.5-50 mcg PRN Steven Joe M.D.   50 mcg at 02/23/23 1209    midazolam (Versed) injection 0.5-2 mg  0.5-2 mg PRN Steven Joe M.D.   1 mg at 02/23/23 1209    lactated ringers infusion   Continuous Leighann Ellison M.D. 100 mL/hr at 02/23/23 0526 New Bag at 02/23/23 0526    ondansetron (ZOFRAN) syringe/vial injection 4 mg  4 mg Q4HRS PRN Leighann Ellison M.D.        ondansetron (ZOFRAN ODT) dispertab 4 mg  4 mg Q4HRS PRN Leighann Ellison M.D.        HYDROmorphone (Dilaudid) injection 0.5 mg  0.5 mg Q HOUR PRN Leighann Ellison M.D.   0.5 mg at 02/23/23 1127    pantoprazole (Protonix) 80 mg in  mL Infusion  8 mg/hr Continuous Leighann Ellison M.D. 25 mL/hr at 02/23/23 0523 8 mg/hr at 02/23/23 0523    lactated ringers infusion (BOLUS)  500 mL Once PRN Leighann Ellison M.D.        norepinephrine (Levophed) 8 mg in 250 mL NS infusion (premix)  0-1 mcg/kg/min (Ideal) Continuous Leighann Ellison M.D.   Held at 02/21/23 2015    And    vasopressin (Vasostrict) 20 Units in  mL Infusion  0.03 Units/min Continuous Leighann Ellison M.D.   Held at 02/21/23 2015    piperacillin-tazobactam (Zosyn) 3.375 g in  mL IVPB  3.375 g Q8HR Leighann Ellison M.D. 25 mL/hr at 02/23/23 0910 3.375 g at 02/23/23 0910   Last reviewed on 2/21/2023  8:34 PM by Desmond Kennedy       Review of Systems:  Review of Systems   Constitutional:  Positive for malaise/fatigue and weight loss. Negative for chills and fever.   HENT:  Negative for congestion and sore throat.    Respiratory:  Negative for cough and shortness of breath.    Cardiovascular:  Negative for chest pain and leg swelling.   Gastrointestinal:  Negative for  "abdominal pain, blood in stool, constipation, diarrhea, melena, nausea and vomiting.   Genitourinary:  Negative for dysuria and flank pain.   Musculoskeletal:  Negative for falls and myalgias.   Neurological:  Positive for weakness. Negative for dizziness and headaches.   Psychiatric/Behavioral:  Negative for substance abuse. The patient is not nervous/anxious.    All other systems reviewed and are negative.      Vital signs:  Weight/BMI: Body mass index is 18.23 kg/m².  /59   Pulse 74   Temp 36.7 °C (98 °F) (Temporal)   Resp 18   Ht 1.88 m (6' 2\")   Wt 64.4 kg (141 lb 15.6 oz)   SpO2 97%   Vitals:    02/23/23 1155 02/23/23 1200 02/23/23 1205 02/23/23 1215   BP: 118/78 (!) 116/93 120/69 101/59   Pulse: (!) 59 (!) 59 62 74   Resp: (!) 22 20 18 18   Temp:       TempSrc:       SpO2: 95% 96% 97% 97%   Weight:       Height:         Oxygen Therapy:  Pulse Oximetry: 97 %, O2 (LPM): 2, O2 Delivery Device: Nasal Cannula    Intake/Output Summary (Last 24 hours) at 2/23/2023 1327  Last data filed at 2/23/2023 0800  Gross per 24 hour   Intake 2862.16 ml   Output 550 ml   Net 2312.16 ml         Physical Exam:  Physical Exam  Vitals and nursing note reviewed.   Constitutional:       Appearance: He is cachectic. He is ill-appearing.   HENT:      Head: Normocephalic.      Comments: Temporal muscle wasting     Nose: Nose normal. No congestion.      Mouth/Throat:      Mouth: Mucous membranes are moist.      Pharynx: Oropharynx is clear. No oropharyngeal exudate.   Eyes:      General: No scleral icterus.     Extraocular Movements: Extraocular movements intact.      Conjunctiva/sclera: Conjunctivae normal.   Cardiovascular:      Rate and Rhythm: Normal rate and regular rhythm.      Pulses: Normal pulses.      Heart sounds: Normal heart sounds. No murmur heard.    No gallop.   Pulmonary:      Effort: Pulmonary effort is normal. No respiratory distress.      Breath sounds: Normal breath sounds. No wheezing.   Abdominal:      " General: Abdomen is flat. Bowel sounds are normal. There is no distension.      Palpations: Abdomen is soft.      Tenderness: There is no abdominal tenderness. There is no guarding.   Musculoskeletal:      Right lower leg: No edema.   Skin:     General: Skin is warm and dry.      Capillary Refill: Capillary refill takes less than 2 seconds.      Coloration: Skin is pale.   Neurological:      General: No focal deficit present.      Mental Status: He is alert. He is disoriented.      Motor: Weakness present.   Psychiatric:         Mood and Affect: Mood normal.         Behavior: Behavior normal.           Labs:  Recent Labs     02/21/23  1607 02/21/23  2139 02/22/23  0609 02/23/23  0643   SODIUM 136  --  138 140   POTASSIUM 3.5*  --  4.2 3.4*   CHLORIDE 105  --  109 111   CO2 18*  --  18* 19*   BUN 58*  --  50* 42*   CREATININE 0.93  --  0.77 0.70   MAGNESIUM  --  2.4 2.5 2.1   PHOSPHORUS  --   --  4.1 2.1*   CALCIUM 8.3*  --  8.1* 7.6*     Recent Labs     02/21/23  1607 02/22/23  0609 02/23/23  0643   ALTSGPT 61* 50 35   ASTSGOT 54* 44 28   ALKPHOSPHAT 520* 486* 410*   TBILIRUBIN 3.0* 2.7* 2.6*   GLUCOSE 134* 153* 99     Recent Labs     02/21/23  1607 02/21/23  1731 02/22/23  0609 02/22/23  1150 02/22/23  1740 02/23/23  0040 02/23/23  0643   WBC  --    < > 9.2   < > 10.1 13.0* 15.4*   NEUTSPOLYS  --    < > 94.00*   < > 89.90* 88.80* 89.50*   LYMPHOCYTES  --    < > 3.60*   < > 6.00* 6.60* 5.20*   MONOCYTES  --    < > 1.50   < > 3.20 3.60 4.10   EOSINOPHILS  --    < > 0.00   < > 0.00 0.00 0.00   BASOPHILS  --    < > 0.10   < > 0.20 0.10 0.10   ASTSGOT 54*  --  44  --   --   --  28   ALTSGPT 61*  --  50  --   --   --  35   ALKPHOSPHAT 520*  --  486*  --   --   --  410*   TBILIRUBIN 3.0*  --  2.7*  --   --   --  2.6*    < > = values in this interval not displayed.     Recent Labs     02/21/23  2139 02/21/23  2224 02/22/23  1740 02/23/23  0040 02/23/23  0643   RBC  --    < > 2.79* 2.88* 2.89*   HEMOGLOBIN  --    < > 8.5*  8.6* 8.7*   HEMATOCRIT  --    < > 24.2* 25.1* 24.9*   PLATELETCT  --    < > 99* 110* 107*   PROTHROMBTM 18.2*  --   --   --   --    APTT 26.3  --   --   --   --    INR 1.55*  --   --   --   --     < > = values in this interval not displayed.     Recent Results (from the past 24 hour(s))   PLATELET MAPPING WITH BASIC TEG    Collection Time: 02/22/23  5:40 PM   Result Value Ref Range    Reaction Time Initial-R 5.2 4.6 - 9.1 min    React Time Initial Hep 5.5 4.3 - 8.3 min    Clot Kinetics-K 0.9 0.8 - 2.1 min    Clot Angle-Angle 77.3 63.0 - 78.0 degrees    Maximum Clot Strength-MA 60.1 52.0 - 69.0 mm    TEG Functional Fibrinogen(MA) 48.3 (H) 15.0 - 32.0 mm    Lysis 30 minutes-LY30 0.0 0.0 - 2.6 %    % Inhibition ADP See Comment 0.0 - 17.0 %    % Inhibition AA See Comment 0.0 - 11.0 %    TEG Algorithm Link Algorithm    CBC WITH DIFFERENTIAL    Collection Time: 02/22/23  5:40 PM   Result Value Ref Range    WBC 10.1 4.8 - 10.8 K/uL    RBC 2.79 (L) 4.70 - 6.10 M/uL    Hemoglobin 8.5 (L) 14.0 - 18.0 g/dL    Hematocrit 24.2 (L) 42.0 - 52.0 %    MCV 86.7 81.4 - 97.8 fL    MCH 30.5 27.0 - 33.0 pg    MCHC 35.1 33.7 - 35.3 g/dL    RDW 48.4 35.9 - 50.0 fL    Platelet Count 99 (L) 164 - 446 K/uL    MPV 10.8 9.0 - 12.9 fL    Neutrophils-Polys 89.90 (H) 44.00 - 72.00 %    Lymphocytes 6.00 (L) 22.00 - 41.00 %    Monocytes 3.20 0.00 - 13.40 %    Eosinophils 0.00 0.00 - 6.90 %    Basophils 0.20 0.00 - 1.80 %    Immature Granulocytes 0.70 0.00 - 0.90 %    Nucleated RBC 0.80 /100 WBC    Neutrophils (Absolute) 9.09 (H) 1.82 - 7.42 K/uL    Lymphs (Absolute) 0.61 (L) 1.00 - 4.80 K/uL    Monos (Absolute) 0.32 0.00 - 0.85 K/uL    Eos (Absolute) 0.00 0.00 - 0.51 K/uL    Baso (Absolute) 0.02 0.00 - 0.12 K/uL    Immature Granulocytes (abs) 0.07 0.00 - 0.11 K/uL    NRBC (Absolute) 0.08 K/uL   CBC WITH DIFFERENTIAL    Collection Time: 02/23/23 12:40 AM   Result Value Ref Range    WBC 13.0 (H) 4.8 - 10.8 K/uL    RBC 2.88 (L) 4.70 - 6.10 M/uL     Hemoglobin 8.6 (L) 14.0 - 18.0 g/dL    Hematocrit 25.1 (L) 42.0 - 52.0 %    MCV 87.2 81.4 - 97.8 fL    MCH 29.9 27.0 - 33.0 pg    MCHC 34.3 33.7 - 35.3 g/dL    RDW 48.3 35.9 - 50.0 fL    Platelet Count 110 (L) 164 - 446 K/uL    MPV 10.7 9.0 - 12.9 fL    Neutrophils-Polys 88.80 (H) 44.00 - 72.00 %    Lymphocytes 6.60 (L) 22.00 - 41.00 %    Monocytes 3.60 0.00 - 13.40 %    Eosinophils 0.00 0.00 - 6.90 %    Basophils 0.10 0.00 - 1.80 %    Immature Granulocytes 0.90 0.00 - 0.90 %    Nucleated RBC 0.60 /100 WBC    Neutrophils (Absolute) 11.51 (H) 1.82 - 7.42 K/uL    Lymphs (Absolute) 0.85 (L) 1.00 - 4.80 K/uL    Monos (Absolute) 0.47 0.00 - 0.85 K/uL    Eos (Absolute) 0.00 0.00 - 0.51 K/uL    Baso (Absolute) 0.01 0.00 - 0.12 K/uL    Immature Granulocytes (abs) 0.12 (H) 0.00 - 0.11 K/uL    NRBC (Absolute) 0.08 K/uL   Comp Metabolic Panel (CMP)    Collection Time: 02/23/23  6:43 AM   Result Value Ref Range    Sodium 140 135 - 145 mmol/L    Potassium 3.4 (L) 3.6 - 5.5 mmol/L    Chloride 111 96 - 112 mmol/L    Co2 19 (L) 20 - 33 mmol/L    Anion Gap 10.0 7.0 - 16.0    Glucose 99 65 - 99 mg/dL    Bun 42 (H) 8 - 22 mg/dL    Creatinine 0.70 0.50 - 1.40 mg/dL    Calcium 7.6 (L) 8.5 - 10.5 mg/dL    AST(SGOT) 28 12 - 45 U/L    ALT(SGPT) 35 2 - 50 U/L    Alkaline Phosphatase 410 (H) 30 - 99 U/L    Total Bilirubin 2.6 (H) 0.1 - 1.5 mg/dL    Albumin 1.9 (L) 3.2 - 4.9 g/dL    Total Protein 4.1 (L) 6.0 - 8.2 g/dL    Globulin 2.2 1.9 - 3.5 g/dL    A-G Ratio 0.9 g/dL   MAGNESIUM    Collection Time: 02/23/23  6:43 AM   Result Value Ref Range    Magnesium 2.1 1.5 - 2.5 mg/dL   PHOSPHORUS    Collection Time: 02/23/23  6:43 AM   Result Value Ref Range    Phosphorus 2.1 (L) 2.5 - 4.5 mg/dL   CBC WITH DIFFERENTIAL    Collection Time: 02/23/23  6:43 AM   Result Value Ref Range    WBC 15.4 (H) 4.8 - 10.8 K/uL    RBC 2.89 (L) 4.70 - 6.10 M/uL    Hemoglobin 8.7 (L) 14.0 - 18.0 g/dL    Hematocrit 24.9 (L) 42.0 - 52.0 %    MCV 86.2 81.4 - 97.8 fL     MCH 30.1 27.0 - 33.0 pg    MCHC 34.9 33.7 - 35.3 g/dL    RDW 48.3 35.9 - 50.0 fL    Platelet Count 107 (L) 164 - 446 K/uL    MPV 10.8 9.0 - 12.9 fL    Neutrophils-Polys 89.50 (H) 44.00 - 72.00 %    Lymphocytes 5.20 (L) 22.00 - 41.00 %    Monocytes 4.10 0.00 - 13.40 %    Eosinophils 0.00 0.00 - 6.90 %    Basophils 0.10 0.00 - 1.80 %    Immature Granulocytes 1.10 (H) 0.00 - 0.90 %    Nucleated RBC 0.80 /100 WBC    Neutrophils (Absolute) 13.81 (H) 1.82 - 7.42 K/uL    Lymphs (Absolute) 0.80 (L) 1.00 - 4.80 K/uL    Monos (Absolute) 0.64 0.00 - 0.85 K/uL    Eos (Absolute) 0.00 0.00 - 0.51 K/uL    Baso (Absolute) 0.02 0.00 - 0.12 K/uL    Immature Granulocytes (abs) 0.17 (H) 0.00 - 0.11 K/uL    NRBC (Absolute) 0.13 K/uL   CORRECTED CALCIUM    Collection Time: 02/23/23  6:43 AM   Result Value Ref Range    Correct Calcium 9.3 8.5 - 10.5 mg/dL   ESTIMATED GFR    Collection Time: 02/23/23  6:43 AM   Result Value Ref Range    GFR (CKD-EPI) 98 >60 mL/min/1.73 m 2       Radiology Review:  CT-DRAIN-CHOLECTYSTOSTOMY   Final Result      1. CT guided cholecystostomy with placement of an 8-Upper sorbian locking loop catheter.      2. Before catheter removal, a cholecystogram with cholangiogram would be recommended to assure patency of the common duct. Likewise, a tracto-gram may also be helpful to make sure that the catheter tract has matured so that there is not intraperitoneal    bile leakage after catheter removal.      CT-ABDOMEN-PELVIS WITH   Final Result   Addendum (preliminary) 1 of 1   Findings were discussed with Dr. Stern on 2/22/2023 6:28 AM.      Final      1.  Distended gallbladder with air in the gallbladder wall and pericholecystic fluid/fat stranding, highly suggestive of acute emphysematous cholecystitis.   2.  Pneumobilia, moderate biliary dilatation and CBD wall enhancement. A component of cholangitis is likely.   3.  A 3.2 cm pancreatic mass obstructing the CBD and pancreatic ducts and resulting in pancreatic atrophy.   4.   The mass abuts and narrows the portal vein. It also abuts the SMV and hepatic artery.   5.  Multiple hypoattenuation hepatic lesions. Some of them likely represents pancreatic cancer metastases, but some may represent small abscesses. They measure up to 1.4 cm. Consider evaluation with contrast-enhanced abdominal MRI.   6.  Metallic stent, located in the lumen of the sigmoid colon.   7.  Punctate nonobstructing left nephrolithiasis.   8.  Small volume ascites.      Attempts to contact patient's provider regarding potentially critical findings were initiated on 2/22/2023 0558 AM.      CT-HEAD W/O   Final Result      1. No CT evidence of acute infarct, hemorrhage or mass.   2. Mild global parenchymal atrophy. Chronic small vessel ischemic changes.      DX-CHEST-PORTABLE (1 VIEW)   Final Result      Mild bibasilar atelectasis.            MDM (Data Review):   -Records reviewed and summarized in current documentation  -I personally reviewed and interpreted the laboratory results  -I personally reviewed the radiology images    Assessment/Recommendations:  Impressions:     Pancreatic cancer with liver metastases  Emphysematous cholecystitis with mild pneumobilia-plan for IR drain placement for palliative measures  Severe anemia requiring multiple units of PRBC transfusion-patient is unclear in history to determine upper or lower GI bleeding  Elevated alkaline phosphatase-in the setting of pancreatic cancer with metastasis to liver  Hemorrhagic shock-s/p multiple transfusions of PRBCs  Septic shock-patient immunocompromise secondary to chemotherapy regimen for pancreatic cancer, WBC 10.1, ANC 9  Goals of care-attended to contact patient's wife personally but unable to establish contact.  Per chart review, patient is DNR/DNI and patient not would want any heroics to prolong his life.  Patient and wife also exploring returning to [place] for physician assisted suicide.  Palliative care involved           MDM:  This is a  72-year-old male presenting with severe anemia likely secondary to GI bleeding of unknown source.  Nursing reports rust colored stool but no further episodes of melena, bright red blood per rectum.   Was able to contact with patient's wife.  She states that patient would not want EGD or colonoscopy and has declined offers for further evaluation in this respect.  She is agreeable to monitor patient's stools and hemodynamic status at this point as their goal is to get patient back to [place] to pursue hospice and physician assisted suicide.      Recommendations:  Monitor H/H-transfuse as necessary  Palliative care recommendations.       GI to sign off.  Please reconsult for any further questions or concerns        Core Quality Measures   Reviewed items::  Labs, Medications and Radiology reports reviewed

## 2023-02-23 NOTE — THERAPY
Occupational Therapy Contact Note    Patient Name: Carl Finney  Age:  72 y.o., Sex:  male  Medical Record #: [id number]  Today's Date: 2/23/2023    Discussed missed therapy with RN       02/23/23 0944   Interdisciplinary Plan of Care Collaboration   Collaboration Comments OT referral received. Attempted OT eval. Pt c/o feeling weak from NPO and requested to complete OT eval after jhonatan tube placed. Will attempt again.

## 2023-02-23 NOTE — OR SURGEON
Immediate Post- Operative Note        Findings: Infected Gallbladder      Procedure(s): CT guided Cholecystostomy tube Placement      Estimated Blood Loss: Less than 5 ml        Complications: None            2/23/2023     12:14 PM     Steven Joe M.D.

## 2023-02-23 NOTE — THERAPY
Physical Therapy Contact Note    Patient Name: Carl Finney  Age:  72 y.o., Sex:  male  Medical Record #: [id number]  Today's Date: 2/23/2023 02/23/23 1400   Interdisciplinary Plan of Care Collaboration   Collaboration Comments Pt evaluation acknowledged. Pt declined due to NPO.  Cholecystostomy tube Placement today.

## 2023-02-23 NOTE — CARE PLAN
The patient is Stable - Low risk of patient condition declining or worsening    Shift Goals  Clinical Goals: Hgb >7; SBP >90; MAP >65  Patient Goals: Stabilize and get back home; comfort  Family Goals: Visitation, phone calls    Progress made toward(s) clinical / shift goals:  Patient met all clinical goals as stated. Hgb remains above 7 and hemodynamic parameters stable throughout the day. Patient's family at bedside to visit and a productive conversation was had with the providers regarding a plan to get Mr. Finney back home.     Patient is not progressing towards the following goals:

## 2023-02-23 NOTE — CARE PLAN
The patient is Watcher - Medium risk of patient condition declining or worsening    Shift Goals  Clinical Goals: MAP > 65 mmHg, q6h Hgb/HCt  Patient Goals: Sleep  Family Goals: STEVEN    Progress made toward(s) clinical / shift goals:   Adequate comfort and no signs/symptoms of bleeding.     Patient is not progressing towards the following goals:  N/A

## 2023-02-24 PROBLEM — C25.9 PANCREATIC CANCER (HCC): Status: ACTIVE | Noted: 2023-02-24

## 2023-02-24 LAB
ALBUMIN SERPL BCP-MCNC: 1.9 G/DL (ref 3.2–4.9)
ALBUMIN/GLOB SERPL: 0.8 G/DL
ALP SERPL-CCNC: 400 U/L (ref 30–99)
ALT SERPL-CCNC: 27 U/L (ref 2–50)
ANION GAP SERPL CALC-SCNC: 8 MMOL/L (ref 7–16)
AST SERPL-CCNC: 19 U/L (ref 12–45)
BILIRUB SERPL-MCNC: 2.7 MG/DL (ref 0.1–1.5)
BUN SERPL-MCNC: 31 MG/DL (ref 8–22)
CALCIUM ALBUM COR SERPL-MCNC: 9.4 MG/DL (ref 8.5–10.5)
CALCIUM SERPL-MCNC: 7.7 MG/DL (ref 8.5–10.5)
CHLORIDE SERPL-SCNC: 111 MMOL/L (ref 96–112)
CO2 SERPL-SCNC: 21 MMOL/L (ref 20–33)
CREAT SERPL-MCNC: 0.67 MG/DL (ref 0.5–1.4)
GFR SERPLBLD CREATININE-BSD FMLA CKD-EPI: 99 ML/MIN/1.73 M 2
GLOBULIN SER CALC-MCNC: 2.3 G/DL (ref 1.9–3.5)
GLUCOSE SERPL-MCNC: 82 MG/DL (ref 65–99)
HGB BLD-MCNC: 9.1 G/DL (ref 14–18)
HGB BLD-MCNC: 9.6 G/DL (ref 14–18)
MAGNESIUM SERPL-MCNC: 2.1 MG/DL (ref 1.5–2.5)
PHOSPHATE SERPL-MCNC: 2.8 MG/DL (ref 2.5–4.5)
POTASSIUM SERPL-SCNC: 3.3 MMOL/L (ref 3.6–5.5)
PROT SERPL-MCNC: 4.2 G/DL (ref 6–8.2)
SODIUM SERPL-SCNC: 140 MMOL/L (ref 135–145)

## 2023-02-24 PROCEDURE — 99233 SBSQ HOSP IP/OBS HIGH 50: CPT | Performed by: INTERNAL MEDICINE

## 2023-02-24 PROCEDURE — 80053 COMPREHEN METABOLIC PANEL: CPT

## 2023-02-24 PROCEDURE — 97163 PT EVAL HIGH COMPLEX 45 MIN: CPT

## 2023-02-24 PROCEDURE — 700102 HCHG RX REV CODE 250 W/ 637 OVERRIDE(OP): Performed by: INTERNAL MEDICINE

## 2023-02-24 PROCEDURE — 85018 HEMOGLOBIN: CPT

## 2023-02-24 PROCEDURE — C9113 INJ PANTOPRAZOLE SODIUM, VIA: HCPCS | Performed by: INTERNAL MEDICINE

## 2023-02-24 PROCEDURE — A9270 NON-COVERED ITEM OR SERVICE: HCPCS | Performed by: INTERNAL MEDICINE

## 2023-02-24 PROCEDURE — 97166 OT EVAL MOD COMPLEX 45 MIN: CPT

## 2023-02-24 PROCEDURE — 700105 HCHG RX REV CODE 258: Performed by: INTERNAL MEDICINE

## 2023-02-24 PROCEDURE — 700111 HCHG RX REV CODE 636 W/ 250 OVERRIDE (IP): Performed by: INTERNAL MEDICINE

## 2023-02-24 PROCEDURE — 83735 ASSAY OF MAGNESIUM: CPT

## 2023-02-24 PROCEDURE — 770001 HCHG ROOM/CARE - MED/SURG/GYN PRIV*

## 2023-02-24 PROCEDURE — 99223 1ST HOSP IP/OBS HIGH 75: CPT | Performed by: HOSPITALIST

## 2023-02-24 PROCEDURE — 84100 ASSAY OF PHOSPHORUS: CPT

## 2023-02-24 RX ORDER — POTASSIUM CHLORIDE 20 MEQ/1
40 TABLET, EXTENDED RELEASE ORAL ONCE
Status: COMPLETED | OUTPATIENT
Start: 2023-02-24 | End: 2023-02-24

## 2023-02-24 RX ORDER — OXYCODONE HYDROCHLORIDE 5 MG/1
5-10 TABLET ORAL EVERY 4 HOURS PRN
Status: DISCONTINUED | OUTPATIENT
Start: 2023-02-24 | End: 2023-03-01 | Stop reason: HOSPADM

## 2023-02-24 RX ORDER — PANTOPRAZOLE SODIUM 40 MG/10ML
40 INJECTION, POWDER, LYOPHILIZED, FOR SOLUTION INTRAVENOUS 2 TIMES DAILY
Status: DISCONTINUED | OUTPATIENT
Start: 2023-02-24 | End: 2023-02-27

## 2023-02-24 RX ORDER — ACETAMINOPHEN 325 MG/1
650 TABLET ORAL EVERY 4 HOURS PRN
Status: DISCONTINUED | OUTPATIENT
Start: 2023-02-24 | End: 2023-03-01 | Stop reason: HOSPADM

## 2023-02-24 RX ADMIN — PANTOPRAZOLE SODIUM 40 MG: 40 INJECTION, POWDER, FOR SOLUTION INTRAVENOUS at 18:30

## 2023-02-24 RX ADMIN — ONDANSETRON 4 MG: 2 INJECTION INTRAMUSCULAR; INTRAVENOUS at 04:24

## 2023-02-24 RX ADMIN — PIPERACILLIN AND TAZOBACTAM 3.38 G: 3; .375 INJECTION, POWDER, LYOPHILIZED, FOR SOLUTION INTRAVENOUS; PARENTERAL at 08:40

## 2023-02-24 RX ADMIN — PIPERACILLIN AND TAZOBACTAM 3.38 G: 3; .375 INJECTION, POWDER, LYOPHILIZED, FOR SOLUTION INTRAVENOUS; PARENTERAL at 00:46

## 2023-02-24 RX ADMIN — HYDROMORPHONE HYDROCHLORIDE 0.5 MG: 1 INJECTION, SOLUTION INTRAMUSCULAR; INTRAVENOUS; SUBCUTANEOUS at 11:11

## 2023-02-24 RX ADMIN — SODIUM CHLORIDE, POTASSIUM CHLORIDE, SODIUM LACTATE AND CALCIUM CHLORIDE: 600; 310; 30; 20 INJECTION, SOLUTION INTRAVENOUS at 00:42

## 2023-02-24 RX ADMIN — POTASSIUM CHLORIDE 40 MEQ: 1500 TABLET, EXTENDED RELEASE ORAL at 10:45

## 2023-02-24 RX ADMIN — PIPERACILLIN AND TAZOBACTAM 3.38 G: 3; .375 INJECTION, POWDER, LYOPHILIZED, FOR SOLUTION INTRAVENOUS; PARENTERAL at 16:50

## 2023-02-24 RX ADMIN — PANTOPRAZOLE SODIUM 8 MG/HR: 40 INJECTION, POWDER, LYOPHILIZED, FOR SOLUTION INTRAVENOUS at 00:43

## 2023-02-24 RX ADMIN — ONDANSETRON 4 MG: 2 INJECTION INTRAMUSCULAR; INTRAVENOUS at 00:44

## 2023-02-24 RX ADMIN — HYDROMORPHONE HYDROCHLORIDE 0.5 MG: 1 INJECTION, SOLUTION INTRAMUSCULAR; INTRAVENOUS; SUBCUTANEOUS at 00:43

## 2023-02-24 RX ADMIN — HYDROMORPHONE HYDROCHLORIDE 0.5 MG: 1 INJECTION, SOLUTION INTRAMUSCULAR; INTRAVENOUS; SUBCUTANEOUS at 04:24

## 2023-02-24 RX ADMIN — HYDROMORPHONE HYDROCHLORIDE 0.5 MG: 1 INJECTION, SOLUTION INTRAMUSCULAR; INTRAVENOUS; SUBCUTANEOUS at 16:38

## 2023-02-24 ASSESSMENT — COGNITIVE AND FUNCTIONAL STATUS - GENERAL
TOILETING: A LITTLE
HELP NEEDED FOR BATHING: A LITTLE
MOVING TO AND FROM BED TO CHAIR: UNABLE
WALKING IN HOSPITAL ROOM: A LITTLE
SUGGESTED CMS G CODE MODIFIER DAILY ACTIVITY: CJ
STANDING UP FROM CHAIR USING ARMS: A LITTLE
CLIMB 3 TO 5 STEPS WITH RAILING: A LITTLE
DAILY ACTIVITIY SCORE: 21
DRESSING REGULAR LOWER BODY CLOTHING: A LITTLE
SUGGESTED CMS G CODE MODIFIER MOBILITY: CK
MOBILITY SCORE: 15
MOVING FROM LYING ON BACK TO SITTING ON SIDE OF FLAT BED: UNABLE

## 2023-02-24 ASSESSMENT — FIBROSIS 4 INDEX: FIB4 SCORE: 3.14

## 2023-02-24 ASSESSMENT — ENCOUNTER SYMPTOMS
PALPITATIONS: 0
BACK PAIN: 0
BLURRED VISION: 0
HEADACHES: 0
NAUSEA: 0
FEVER: 0
WEAKNESS: 0
DIARRHEA: 0
COUGH: 0
CHILLS: 0
SHORTNESS OF BREATH: 0
SORE THROAT: 0
DOUBLE VISION: 0
LOSS OF CONSCIOUSNESS: 0
DIZZINESS: 0
NERVOUS/ANXIOUS: 0
VOMITING: 0
ABDOMINAL PAIN: 1

## 2023-02-24 ASSESSMENT — GAIT ASSESSMENTS
DISTANCE (FEET): 20
DISTANCE (FEET): 50
DEVIATION: BRADYKINETIC;DECREASED BASE OF SUPPORT
GAIT LEVEL OF ASSIST: CONTACT GUARD ASSIST
ASSISTIVE DEVICE: FRONT WHEEL WALKER

## 2023-02-24 ASSESSMENT — PAIN DESCRIPTION - PAIN TYPE
TYPE: ACUTE PAIN

## 2023-02-24 ASSESSMENT — ACTIVITIES OF DAILY LIVING (ADL): TOILETING: INDEPENDENT

## 2023-02-24 NOTE — ASSESSMENT & PLAN NOTE
Acute metabolic/infectious  Continue supportive care  Cont to address infectious and metabolic issues  Minimize sedating medications  Mobilize    Seems to be at his baseline mentation

## 2023-02-24 NOTE — PROGRESS NOTES
Critical Care Progress Note    Date of admission  2/21/2023    Chief Complaint  72 y.o. male admitted 2/21/2023 with metastatic pancreatic CA to liver now with septic shock. CT A/P with emphysematous cholecystitis, mild pneumobilia and possible pancreatic abscess?     Hospital Course  2/21 admitted to ICU  2/21 s/p 5U PRBCs  2/23 s/p right cholecystotomy tube    Interval Problem Update  Reviewed last 24 hour events:  No acute changes overnight  S/p right cholecystotomy tube yesterday, post procedure was evaluated. No hemodynamics change.   Alert, oriented.   EGD is not planned as wife reportedly was hesitant   Pt did have any more hematemesis or hematochezia or melena.   Hgb stable in 8s.   1L NC, sat >95  HR in 60-70s  SBP in 90-100s, MAP >65. Not on pressor  Afebrile  Creatinine 0.70, HCO3 19  On piptazo.     2/21 blood cultures positive for Enterobacter cloacae, pending sensitivity   2/23 Repeated blood cultures negative to date.     Palliative care was consulted.   GI following  HPB surgery following    On protonix gtt  2/21 Bcx positive for GNB pending speciation    Review of Systems  Review of Systems   Constitutional:  Negative for fever and malaise/fatigue.   Respiratory:  Negative for shortness of breath.    Cardiovascular:  Negative for chest pain and leg swelling.   Gastrointestinal:  Positive for abdominal pain. Negative for diarrhea, nausea and vomiting.   Musculoskeletal:  Negative for back pain.   Neurological:  Negative for weakness and headaches.   Psychiatric/Behavioral:  The patient is not nervous/anxious.    All other systems reviewed and are negative.     Vital Signs for last 24 hours   Temp:  [36.4 °C (97.5 °F)-36.8 °C (98.3 °F)] 36.8 °C (98.3 °F)  Pulse:  [58-88] 73  Resp:  [11-30] 23  BP: (101-138)/(58-93) 118/70  SpO2:  [90 %-99 %] 92 %    Hemodynamic parameters for last 24 hours       Respiratory Information for the last 24 hours       Physical Exam   Physical Exam  Vitals and nursing note  reviewed.   Constitutional:       General: He is not in acute distress.     Appearance: He is ill-appearing. He is not toxic-appearing.   HENT:      Head: Normocephalic and atraumatic.      Mouth/Throat:      Mouth: Mucous membranes are moist.   Cardiovascular:      Rate and Rhythm: Normal rate and regular rhythm.      Pulses: Normal pulses.      Heart sounds: Normal heart sounds. No murmur heard.  Pulmonary:      Effort: Pulmonary effort is normal. No respiratory distress.      Breath sounds: Normal breath sounds. No wheezing, rhonchi or rales.   Abdominal:      General: There is no distension.      Palpations: Abdomen is soft.      Tenderness: There is abdominal tenderness. There is no guarding.      Comments: Mild tenderness in epigastric and RUQ   Musculoskeletal:         General: No swelling or tenderness.      Cervical back: Neck supple.      Right lower leg: No edema.      Left lower leg: No edema.   Skin:     General: Skin is warm and dry.      Capillary Refill: Capillary refill takes less than 2 seconds.      Coloration: Skin is not jaundiced.   Neurological:      General: No focal deficit present.      Mental Status: He is alert and oriented to person, place, and time.      Cranial Nerves: No cranial nerve deficit.   Psychiatric:         Mood and Affect: Mood normal.         Behavior: Behavior normal.       Medications  Current Facility-Administered Medications   Medication Dose Route Frequency Provider Last Rate Last Admin    pantoprazole (Protonix) injection 40 mg  40 mg Intravenous BID Lambert Stern D.O.        oxyCODONE immediate-release (ROXICODONE) tablet 5 mg  5 mg Oral Q3HRS PRN Steven Joe M.D.        oxyCODONE immediate release (ROXICODONE) tablet 10 mg  10 mg Oral Q3HRS PRN Steven Joe M.D.        lactated ringers infusion   Intravenous Continuous Lambert Stern D.O. 75 mL/hr at 02/24/23 0840 Rate Change at 02/24/23 0840    ondansetron (ZOFRAN) syringe/vial injection 4 mg  4 mg Intravenous  Q4HRS PRN Leighann Ellison M.D.   4 mg at 02/24/23 0424    ondansetron (ZOFRAN ODT) dispertab 4 mg  4 mg Oral Q4HRS PRN Leighann Ellison M.D.        HYDROmorphone (Dilaudid) injection 0.5 mg  0.5 mg Intravenous Q HOUR PRN Leighann Ellison M.D.   0.5 mg at 02/24/23 1111    lactated ringers infusion (BOLUS)  500 mL Intravenous Once PRN Leighann Ellison M.D.        piperacillin-tazobactam (Zosyn) 3.375 g in  mL IVPB  3.375 g Intravenous Q8HR Leighann Ellison M.D. 25 mL/hr at 02/24/23 0840 3.375 g at 02/24/23 0840       Fluids    Intake/Output Summary (Last 24 hours) at 2/24/2023 1121  Last data filed at 2/24/2023 1000  Gross per 24 hour   Intake 3192.45 ml   Output 2265 ml   Net 927.45 ml         Laboratory          Recent Labs     02/22/23  0609 02/23/23  0643 02/23/23  2158 02/24/23  0430   SODIUM 138 140  --  140   POTASSIUM 4.2 3.4* 3.5* 3.3*   CHLORIDE 109 111  --  111   CO2 18* 19*  --  21   BUN 50* 42*  --  31*   CREATININE 0.77 0.70  --  0.67   MAGNESIUM 2.5 2.1 2.1 2.1   PHOSPHORUS 4.1 2.1* 2.7 2.8   CALCIUM 8.1* 7.6*  --  7.7*       Recent Labs     02/22/23  0609 02/23/23  0643 02/24/23  0430   ALTSGPT 50 35 27   ASTSGOT 44 28 19   ALKPHOSPHAT 486* 410* 400*   TBILIRUBIN 2.7* 2.6* 2.7*   GLUCOSE 153* 99 82       Recent Labs     02/22/23  0609 02/22/23  1150 02/22/23  1740 02/23/23  0040 02/23/23  0643 02/24/23  0430   WBC 9.2   < > 10.1 13.0* 15.4*  --    NEUTSPOLYS 94.00*   < > 89.90* 88.80* 89.50*  --    LYMPHOCYTES 3.60*   < > 6.00* 6.60* 5.20*  --    MONOCYTES 1.50   < > 3.20 3.60 4.10  --    EOSINOPHILS 0.00   < > 0.00 0.00 0.00  --    BASOPHILS 0.10   < > 0.20 0.10 0.10  --    ASTSGOT 44  --   --   --  28 19   ALTSGPT 50  --   --   --  35 27   ALKPHOSPHAT 486*  --   --   --  410* 400*   TBILIRUBIN 2.7*  --   --   --  2.6* 2.7*    < > = values in this interval not displayed.       Recent Labs     02/21/23  2139 02/21/23  2224 02/22/23  1740 02/23/23  0040 02/23/23  0643 02/23/23  1350  02/23/23  2158 02/24/23  0430   RBC  --    < > 2.79* 2.88* 2.89*  --   --   --    HEMOGLOBIN  --    < > 8.5* 8.6* 8.7* 9.2* 10.1* 9.1*   HEMATOCRIT  --    < > 24.2* 25.1* 24.9*  --   --   --    PLATELETCT  --    < > 99* 110* 107*  --   --   --    PROTHROMBTM 18.2*  --   --   --   --   --   --   --    APTT 26.3  --   --   --   --   --   --   --    INR 1.55*  --   --   --   --   --   --   --     < > = values in this interval not displayed.         Imaging  X-Ray:  I have personally reviewed the images and compared with prior images.    Assessment/Plan  * Septic shock (HCC)- (present on admission)  Assessment & Plan  This is Septic shock Present on admission  SIRS criteria identified on my evaluation include: Fever, with temperature greater than 101 deg F, Tachycardia, with heart rate greater than 90 BPM and Tachypnea, with respirations greater than 20 per minute  Indicators of septic shock include: Severe sepsis present, persistent hypotension after 30 ml/kg completed, and initial lactate level result is >= 4 mmol/L.   Sources is: likely intra-abdominal  Sepsis protocol initiated  Crystalloid Fluid Administration: Fluid resuscitation ordered per standard protocol - 30 mL/kg per current or ideal body weight  IV antibiotics as appropriate for source of sepsis  Reassessment: I have reassessed the patient's hemodynamic status    Likely combination of hemorrhagic shock and septic shock.   Immunocompromised host  Source: GI - emphysematous cholecystitis, pneumobilia, ? Pancreatic abscess in the setting of metastatic pancreatic CA and GNB bacteremia  S/p right cholecystotomy tube. Pt not surgical candidate and pt does not want anything invasive.   Initial lactate 3.8, repeat is 1.2  Broad spectrum abx:  Zosyn   Pt was fluid resuscitated and blood resuscitated. Total 5U PRBCs  Blood cultures + Enterobacter. 2/23 repeated blood cultures pending.     Hemorrhagic shock (HCC)  Assessment & Plan  Upper and lower GI bleed, unclear  reason for bleeding. Possibly related to cancer, but DDx include PUD, esophagitis/gastritis/duodenitis  S/p total 4 units of PRBC, last Hgb 7.1  Shock resolved.   Bleeding seems to have stopped.   Hgb has been stable in 8s.   Decrease CBC to q12H      Emphysematous cholecystitis  Assessment & Plan  Evidence of emphysematous cholecystitis with mild pneumobilia, in the setting of metastatic pancreatic CA  Likely the source for the GNB bacteremia and septic shock  Pt is not surgical candidate however pt elected for percutaneous jhonatan tube.   S/p right perchole tube on 2/23  Hepatobiliary surgery is following, appreciate help.     Hypokalemia- (present on admission)  Assessment & Plan  Replete with 40meq of KCL  Keep K >4 and Mg >2    Goals of care, counseling/discussion- (present on admission)  Assessment & Plan  2/21 - Wife is stuck in [place], [place] due to snowstorm, but confirms that patient is a DNR/DNI and wants him to be comfortable.  She does want to try abx, blood, fluids to see if this helps, but is aware of the severity of his illness.    2/22 patient seems thinking of hospice not hasn't made decision. I was also reported that patient was thinking about going back to [place] for physician-assisted suicide option. Palliative care was consulted. Pt okay with cholecystotomy tube    Pancreatic cancer metastasized to liver (HCC)- (present on admission)  Assessment & Plan  Diagnosed in 11/2022  S/p biliary stent  S/p gemcitabine/abraxance on 2/16  Followed by  [facility] Dr. Villegas.       Acute respiratory failure with hypoxia (HCC)- (present on admission)  Assessment & Plan  Concerns related to severe sepsis/shock  O2 saturation goals > 92%  RT/O2 protocols  Pt is a confirmed DNI  Resolved    Acute encephalopathy- (present on admission)  Assessment & Plan  Resolved    Acute upper GI bleeding- (present on admission)  Assessment & Plan  Serial Hb every 6 hours  PPI bolus and drip  De-escalate to protonix  BID  Conservative transfusion protocols for Hb<7  No hx of cirrhosis.  GI was consulted.   Wife/patient at this time would like to hold endoscopy  Will start full liquid    Acute blood loss anemia- (present on admission)  Assessment & Plan  Concern for upper GI bleeding  S/p 5 units pRBCs total  Serial H/H Q12h  TEG is negative         VTE:  Contraindicated  Ulcer: PPI  Lines: None    I have performed a physical exam and reviewed and updated ROS and Plan today (2/24/2023). In review of yesterday's note (2/23/2023), there are no changes except as documented above.     Discussed patient condition and risk of morbidity and/or mortality with RN, RT, Pharmacy, Charge nurse / hot rounds, and Patient    Can be transferred to medical floor  D/w Dr. Herron, [facility]  Will sign off, please call with questions.

## 2023-02-24 NOTE — ASSESSMENT & PLAN NOTE
Clinically appears to have stopped bleeding - h/h remains stable  Cont IV PPI  Continue to follow  Discussed with GI: pt is very high risk for intervention.  They feel medical management is the best approach at this time

## 2023-02-24 NOTE — ASSESSMENT & PLAN NOTE
Now s/p cholecystostomy tube 2/23  Cultures showing enterobacter cloacae from blood: follow to final ID and sensitivities  Currently on Pip/Tazo

## 2023-02-24 NOTE — ASSESSMENT & PLAN NOTE
"Clinically looks like an UGIB  Clinically appears to have stopped bleeding  Cont IV PPI  Cont serial H&H  DW GI: pt is very high risk for intervention.  They feel medical management is the best approach at this time\"    Recent Labs     02/26/23  0406 02/26/23  1815 02/27/23  0340   HEMOGLOBIN 8.7* 8.3* 8.3*   HEMATOCRIT 26.5* 25.0* 25.0*   MCV 90.1 89.0 89.3   MCH 29.6 29.5 29.6   PLATELETCT 178 208 215     Stable  No active bleeding  GI f/u at some point.  "

## 2023-02-24 NOTE — CARE PLAN
The patient is Watcher - Medium risk of patient condition declining or worsening    Shift Goals  Clinical Goals: MAP > 65 mmHg, q 8h Hgb  Patient Goals: Control pain and go home with fmily  Family Goals: Vistation and be kept informed on the patient's condition    Progress made toward(s) clinical / shift goals: Patient will continue to progress towards care plan goals.    Problem: Pain - Standard  Goal: Alleviation of pain or a reduction in pain to the patient’s comfort goal  Outcome: Progressing     Problem: Knowledge Deficit - Standard  Goal: Patient and family/care givers will demonstrate understanding of plan of care, disease process/condition, diagnostic tests and medications  Outcome: Progressing     Problem: Hemodynamics  Goal: Patient's hemodynamics, fluid balance and neurologic status will be stable or improve  Outcome: Progressing     Problem: Fluid Volume  Goal: Fluid volume balance will be maintained  Outcome: Progressing     Problem: Urinary - Renal Perfusion  Goal: Ability to achieve and maintain adequate renal perfusion and functioning will improve  Outcome: Progressing     Problem: Respiratory  Goal: Patient will achieve/maintain optimum respiratory ventilation and gas exchange  Outcome: Progressing     Problem: Physical Regulation  Goal: Diagnostic test results will improve  Outcome: Progressing  Goal: Signs and symptoms of infection will decrease  Outcome: Progressing     Problem: Skin Integrity  Goal: Skin integrity is maintained or improved  Outcome: Progressing     Problem: Provide Safe Environment  Goal: Suicide environmental safety, protocols, policies, and practices will be implemented  Outcome: Progressing     Problem: Psychosocial  Goal: Patient's ability to identify and develop effective coping behaviors will improve  Outcome: Progressing  Goal: Patient's ability to identify and utilize available support systems will improve  Outcome: Progressing     Problem: Fall Risk  Goal: Patient will  remain free from falls  Outcome: Progressing       Patient is not progressing towards the following goals:

## 2023-02-24 NOTE — CONSULTS
Hospital Medicine Consultation    Date of Service  2/24/2023    Referring Physician  ANIVAL Stern    Consulting Physician  Syd Brown D.O.    Reason for Consultation  Assuming medical management    History of Presenting Illness  72 y.o. male who presented 2/21/2023 with previous medical history that includes hypertension, dyslipidemia, prostate cancer s/p prostatectomy, gout, recently diagnosed pancreatic cancer with mets to the liver in November of last year, s/p ERCP and biliary stent placement.  He was started on chemotherapy with gemcitabine and Abraxane by Dr. Villegas at [facility].  Patient presented for evaluation on February 21 with complaint of abdominal pain and nausea and vomiting over the previous 48 hours.  His emesis had been bloody.  In the ED was hypotensive and tachycardic with signs of hypoperfusion and diagnosed with shock.  His hemoglobin came back at 3.4.  Patient was transfused in the ED and admitted to the ICU with diagnosis of mixed shock from sepsis and GI bleed.  General surgery Dr. Oliver and gastroenterology Dr. Mike were consulted.    In the ICU CT was done which demonstrated evidence of cholecystitis.  He was started empirically on meropenem, and IR placed a cholecystostomy tube on February 23rd.  Blood cultures done on February 21 came back 1 of 2 positive for Enterobacter cloacae.  Subsequent cultures from blood and from the cholecystostomy tube and blood have been negative.    Patient's CODE STATUS is DNR/I    DW Dr Villegas [phone]: pt has follow up appointment with her in March.  She would like to see him in person before deciding on continued chemo    DW pt's wife by phone    Review of Systems  Review of Systems   Constitutional:  Positive for malaise/fatigue. Negative for chills and fever.   HENT:  Negative for nosebleeds and sore throat.    Eyes:  Negative for blurred vision and double vision.   Respiratory:  Negative for cough and shortness of breath.    Cardiovascular:  " Negative for chest pain, palpitations and leg swelling.   Gastrointestinal:  Positive for abdominal pain. Negative for diarrhea, nausea and vomiting.   Genitourinary:  Negative for dysuria and urgency.   Musculoskeletal:  Negative for back pain.   Skin:  Negative for rash.   Neurological:  Negative for dizziness, loss of consciousness and headaches.     Past Medical History   has a past medical history of Arthritis, Bowel habit changes, Cancer (HCC) (01/01/2016), Fall, History of sepsis (07/01/2017), Jaundice, Renal disorder, and Urinary incontinence.    Surgical History   has a past surgical history that includes other abdominal surgery (3/9/2016); other orthopedic surgery (1993); inguinal hernia repair (Right, 9/30/2016); cystoscopy stent placement (Left, 7/7/2017); retrogrades (Right, 7/7/2017); and percutaneous nephrostolithotomy (Left, 8/28/2017).    Family History  family history is not on file.    Social History   reports that he has never smoked. He has never used smokeless tobacco. He reports that he does not drink alcohol and does not use drugs.    Medications  Prior to Admission Medications   Prescriptions Last Dose Informant Patient Reported? Taking?   NON SPECIFIED UNK at PRN Significant Other Yes Yes   Sig: Take 1 Tablet by mouth 1 time a day as needed.   calcium carbonate (TUMS) 500 MG Chew Tab UNK at PRN Significant Other Yes Yes   Sig: Chew 500 mg as needed.   ondansetron (ZOFRAN) 8 MG Tab \"FEW DAYS AGO\" at PRN Significant Other Yes Yes   Sig: Take 8 mg by mouth every 8 hours as needed.   oxyCODONE immediate release (ROXICODONE) 10 MG immediate release tablet 2/20/2023 at 2300 Significant Other Yes No   Sig: Take 10 mg by mouth every four hours as needed.      Facility-Administered Medications: None       Allergies  Allergies   Allergen Reactions    Ceftriaxone Unspecified     Unable to speak and high fever  RXN=7/6/17       Physical Exam  Temp:  [36.4 °C (97.5 °F)-36.8 °C (98.3 °F)] 36.8 °C (98.3 " °F)  Pulse:  [58-88] 67  Resp:  [11-30] 19  BP: (101-138)/(59-93) 123/70  SpO2:  [90 %-99 %] 94 %    Physical Exam  Constitutional:       General: He is not in acute distress.     Appearance: He is well-developed. He is not diaphoretic.   HENT:      Head: Normocephalic and atraumatic.   Eyes:      General: Scleral icterus present.      Conjunctiva/sclera: Conjunctivae normal.   Neck:      Vascular: No JVD.   Cardiovascular:      Rate and Rhythm: Normal rate.      Heart sounds: No murmur heard.    No gallop.   Pulmonary:      Effort: Pulmonary effort is normal. No respiratory distress.      Breath sounds: No stridor. No wheezing or rales.   Abdominal:      Palpations: Abdomen is soft.      Tenderness: There is no abdominal tenderness. There is no guarding or rebound.   Musculoskeletal:         General: No tenderness.      Right lower leg: No edema.      Left lower leg: No edema.   Skin:     General: Skin is warm and dry.      Coloration: Skin is jaundiced.      Findings: No rash.   Neurological:      General: No focal deficit present.      Mental Status: He is alert and oriented to person, place, and time.   Psychiatric:         Mood and Affect: Mood normal.         Behavior: Behavior normal.         Thought Content: Thought content normal.       Fluids  Date 02/24/23 0700 - 02/25/23 0659   Shift 4151-2206 9184-6385 4649-2863 24 Hour Total   INTAKE   I.V. 200.3   200.3   IV Piggyback 48.4   48.4   Shift Total 248.7   248.7   OUTPUT   Shift Total       Weight (kg) 76.8 76.8 76.8 76.8       Laboratory  Recent Labs     02/22/23  1740 02/23/23  0040 02/23/23  0643 02/23/23  1350 02/23/23  2158 02/24/23  0430   WBC 10.1 13.0* 15.4*  --   --   --    RBC 2.79* 2.88* 2.89*  --   --   --    HEMOGLOBIN 8.5* 8.6* 8.7* 9.2* 10.1* 9.1*   HEMATOCRIT 24.2* 25.1* 24.9*  --   --   --    MCV 86.7 87.2 86.2  --   --   --    MCH 30.5 29.9 30.1  --   --   --    MCHC 35.1 34.3 34.9  --   --   --    RDW 48.4 48.3 48.3  --   --   --     PLATELETCT 99* 110* 107*  --   --   --    MPV 10.8 10.7 10.8  --   --   --      Recent Labs     02/22/23  0609 02/23/23  0643 02/23/23  2158 02/24/23  0430   SODIUM 138 140  --  140   POTASSIUM 4.2 3.4* 3.5* 3.3*   CHLORIDE 109 111  --  111   CO2 18* 19*  --  21   GLUCOSE 153* 99  --  82   BUN 50* 42*  --  31*   CREATININE 0.77 0.70  --  0.67   CALCIUM 8.1* 7.6*  --  7.7*     Recent Labs     02/21/23  2139   APTT 26.3   INR 1.55*                 Imaging  CT-DRAIN-CHOLECTYSTOSTOMY   Final Result      1. CT guided cholecystostomy with placement of an 8-[place] locking loop catheter.      2. Before catheter removal, a cholecystogram with cholangiogram would be recommended to assure patency of the common duct. Likewise, a tracto-gram may also be helpful to make sure that the catheter tract has matured so that there is not intraperitoneal    bile leakage after catheter removal.      CT-ABDOMEN-PELVIS WITH   Final Result   Addendum (preliminary) 1 of 1   Findings were discussed with Dr. Stern on 2/22/2023 6:28 AM.      Final      1.  Distended gallbladder with air in the gallbladder wall and pericholecystic fluid/fat stranding, highly suggestive of acute emphysematous cholecystitis.   2.  Pneumobilia, moderate biliary dilatation and CBD wall enhancement. A component of cholangitis is likely.   3.  A 3.2 cm pancreatic mass obstructing the CBD and pancreatic ducts and resulting in pancreatic atrophy.   4.  The mass abuts and narrows the portal vein. It also abuts the SMV and hepatic artery.   5.  Multiple hypoattenuation hepatic lesions. Some of them likely represents pancreatic cancer metastases, but some may represent small abscesses. They measure up to 1.4 cm. Consider evaluation with contrast-enhanced abdominal MRI.   6.  Metallic stent, located in the lumen of the sigmoid colon.   7.  Punctate nonobstructing left nephrolithiasis.   8.  Small volume ascites.      Attempts to contact patient's provider regarding  potentially critical findings were initiated on 2/22/2023 0558 AM.      CT-HEAD W/O   Final Result      1. No CT evidence of acute infarct, hemorrhage or mass.   2. Mild global parenchymal atrophy. Chronic small vessel ischemic changes.      DX-CHEST-PORTABLE (1 VIEW)   Final Result      Mild bibasilar atelectasis.          Assessment/Plan  * Septic shock (HCC)- (present on admission)  Assessment & Plan  Combined septic/hemorragic shock    Pancreatic cancer (HCC)- (present on admission)  Assessment & Plan  Being treated at [facility] by Dr Villegas  Spoke with her 2/24 and updated her.  She agrees with current management  Pt has scheduled follow up in early March    Hemorrhagic shock (HCC)  Assessment & Plan  Now s/p resuscitation and has resolved  Cont to monitor    Emphysematous cholecystitis  Assessment & Plan  Now s/p cholecystostomy tube 2/23  Cultures showing enterobacter cloacae from blood: follow to final ID and sensitivities  Currently on Pip/Tazo    Hypokalemia- (present on admission)  Assessment & Plan  Replace IV/PO and follow    Goals of care, counseling/discussion- (present on admission)  Assessment & Plan  DW pt in person and his wife by phone  He had been going to comfort care as they both thought he was unlikely to survive however today he is feeling much better.  They now wish to try to get him through his acute illness until he can see Dr Villegas ([facility]) in March to consider continuing chemo.  Based on discussion pt will remain DNR/I however cont to treat medical issues agressively     Elevated alkaline phosphatase level- (present on admission)  Assessment & Plan  Likely malignant etiology  Cholecystostomy tube in place    Transaminitis- (present on admission)  Assessment & Plan  Improved now s/p cholecystostomy tube placement    Acute respiratory failure with hypoxia (HCC)- (present on admission)  Assessment & Plan  O2 requriements coming down as his sepsis and encephalopathy improve  Cont O2/RT  protocols  Mobilize  Watch volume status    Acute encephalopathy- (present on admission)  Assessment & Plan  Acute metabolic/infectious  Markedly improved today  Cont to address infectious and metabolic issues  Minimize sedating medications  Mobilize  Windows open    Acute upper GI bleeding- (present on admission)  Assessment & Plan  Clinically appears to have stopped bleeding  Cont IV PPI  Cont serial H&H  DW GI: pt is very high risk for intervention.  They feel medical management is the best approach at this time      Acute blood loss anemia- (present on admission)  Assessment & Plan  Clinically looks like an UGIB  Clinically appears to have stopped bleeding  Cont IV PPI  Cont serial H&H  DW GI: pt is very high risk for intervention.  They feel medical management is the best approach at this time

## 2023-02-24 NOTE — THERAPY
"Occupational Therapy   Initial Evaluation     Patient Name: Carl Finney  Age:  72 y.o., Sex:  male  Medical Record #: [id number]  Today's Date: 2/24/2023    Precautions: Fall Risk  Comments: Jhonatan drain to R abdomen    Assessment    Patient is 72 y.o. male with h/o HTN, HLD, pancreatic cancer with recent change to chemo who presented with AMS, jaundice. Pt dx with cholecystitis, GI bleed. Pt had jhonatan tube placed on 2/23. Seen now for OT eval. See grid below for details. Pt able to mobilize to/from sink x 2 using FWW. Educated on use of shower chair and hand-held nozzle for fall reduction and energy conservation. Pt is limited by generalized weakness, balance impairment. Pt is below baseline and will benefit from acute OT. Based on prognosis pt is most appropriate to DC home with spousal assist and home health if eligible. Will follow while in-house.      Plan    Occupational Therapy Initial Treatment Plan   Treatment Interventions: Self Care / Activities of Daily Living, Therapeutic Exercises, Neuro Re-Education / Balance, Therapeutic Activity  Treatment Frequency: 3 Times per Week  Duration: Until Therapy Goals Met    DC Equipment Recommendations: Tub / Shower Seat, Hand Held Shower  Discharge Recommendations: Recommend home health for continued occupational therapy services     Subjective    \"I'm probably as weak as I've been since this started.\"        02/24/23 1224   Prior Living Situation   Housing / Facility 1 Story House   Steps Into Home 0   Steps In Home 1  (down to living room)   Rail None   Bathroom Set up Walk In Shower;Bathtub / Shower Combination   Equipment Owned None   Lives with - Patient's Self Care Capacity Spouse   Comments Pt lives with spouse who can assist as needed   Prior Level of ADL Function   Comments Pt was independent with BADL PTA   Prior Level of IADL Function   Laundry Independent   Finances Independent   Home Management Independent   Shopping Independent   Prior Level Of " Mobility Independent Without Device in Community;Independent Without Device in Home   Driving / Transportation Driving Independent   Occupation (Pre-Hospital Vocational) Retired Due To Age   Cognition    Cognition / Consciousness WDL   Level of Consciousness Alert   Comments Pleasant & cooperative; good insight into cancer dx   Active ROM Upper Body   Active ROM Upper Body  WDL   Dominant Hand Right   Strength Upper Body   Upper Body Strength  WDL   Sensation Upper Body   Comments 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, but much improved currently   Coordination Upper Body   Coordination WDL   Balance Assessment   Sitting Balance (Static) Fair +   Sitting Balance (Dynamic) Fair   Standing Balance (Static) Fair -   Standing Balance (Dynamic) Fair -   Weight Shift Sitting Fair   Weight Shift Standing Fair   Comments FWW for standing   Bed Mobility    Supine to Sit   (up to chair pre)   Sit to Supine   (up to chair post)   Scooting Supervised  (seated)   ADL Assessment   Grooming Contact Guard Assist;Standing  (oral care at sink)   Lower Body Dressing Standby Assist  (doff/don B socks only)   Toileting   (NT; declined need)   Functional Mobility   Sit to Stand Contact Guard Assist   Bed, Chair, Wheelchair Transfer Contact Guard Assist   Transfer Method Stand Step  (FWW)   Mobility STS, short gait and transfers in room   Short Term Goals   Short Term Goal # 1 Pt will complete ADL transfers with supv   Short Term Goal # 2 Pt will complete FB dressing with supv   Short Term Goal # 3 Pt will complete standing grooming with supv

## 2023-02-24 NOTE — PROGRESS NOTES
Surgical Progress Note    Author: Yfn Schwarz M.D. Date & Time created: 2023   6:16 PM     Interval Events:  Seen and examined earlier in day.  Has had time to think about cholecystostomy tube and would like to proceed with procedure.  Feeling better today.  Hgb has remained stable    Review of Systems   Constitutional:  Negative for chills, fever, malaise/fatigue and weight loss.   HENT:  Negative for congestion, ear discharge, ear pain, hearing loss and nosebleeds.    Eyes:  Negative for blurred vision, double vision, photophobia, pain and discharge.   Respiratory:  Negative for cough, hemoptysis and sputum production.    Cardiovascular:  Negative for chest pain, palpitations, orthopnea and claudication.   Gastrointestinal:  Negative for abdominal pain, constipation, diarrhea, heartburn, nausea and vomiting.   Genitourinary:  Negative for dysuria, frequency, hematuria and urgency.   Musculoskeletal:  Negative for back pain, joint pain, myalgias and neck pain.   Skin:  Negative for itching and rash.   Neurological:  Negative for dizziness, tingling, tremors, sensory change, speech change, focal weakness and headaches.   Endo/Heme/Allergies:  Negative for environmental allergies. Does not bruise/bleed easily.   Psychiatric/Behavioral:  Negative for depression, hallucinations, substance abuse and suicidal ideas. The patient is not nervous/anxious.    Hemodynamics:  Temp (24hrs), Av.8 °C (98.3 °F), Min:36.6 °C (97.9 °F), Max:37.1 °C (98.8 °F)  Temperature: 36.7 °C (98 °F)  Pulse  Av.4  Min: 56  Max: 152   Blood Pressure : 130/66     Respiratory:    Respiration: 16, Pulse Oximetry: 94 %        RUL Breath Sounds: Clear, RML Breath Sounds: Clear;Diminished, RLL Breath Sounds: Diminished, RUEL Breath Sounds: Clear, LLL Breath Sounds: Clear;Diminished  Neuro:  GCS       Fluids:    Intake/Output Summary (Last 24 hours) at 2023 1816  Last data filed at 2023 1800  Gross per 24 hour   Intake  3201.21 ml   Output 1340 ml   Net 1861.21 ml     Weight: 64.4 kg (141 lb 15.6 oz)  Current Diet Order   Procedures    Diet NPO Restrict to: Strict     Physical Exam  Constitutional:       Appearance: Normal appearance.   HENT:      Head: Normocephalic.   Cardiovascular:      Rate and Rhythm: Normal rate and regular rhythm.   Pulmonary:      Effort: Pulmonary effort is normal. No respiratory distress.   Abdominal:      Palpations: Abdomen is soft.      Tenderness: There is no abdominal tenderness.   Neurological:      Mental Status: He is alert.     Labs:  Recent Results (from the past 24 hour(s))   CBC WITH DIFFERENTIAL    Collection Time: 02/23/23 12:40 AM   Result Value Ref Range    WBC 13.0 (H) 4.8 - 10.8 K/uL    RBC 2.88 (L) 4.70 - 6.10 M/uL    Hemoglobin 8.6 (L) 14.0 - 18.0 g/dL    Hematocrit 25.1 (L) 42.0 - 52.0 %    MCV 87.2 81.4 - 97.8 fL    MCH 29.9 27.0 - 33.0 pg    MCHC 34.3 33.7 - 35.3 g/dL    RDW 48.3 35.9 - 50.0 fL    Platelet Count 110 (L) 164 - 446 K/uL    MPV 10.7 9.0 - 12.9 fL    Neutrophils-Polys 88.80 (H) 44.00 - 72.00 %    Lymphocytes 6.60 (L) 22.00 - 41.00 %    Monocytes 3.60 0.00 - 13.40 %    Eosinophils 0.00 0.00 - 6.90 %    Basophils 0.10 0.00 - 1.80 %    Immature Granulocytes 0.90 0.00 - 0.90 %    Nucleated RBC 0.60 /100 WBC    Neutrophils (Absolute) 11.51 (H) 1.82 - 7.42 K/uL    Lymphs (Absolute) 0.85 (L) 1.00 - 4.80 K/uL    Monos (Absolute) 0.47 0.00 - 0.85 K/uL    Eos (Absolute) 0.00 0.00 - 0.51 K/uL    Baso (Absolute) 0.01 0.00 - 0.12 K/uL    Immature Granulocytes (abs) 0.12 (H) 0.00 - 0.11 K/uL    NRBC (Absolute) 0.08 K/uL   Comp Metabolic Panel (CMP)    Collection Time: 02/23/23  6:43 AM   Result Value Ref Range    Sodium 140 135 - 145 mmol/L    Potassium 3.4 (L) 3.6 - 5.5 mmol/L    Chloride 111 96 - 112 mmol/L    Co2 19 (L) 20 - 33 mmol/L    Anion Gap 10.0 7.0 - 16.0    Glucose 99 65 - 99 mg/dL    Bun 42 (H) 8 - 22 mg/dL    Creatinine 0.70 0.50 - 1.40 mg/dL    Calcium 7.6 (L) 8.5 -  10.5 mg/dL    AST(SGOT) 28 12 - 45 U/L    ALT(SGPT) 35 2 - 50 U/L    Alkaline Phosphatase 410 (H) 30 - 99 U/L    Total Bilirubin 2.6 (H) 0.1 - 1.5 mg/dL    Albumin 1.9 (L) 3.2 - 4.9 g/dL    Total Protein 4.1 (L) 6.0 - 8.2 g/dL    Globulin 2.2 1.9 - 3.5 g/dL    A-G Ratio 0.9 g/dL   MAGNESIUM    Collection Time: 02/23/23  6:43 AM   Result Value Ref Range    Magnesium 2.1 1.5 - 2.5 mg/dL   PHOSPHORUS    Collection Time: 02/23/23  6:43 AM   Result Value Ref Range    Phosphorus 2.1 (L) 2.5 - 4.5 mg/dL   CBC WITH DIFFERENTIAL    Collection Time: 02/23/23  6:43 AM   Result Value Ref Range    WBC 15.4 (H) 4.8 - 10.8 K/uL    RBC 2.89 (L) 4.70 - 6.10 M/uL    Hemoglobin 8.7 (L) 14.0 - 18.0 g/dL    Hematocrit 24.9 (L) 42.0 - 52.0 %    MCV 86.2 81.4 - 97.8 fL    MCH 30.1 27.0 - 33.0 pg    MCHC 34.9 33.7 - 35.3 g/dL    RDW 48.3 35.9 - 50.0 fL    Platelet Count 107 (L) 164 - 446 K/uL    MPV 10.8 9.0 - 12.9 fL    Neutrophils-Polys 89.50 (H) 44.00 - 72.00 %    Lymphocytes 5.20 (L) 22.00 - 41.00 %    Monocytes 4.10 0.00 - 13.40 %    Eosinophils 0.00 0.00 - 6.90 %    Basophils 0.10 0.00 - 1.80 %    Immature Granulocytes 1.10 (H) 0.00 - 0.90 %    Nucleated RBC 0.80 /100 WBC    Neutrophils (Absolute) 13.81 (H) 1.82 - 7.42 K/uL    Lymphs (Absolute) 0.80 (L) 1.00 - 4.80 K/uL    Monos (Absolute) 0.64 0.00 - 0.85 K/uL    Eos (Absolute) 0.00 0.00 - 0.51 K/uL    Baso (Absolute) 0.02 0.00 - 0.12 K/uL    Immature Granulocytes (abs) 0.17 (H) 0.00 - 0.11 K/uL    NRBC (Absolute) 0.13 K/uL   CORRECTED CALCIUM    Collection Time: 02/23/23  6:43 AM   Result Value Ref Range    Correct Calcium 9.3 8.5 - 10.5 mg/dL   ESTIMATED GFR    Collection Time: 02/23/23  6:43 AM   Result Value Ref Range    GFR (CKD-EPI) 98 >60 mL/min/1.73 m 2   CULTURE WOUND W/ GRAM STAIN    Collection Time: 02/23/23 12:20 PM    Specimen: Wound   Result Value Ref Range    Significant Indicator NEG     Source WND     Site Gallbladder Drainage     Culture Result -     Gram Stain  Result Many WBCs.  Many Gram positive cocci.      GRAM STAIN    Collection Time: 02/23/23 12:20 PM    Specimen: Wound   Result Value Ref Range    Significant Indicator .     Source WND     Site Gallbladder Drainage     Gram Stain Result Many WBCs.  Many Gram positive cocci.      HGB (Hemoglobin) for 48 hours    Collection Time: 02/23/23  1:50 PM   Result Value Ref Range    Hemoglobin 9.2 (L) 14.0 - 18.0 g/dL     Medical Decision Making, by Problem:  Active Hospital Problems    Diagnosis     Acute blood loss anemia [D62]      Priority: High    Acute upper GI bleeding [K92.2]      Priority: High    Acute encephalopathy [G93.40]      Priority: High    Acute respiratory failure with hypoxia (HCC) [J96.01]      Priority: High    Transaminitis [R74.01]      Priority: High    Elevated alkaline phosphatase level [R74.8]      Priority: High    Pancreatic cancer metastasized to liver (HCC) [C25.9, C78.7]      Priority: High    Goals of care, counseling/discussion [Z71.89]      Priority: High    Hypokalemia [E87.6]      Priority: High    Emphysematous cholecystitis [K81.0]     Hemorrhagic shock (HCC) [R57.8]     Septic shock (HCC) [A41.9, R65.21]      Plan:   Patient is a as well as 72-year-old male with metastatic pancreas cancer who presented with acute blood loss anemia secondary to GI bleed as well as CT findings consistent with cholecystitis and 1 out of 2 blood cultures positive for gram-negative rods.  He has stabilized with resuscitation with blood and fluid and his vital signs are stable on antibiotics currently.  He has stability in his hemoglobin at this time.  On exam he is nontender does not endorse any ongoing right upper quadrant pain.    His hgb is table, WBC up from yesterday.    Recommendation:  -CT guided cholecystostomy tube, patient not a great surgical candidate  -IV abx  -NPO for tube placement  -Trend hgb        Quality Measures:  Quality-Core Measures    Discussed patient condition with Hospitalist and  Patient

## 2023-02-24 NOTE — ASSESSMENT & PLAN NOTE
Combined septic/hemorragic shock - now resolved    ID following  E. Cloaca bacteremia and gallbladder infection, E. Faecalis and S. angionosus gallbladder infection  Continue IV zosyn  Dr. Schwarz was updated, Surgery who plans cholecystectomy outpatient and in a month or so  IR to upsize and replace drain  Continue supportive care

## 2023-02-24 NOTE — ASSESSMENT & PLAN NOTE
DW pt in person and his wife by phone  He had been going to comfort care as they both thought he was unlikely to survive however today he is feeling much better.  They now wish to try to get him through his acute illness until he can see Dr Villegas ([facility]) in March to consider continuing chemo.  Based on discussion pt will remain DNR/I however cont to treat medical issues agressively

## 2023-02-24 NOTE — DISCHARGE PLANNING
Care Transition Team Discharge Planning                   Discharge Plan:  Mr. Finney would like to have a lateral transfer back to [place] where he is receiving care for a terminal illness ([place])  It is possible that he will have an EDG prior to being txf/discharged, will downgrade to floor

## 2023-02-24 NOTE — ASSESSMENT & PLAN NOTE
Being treated at [facility] by Dr Villegas  Spoke with her 2/24 and updated her.  She agrees with current management  Pt has scheduled follow up in early March

## 2023-02-25 LAB
ALBUMIN SERPL BCP-MCNC: 2.1 G/DL (ref 3.2–4.9)
ALBUMIN/GLOB SERPL: 0.8 G/DL
ALP SERPL-CCNC: 333 U/L (ref 30–99)
ALT SERPL-CCNC: 22 U/L (ref 2–50)
ANION GAP SERPL CALC-SCNC: 10 MMOL/L (ref 7–16)
AST SERPL-CCNC: 15 U/L (ref 12–45)
BACTERIA BLD CULT: ABNORMAL
BACTERIA BLD CULT: ABNORMAL
BILIRUB SERPL-MCNC: 2.3 MG/DL (ref 0.1–1.5)
BUN SERPL-MCNC: 23 MG/DL (ref 8–22)
CALCIUM ALBUM COR SERPL-MCNC: 9.3 MG/DL (ref 8.5–10.5)
CALCIUM SERPL-MCNC: 7.8 MG/DL (ref 8.5–10.5)
CHLORIDE SERPL-SCNC: 108 MMOL/L (ref 96–112)
CO2 SERPL-SCNC: 19 MMOL/L (ref 20–33)
CREAT SERPL-MCNC: 0.72 MG/DL (ref 0.5–1.4)
ERYTHROCYTE [DISTWIDTH] IN BLOOD BY AUTOMATED COUNT: 50.8 FL (ref 35.9–50)
ERYTHROCYTE [DISTWIDTH] IN BLOOD BY AUTOMATED COUNT: 51.9 FL (ref 35.9–50)
ERYTHROCYTE [DISTWIDTH] IN BLOOD BY AUTOMATED COUNT: 53 FL (ref 35.9–50)
GFR SERPLBLD CREATININE-BSD FMLA CKD-EPI: 97 ML/MIN/1.73 M 2
GLOBULIN SER CALC-MCNC: 2.5 G/DL (ref 1.9–3.5)
GLUCOSE SERPL-MCNC: 99 MG/DL (ref 65–99)
HCT VFR BLD AUTO: 24.9 % (ref 42–52)
HCT VFR BLD AUTO: 26.3 % (ref 42–52)
HCT VFR BLD AUTO: 27.6 % (ref 42–52)
HGB BLD-MCNC: 8.5 G/DL (ref 14–18)
HGB BLD-MCNC: 8.9 G/DL (ref 14–18)
HGB BLD-MCNC: 9.3 G/DL (ref 14–18)
MAGNESIUM SERPL-MCNC: 2 MG/DL (ref 1.5–2.5)
MCH RBC QN AUTO: 30.2 PG (ref 27–33)
MCH RBC QN AUTO: 30.3 PG (ref 27–33)
MCH RBC QN AUTO: 30.6 PG (ref 27–33)
MCHC RBC AUTO-ENTMCNC: 33.7 G/DL (ref 33.7–35.3)
MCHC RBC AUTO-ENTMCNC: 33.8 G/DL (ref 33.7–35.3)
MCHC RBC AUTO-ENTMCNC: 34.1 G/DL (ref 33.7–35.3)
MCV RBC AUTO: 88.6 FL (ref 81.4–97.8)
MCV RBC AUTO: 89.9 FL (ref 81.4–97.8)
MCV RBC AUTO: 90.4 FL (ref 81.4–97.8)
PHOSPHATE SERPL-MCNC: 2.2 MG/DL (ref 2.5–4.5)
PLATELET # BLD AUTO: 151 K/UL (ref 164–446)
PLATELET # BLD AUTO: 156 K/UL (ref 164–446)
PLATELET # BLD AUTO: 169 K/UL (ref 164–446)
PMV BLD AUTO: 10.2 FL (ref 9–12.9)
PMV BLD AUTO: 10.4 FL (ref 9–12.9)
PMV BLD AUTO: 10.6 FL (ref 9–12.9)
POTASSIUM SERPL-SCNC: 3.8 MMOL/L (ref 3.6–5.5)
PROT SERPL-MCNC: 4.6 G/DL (ref 6–8.2)
RBC # BLD AUTO: 2.81 M/UL (ref 4.7–6.1)
RBC # BLD AUTO: 2.91 M/UL (ref 4.7–6.1)
RBC # BLD AUTO: 3.07 M/UL (ref 4.7–6.1)
SIGNIFICANT IND 70042: ABNORMAL
SITE SITE: ABNORMAL
SODIUM SERPL-SCNC: 137 MMOL/L (ref 135–145)
SOURCE SOURCE: ABNORMAL
WBC # BLD AUTO: 6.8 K/UL (ref 4.8–10.8)
WBC # BLD AUTO: 7.2 K/UL (ref 4.8–10.8)
WBC # BLD AUTO: 8.8 K/UL (ref 4.8–10.8)

## 2023-02-25 PROCEDURE — 306580 SET INFUSION,POWERLOC 20G X.75: Performed by: INTERNAL MEDICINE

## 2023-02-25 PROCEDURE — 83735 ASSAY OF MAGNESIUM: CPT

## 2023-02-25 PROCEDURE — 770001 HCHG ROOM/CARE - MED/SURG/GYN PRIV*

## 2023-02-25 PROCEDURE — 700111 HCHG RX REV CODE 636 W/ 250 OVERRIDE (IP): Performed by: INTERNAL MEDICINE

## 2023-02-25 PROCEDURE — 99497 ADVNCD CARE PLAN 30 MIN: CPT | Performed by: INTERNAL MEDICINE

## 2023-02-25 PROCEDURE — 306578 KIT,PORT ACCESS 20G X 1.5INCH: Performed by: HOSPITALIST

## 2023-02-25 PROCEDURE — 700105 HCHG RX REV CODE 258: Performed by: INTERNAL MEDICINE

## 2023-02-25 PROCEDURE — 80053 COMPREHEN METABOLIC PANEL: CPT

## 2023-02-25 PROCEDURE — 36415 COLL VENOUS BLD VENIPUNCTURE: CPT

## 2023-02-25 PROCEDURE — 99233 SBSQ HOSP IP/OBS HIGH 50: CPT | Performed by: INTERNAL MEDICINE

## 2023-02-25 PROCEDURE — C9113 INJ PANTOPRAZOLE SODIUM, VIA: HCPCS | Performed by: INTERNAL MEDICINE

## 2023-02-25 PROCEDURE — 85027 COMPLETE CBC AUTOMATED: CPT | Mod: 91

## 2023-02-25 PROCEDURE — 84100 ASSAY OF PHOSPHORUS: CPT

## 2023-02-25 RX ADMIN — PANTOPRAZOLE SODIUM 40 MG: 40 INJECTION, POWDER, FOR SOLUTION INTRAVENOUS at 17:39

## 2023-02-25 RX ADMIN — PIPERACILLIN AND TAZOBACTAM 3.38 G: 3; .375 INJECTION, POWDER, LYOPHILIZED, FOR SOLUTION INTRAVENOUS; PARENTERAL at 00:59

## 2023-02-25 RX ADMIN — PIPERACILLIN AND TAZOBACTAM 3.38 G: 3; .375 INJECTION, POWDER, LYOPHILIZED, FOR SOLUTION INTRAVENOUS; PARENTERAL at 16:14

## 2023-02-25 RX ADMIN — PANTOPRAZOLE SODIUM 40 MG: 40 INJECTION, POWDER, FOR SOLUTION INTRAVENOUS at 06:02

## 2023-02-25 RX ADMIN — PIPERACILLIN AND TAZOBACTAM 3.38 G: 3; .375 INJECTION, POWDER, LYOPHILIZED, FOR SOLUTION INTRAVENOUS; PARENTERAL at 09:58

## 2023-02-25 ASSESSMENT — ENCOUNTER SYMPTOMS
PALPITATIONS: 0
FALLS: 0
FEVER: 0
DIZZINESS: 0
LOSS OF CONSCIOUSNESS: 0
HEMOPTYSIS: 0
VOMITING: 0
EYE PAIN: 0
HEADACHES: 0
COUGH: 1
DIARRHEA: 0
INSOMNIA: 0
WEAKNESS: 0
TREMORS: 0
NERVOUS/ANXIOUS: 0
CHILLS: 0
SHORTNESS OF BREATH: 0
SEIZURES: 0
NAUSEA: 0
ABDOMINAL PAIN: 1
EYE REDNESS: 0
FOCAL WEAKNESS: 0
CONSTIPATION: 0
MYALGIAS: 0
WHEEZING: 0
BLOOD IN STOOL: 0

## 2023-02-25 ASSESSMENT — COGNITIVE AND FUNCTIONAL STATUS - GENERAL
WALKING IN HOSPITAL ROOM: A LITTLE
DAILY ACTIVITIY SCORE: 20
STANDING UP FROM CHAIR USING ARMS: A LITTLE
MOBILITY SCORE: 20
HELP NEEDED FOR BATHING: A LITTLE
DRESSING REGULAR LOWER BODY CLOTHING: A LITTLE
SUGGESTED CMS G CODE MODIFIER DAILY ACTIVITY: CJ
TOILETING: A LITTLE
SUGGESTED CMS G CODE MODIFIER MOBILITY: CJ
MOVING TO AND FROM BED TO CHAIR: A LITTLE
MOVING FROM LYING ON BACK TO SITTING ON SIDE OF FLAT BED: A LITTLE
DRESSING REGULAR UPPER BODY CLOTHING: A LITTLE

## 2023-02-25 ASSESSMENT — FIBROSIS 4 INDEX: FIB4 SCORE: 1.5

## 2023-02-25 NOTE — PROGRESS NOTES
"Hospital Medicine Daily Progress Note    Date of Service  2/25/2023    Chief Complaint  Carl Finney is a 72 y.o. male admitted 2/21/2023 with [facility]    Hospital Course  No notes on file  72 y.o. male who presented 2/21/2023 with previous medical history that includes hypertension, dyslipidemia, prostate cancer s/p prostatectomy, gout, recently diagnosed pancreatic cancer with mets to the liver in November of last year, s/p ERCP and biliary stent placement.  He was started on chemotherapy with gemcitabine and Abraxane by Dr. Villegas at [facility].  Patient presented for evaluation on February 21 with complaint of abdominal pain and nausea and vomiting over the previous 48 hours.  His emesis had been bloody.  In the ED was hypotensive and tachycardic with signs of hypoperfusion and diagnosed with shock.  His hemoglobin came back at 3.4.  Patient was transfused in the ED and admitted to the ICU with diagnosis of mixed shock from sepsis and GI bleed.  General surgery Dr. Oliver and gastroenterology Dr. Mike were consulted.     In the ICU CT was done which demonstrated evidence of cholecystitis.  He was started empirically on meropenem, and IR placed a cholecystostomy tube on February 23rd.  Blood cultures done on February 21 came back 1 of 2 positive for Enterobacter cloacae.  Subsequent cultures from blood and from the cholecystostomy tube and blood have been negative.     Patient's CODE STATUS is DNR/I     DW Dr Villegas [phone]: pt has follow up appointment with her in March.  She would like to see him in person before deciding on continued chemo\"    Dr. Herron. 2/24/2023    Interval Problem Update  2/25. I spoke with him. Palliative was also there briefly. Reviewed chart. He is doing well, baseline mentation. He has  ah history of metastatic pancreatic cancer and is planned for chemo trial once his infection is cleared. Currently he is on antibiotics for E. Cloacae bacteremia and S., aginosus, E. Cloacae and " E. Faecalis. Only sensitivitis for E> cloaca have been derived, rest are pending. I spoke with him regarding goals of care, he would like to treat his infection and see if he is a candidate for the trials. If not, then he is pursuing hospice nd he would like to be in [place] for that. Palliative ware.    I have discussed this patient's plan of care and discharge plan at IDT rounds today with Case Management, Nursing, Nursing leadership, and other members of the IDT team.    Consultants/Specialty  Intensivist admitted  Hospitalist  Surgery  GI    Code Status  DNAR/DNI    Disposition  Patient is not medically cleared for discharge.   Anticipate discharge to  D .  I have placed the appropriate orders for post-discharge needs.    Review of Systems  Review of Systems   Constitutional:  Positive for malaise/fatigue. Negative for chills and fever.   HENT:  Negative for congestion, hearing loss and nosebleeds.    Eyes:  Negative for pain and redness.   Respiratory:  Positive for cough. Negative for hemoptysis, shortness of breath and wheezing.    Cardiovascular:  Negative for chest pain and palpitations.   Gastrointestinal:  Positive for abdominal pain. Negative for blood in stool, constipation, diarrhea, nausea and vomiting.   Genitourinary:  Negative for dysuria, frequency and hematuria.   Musculoskeletal:  Negative for falls, joint pain and myalgias.   Skin:  Negative for rash.   Neurological:  Negative for dizziness, tremors, focal weakness, seizures, loss of consciousness, weakness and headaches.   Psychiatric/Behavioral:  The patient is not nervous/anxious and does not have insomnia.    All other systems reviewed and are negative.     Physical Exam  Temp:  [36.4 °C (97.6 °F)-36.8 °C (98.3 °F)] 36.6 °C (97.8 °F)  Pulse:  [66-80] 72  Resp:  [16-18] 18  BP: (102-115)/(57-67) 115/64  SpO2:  [92 %-96 %] 92 %    Physical Exam  Vitals and nursing note reviewed.   Constitutional:       Comments: Thin   HENT:      Head:  Normocephalic and atraumatic.      Right Ear: External ear normal.      Left Ear: External ear normal.      Nose: Nose normal.      Mouth/Throat:      Mouth: Mucous membranes are moist.   Eyes:      General: Scleral icterus present.      Conjunctiva/sclera: Conjunctivae normal.   Cardiovascular:      Rate and Rhythm: Normal rate and regular rhythm.      Heart sounds: No murmur heard.    No friction rub. No gallop.   Pulmonary:      Effort: Pulmonary effort is normal.      Breath sounds: Normal breath sounds.   Abdominal:      General: Abdomen is flat. Bowel sounds are normal. There is no distension.      Palpations: Abdomen is soft.      Tenderness: There is no abdominal tenderness. There is no guarding.   Musculoskeletal:         General: Normal range of motion.      Cervical back: Normal range of motion and neck supple.   Skin:     General: Skin is warm.      Coloration: Skin is jaundiced.   Neurological:      Mental Status: He is alert and oriented to person, place, and time. Mental status is at baseline.      Motor: Weakness present.   Psychiatric:         Mood and Affect: Mood normal.         Behavior: Behavior normal.         Thought Content: Thought content normal.         Judgment: Judgment normal.       Fluids    Intake/Output Summary (Last 24 hours) at 2/25/2023 1428  Last data filed at 2/25/2023 0421  Gross per 24 hour   Intake 600 ml   Output 675 ml   Net -75 ml       Laboratory  Recent Labs     02/23/23  0040 02/23/23  0643 02/23/23  1350 02/24/23  0430 02/24/23  1633 02/25/23  0615   WBC 13.0* 15.4*  --   --   --  8.8   RBC 2.88* 2.89*  --   --   --  3.07*   HEMOGLOBIN 8.6* 8.7*   < > 9.1* 9.6* 9.3*   HEMATOCRIT 25.1* 24.9*  --   --   --  27.6*   MCV 87.2 86.2  --   --   --  89.9   MCH 29.9 30.1  --   --   --  30.3   MCHC 34.3 34.9  --   --   --  33.7   RDW 48.3 48.3  --   --   --  51.9*   PLATELETCT 110* 107*  --   --   --  151*   MPV 10.7 10.8  --   --   --  10.6    < > = values in this interval not  displayed.     Recent Labs     02/23/23  0643 02/23/23  2158 02/24/23  0430 02/25/23  0615   SODIUM 140  --  140 137   POTASSIUM 3.4* 3.5* 3.3* 3.8   CHLORIDE 111  --  111 108   CO2 19*  --  21 19*   GLUCOSE 99  --  82 99   BUN 42*  --  31* 23*   CREATININE 0.70  --  0.67 0.72   CALCIUM 7.6*  --  7.7* 7.8*                   Imaging  CT-DRAIN-CHOLECTYSTOSTOMY   Final Result      1. CT guided cholecystostomy with placement of an 8-Estonian locking loop catheter.      2. Before catheter removal, a cholecystogram with cholangiogram would be recommended to assure patency of the common duct. Likewise, a tracto-gram may also be helpful to make sure that the catheter tract has matured so that there is not intraperitoneal    bile leakage after catheter removal.      CT-ABDOMEN-PELVIS WITH   Final Result   Addendum (preliminary) 1 of 1   Findings were discussed with Dr. Stern on 2/22/2023 6:28 AM.      Final      1.  Distended gallbladder with air in the gallbladder wall and pericholecystic fluid/fat stranding, highly suggestive of acute emphysematous cholecystitis.   2.  Pneumobilia, moderate biliary dilatation and CBD wall enhancement. A component of cholangitis is likely.   3.  A 3.2 cm pancreatic mass obstructing the CBD and pancreatic ducts and resulting in pancreatic atrophy.   4.  The mass abuts and narrows the portal vein. It also abuts the SMV and hepatic artery.   5.  Multiple hypoattenuation hepatic lesions. Some of them likely represents pancreatic cancer metastases, but some may represent small abscesses. They measure up to 1.4 cm. Consider evaluation with contrast-enhanced abdominal MRI.   6.  Metallic stent, located in the lumen of the sigmoid colon.   7.  Punctate nonobstructing left nephrolithiasis.   8.  Small volume ascites.      Attempts to contact patient's provider regarding potentially critical findings were initiated on 2/22/2023 0558 AM.      CT-HEAD W/O   Final Result      1. No CT evidence of acute  "infarct, hemorrhage or mass.   2. Mild global parenchymal atrophy. Chronic small vessel ischemic changes.      DX-CHEST-PORTABLE (1 VIEW)   Final Result      Mild bibasilar atelectasis.           Assessment/Plan  * Septic shock, hypoxia, cholecystitis s/p cholecystostomy tube/bacteremia, GI bleed/hemorrhagic shock (HCC)- (present on admission)  Assessment & Plan  Combined septic/hemorragic shock\"    Stable blood pressures.  Await speciation of S. anginosus and E. Faecalis  ID consult when we have that data for antibiotic regimen.  Continue current antibiotics.    Pancreatic cancer (HCC)- (present on admission)  Assessment & Plan  Being treated at [facility] by Dr Villegas  Spoke with her 2/24 and updated her.  She agrees with current management  Pt has scheduled follow up in early March    Hemorrhagic shock (HCC)  Assessment & Plan  Now s/p resuscitation and has resolved  Cont to monitor    Emphysematous cholecystitis  Assessment & Plan  Now s/p cholecystostomy tube 2/23  Cultures showing enterobacter cloacae from blood: follow to final ID and sensitivities  Currently on Pip/Tazo    Hypokalemia- (present on admission)  Assessment & Plan  Replace IV/PO and follow    Goals of care, counseling/discussion- (present on admission)  Assessment & Plan  DW pt in person and his wife by phone  He had been going to comfort care as they both thought he was unlikely to survive however today he is feeling much better.  They now wish to try to get him through his acute illness until he can see Dr Villegas ([facility] Onc) in March to consider continuing chemo.  Based on discussion pt will remain DNR/I however cont to treat medical issues agressively \"    I spent time with him ad also spoke to Palliative.    Pancreatic cancer metastasized to liver (HCC)- (present on admission)  Assessment & Plan  Discussed goals of care  When infection improves he wants a trial with Dr. Villegas    Elevated alkaline phosphatase level- (present on " "admission)  Assessment & Plan  Likely malignant etiology  Cholecystostomy tube in place    Transaminitis- (present on admission)  Assessment & Plan  Improved now s/p cholecystostomy tube placement    Acute respiratory failure with hypoxia (HCC)- (present on admission)  Assessment & Plan  O2 requriements coming down as his sepsis and encephalopathy improve  Cont O2/RT protocols  Mobilize  Watch volume status\"    Weaned off O2.    Acute encephalopathy- (present on admission)  Assessment & Plan  Acute metabolic/infectious  Markedly improved today  Cont to address infectious and metabolic issues  Minimize sedating medications  Mobilize  Windows open\"    Seems to be at his baseline mentation    Acute upper GI bleeding- (present on admission)  Assessment & Plan  Clinically appears to have stopped bleeding  Cont IV PPI  Cont serial H&H  DW GI: pt is very high risk for intervention.  They feel medical management is the best approach at this time      Acute blood loss anemia- (present on admission)  Assessment & Plan  Clinically looks like an UGIB  Clinically appears to have stopped bleeding  Cont IV PPI  Cont serial H&H  DW GI: pt is very high risk for intervention.  They feel medical management is the best approach at this time\"    Recent Labs     02/23/23  0040 02/23/23  0643 02/23/23  1350 02/24/23  0430 02/24/23  1633 02/25/23  0615   HEMOGLOBIN 8.6* 8.7*   < > 9.1* 9.6* 9.3*   HEMATOCRIT 25.1* 24.9*  --   --   --  27.6*   MCV 87.2 86.2  --   --   --  89.9   MCH 29.9 30.1  --   --   --  30.3   PLATELETCT 110* 107*  --   --   --  151*    < > = values in this interval not displayed.     Stable  No active bleeding  GI f/u at some point.         VTE prophylaxis: SCDs/TEDs    I have performed a physical exam and reviewed and updated ROS and Plan today (2/25/2023). In review of yesterday's note (2/24/2023), there are no changes except as documented above.        "

## 2023-02-25 NOTE — CARE PLAN
The patient is Stable - Low risk of patient condition declining or worsening    Shift Goals  Clinical Goals: monitor hgb and drainage output  Patient Goals: pain control  Family Goals: Vistation and be kept informed on the patient's condition    Progress made toward(s) clinical / shift goals:  hgb 9.3    Patient is not progressing towards the following goals:

## 2023-02-25 NOTE — CARE PLAN
The patient is Watcher - Medium risk of patient condition declining or worsening    Shift Goals  Clinical Goals: monitor hgb, drain output  Patient Goals: pain control  Family Goals: Vistation and be kept informed on the patient's condition    Progress made toward(s) clinical / shift goals:        Problem: Pain - Standard  Goal: Alleviation of pain or a reduction in pain to the patient’s comfort goal  Outcome: Progressing     Problem: Knowledge Deficit - Standard  Goal: Patient and family/care givers will demonstrate understanding of plan of care, disease process/condition, diagnostic tests and medications  Outcome: Progressing     Problem: Hemodynamics  Goal: Patient's hemodynamics, fluid balance and neurologic status will be stable or improve  Outcome: Progressing     Problem: Physical Regulation  Goal: Diagnostic test results will improve  Outcome: Progressing     Problem: Physical Regulation  Goal: Signs and symptoms of infection will decrease  Outcome: Progressing     Problem: Skin Integrity  Goal: Skin integrity is maintained or improved  Outcome: Progressing     Problem: Psychosocial  Goal: Patient's ability to identify and develop effective coping behaviors will improve  Outcome: Progressing     Problem: Fall Risk  Goal: Patient will remain free from falls  Outcome: Progressing       Patient is not progressing towards the following goals:

## 2023-02-25 NOTE — PROGRESS NOTES
4 Eyes Skin Assessment Completed by ERIC Saldaña and ERIC Vasques.    Head WDL  Ears WDL  Nose WDL  Mouth WDL  Neck WDL  Breast/Chest WDL  Shoulder Blades WDL  Spine WDL  (R) Arm/Elbow/Hand WDL  (L) Arm/Elbow/Hand WDL  Abdomen WDL  Groin WDL  Scrotum/Coccyx/Buttocks Scar  (R) Leg WDL  (L) Leg WDL  (R) Heel/Foot/Toe WDL  (L) Heel/Foot/Toe WDL        Interventions In Place Pillows    Possible Skin Injury No    Pictures Uploaded Into Epic N/A  Wound Consult Placed N/A  RN Wound Prevention Protocol Ordered No

## 2023-02-25 NOTE — PROGRESS NOTES
[facility] Medicine Daily Progress Note    Date of Service  2/25/2023    Chief Complaint  Carl Finney is a 72 y.o. male admitted 2/21/2023 with ***    Hospital Course  No notes on file    Interval Problem Update  ***    I have discussed this patient's plan of care and discharge plan at IDT rounds today with Case Management, Nursing, Nursing leadership, and other members of the IDT team.    Consultants/Specialty  {Other providers:75348}    Code Status  DNAR/DNI    Disposition  Patient is {status of medical clearance:63206} for discharge.   Anticipate discharge to {disposition location:25703}.  I have placed the appropriate orders for post-discharge needs.    Review of Systems  ROS     Physical Exam  Temp:  [36.4 °C (97.6 °F)-37 °C (98.6 °F)] 36.6 °C (97.8 °F)  Pulse:  [63-80] 72  Resp:  [13-23] 18  BP: (102-124)/(57-83) 115/64  SpO2:  [92 %-96 %] 92 %    Physical Exam    Fluids    Intake/Output Summary (Last 24 hours) at 2/25/2023 0854  Last data filed at 2/25/2023 0421  Gross per 24 hour   Intake 799.82 ml   Output 1225 ml   Net -425.18 ml       Laboratory  Recent Labs     02/23/23  0040 02/23/23  0643 02/23/23  1350 02/24/23  0430 02/24/23  1633 02/25/23  0615   WBC 13.0* 15.4*  --   --   --  8.8   RBC 2.88* 2.89*  --   --   --  3.07*   HEMOGLOBIN 8.6* 8.7*   < > 9.1* 9.6* 9.3*   HEMATOCRIT 25.1* 24.9*  --   --   --  27.6*   MCV 87.2 86.2  --   --   --  89.9   MCH 29.9 30.1  --   --   --  30.3   MCHC 34.3 34.9  --   --   --  33.7   RDW 48.3 48.3  --   --   --  51.9*   PLATELETCT 110* 107*  --   --   --  151*   MPV 10.7 10.8  --   --   --  10.6    < > = values in this interval not displayed.     Recent Labs     02/23/23  0643 02/23/23  2158 02/24/23  0430 02/25/23  0615   SODIUM 140  --  140 137   POTASSIUM 3.4* 3.5* 3.3* 3.8   CHLORIDE 111  --  111 108   CO2 19*  --  21 19*   GLUCOSE 99  --  82 99   BUN 42*  --  31* 23*   CREATININE 0.70  --  0.67 0.72   CALCIUM 7.6*  --  7.7* 7.8*                    Imaging  {Wetread or Wildcard:481355}     Assessment/Plan  * Septic shock, hypoxia, cholecystitis s/p cholecystostomy tube/bacteremia, GI bleed/hemorrhagic shock (HCC)- (present on admission)  Assessment & Plan  Combined septic/hemorragic shock    Pancreatic cancer (HCC)- (present on admission)  Assessment & Plan  Being treated at [facility] by Dr Villegas  Spoke with her 2/24 and updated her.  She agrees with current management  Pt has scheduled follow up in early March    Hemorrhagic shock (HCC)  Assessment & Plan  Now s/p resuscitation and has resolved  Cont to monitor    Emphysematous cholecystitis  Assessment & Plan  Now s/p cholecystostomy tube 2/23  Cultures showing enterobacter cloacae from blood: follow to final ID and sensitivities  Currently on Pip/Tazo    Hypokalemia- (present on admission)  Assessment & Plan  Replace IV/PO and follow    Goals of care, counseling/discussion- (present on admission)  Assessment & Plan  DW pt in person and his wife by phone  He had been going to comfort care as they both thought he was unlikely to survive however today he is feeling much better.  They now wish to try to get him through his acute illness until he can see Dr Villegas ([facility] Onc) in March to consider continuing chemo.  Based on discussion pt will remain DNR/I however cont to treat medical issues agressively     Elevated alkaline phosphatase level- (present on admission)  Assessment & Plan  Likely malignant etiology  Cholecystostomy tube in place    Transaminitis- (present on admission)  Assessment & Plan  Improved now s/p cholecystostomy tube placement    Acute respiratory failure with hypoxia (HCC)- (present on admission)  Assessment & Plan  O2 requriements coming down as his sepsis and encephalopathy improve  Cont O2/RT protocols  Mobilize  Watch volume status    Acute encephalopathy- (present on admission)  Assessment & Plan  Acute metabolic/infectious  Markedly improved today  Cont to address infectious and  metabolic issues  Minimize sedating medications  Mobilize  Windows open    Acute upper GI bleeding- (present on admission)  Assessment & Plan  Clinically appears to have stopped bleeding  Cont IV PPI  Cont serial H&H  DW GI: pt is very high risk for intervention.  They feel medical management is the best approach at this time      Acute blood loss anemia- (present on admission)  Assessment & Plan  Clinically looks like an UGIB  Clinically appears to have stopped bleeding  Cont IV PPI  Cont serial H&H  DW GI: pt is very high risk for intervention.  They feel medical management is the best approach at this time         VTE prophylaxis: {VTE selection:89932}    I have performed a physical exam and reviewed and updated ROS and Plan today (2/25/2023). In review of yesterday's note (2/24/2023), there are no changes except as documented above.

## 2023-02-25 NOTE — CONSULTS
Reason for PC Consult: Advance Care Planning    Consulted by:   Dr. Stern    Assessment:  General:   72 year old male admitted for septic shock on 2/21/23. Pt has a history of pancreatic cancer with metastasis to liver, prostate cancer s/p prostatectomy, HTN, HLD, and gout. Pt presented to [facility] ED via EMS with altered level of consciousness and a high fever.    Prior PC Consult:   None on File    Social:   Pt lives with his wife in their home. He reports being independent with all ADLs at home. He is currently getting treatment at [facility] and was being considered for a trial study.    Dyspnea: No, 92% on RA  Last BM: 02/25/23    Pain: No    Depression: Mood appropriate for situation    Dementia: No      Spiritual:  Is Mandaen or spirituality important for coping with this illness? No, Pt declined visit from   Has a  or spiritual provider visit been requested? No    Palliative Performance Scale: 50%    Advance Directive: None on File    DPOA: None on File  POLST: None on File    To locate the AD/POLST, please hover the cursor over the patient's code status to find all linked ACP documents.    Code Status: DNR/DNI    Outcome:  PC RN visited pt at bedside, pt speaking with MD and discussing clinical picture and plan of care. Right now, plan is to finish antibiotics and once stable to discharge, pt will follow up with his Oncologist at [facility] regarding further chemotherapy or trial study.     Discussed pt's GOC, he is hopeful that he can continue with either chemotherapy or get started on a trial medication. Inquired if this were to not be an option, pt states he has already spoken with his PCP regarding PAS. He has a list of physicians and have done significant research. Pt reports his PCP is a physician that can do that.     Hospice Discussion: Discussed pt's understanding of hospice, he states it was discussed however he feels PAS is a better plan for him after speaking with his PCP.      Advanced Care Planning Documents: Pt reports he has completed documents, [facility] should have them on file.    Send fax to [facility] for a copy of ACP documents.    To locate the AD/POLST, please hover the cursor over the patient's code status to find all linked ACP documents.    Active listening, education, validation, normalization, therapeutic touch, and emotional support provided throughout encounter.    Updated:   PC team    Plan:   Continue current POC, plan to follow up with his oncology team at [facility] post discharge.    Thank you for allowing Palliative Care to participate in this patient's care. Please feel free to call [phone] with any questions or concerns.

## 2023-02-25 NOTE — THERAPY
Physical Therapy   Initial Evaluation     Patient Name: Carl Finney  Age:  72 y.o., Sex:  male  Medical Record #: [id number]  Today's Date: 2/24/2023     Precautions  Precautions: Fall Risk  Comments: jhonatan drain to right    Assessment  Pt presents with impaired activity tolerance, LE endurance/strength and balance associated with chief complaint of AMS, jaundice, found to be septic with severe anemia in setting of metastatic pancreatic CA to liver, prostectomy and kidney stones with recent chemo change; hospital course requiring cholecystostomy tube 2/23; Pt is very appropriate and organized in his thoughts and goals; slight fear of falling from having 3 'significant' bilateral knee lily to floor at home; pt reports he only wishes to be ambulatory and would not wish to be w/c bound. He was able to ambulate within room multiple times with FWW; given pt's overall wishes, anticipate home with home health will be best option and per conversation with wife on phone has an outpatient follow up appointment with his oncologist that she would like to keep before making further plans/decisions. Pt eager to maintain strength, if his goals change could certainly benefit from short stay rehab but not required at this time per pt wishes. Will follow to progress.     Plan    Physical Therapy Initial Treatment Plan   Treatment Plan : Bed Mobility, Equipment, Gait Training, Manual Therapy, Neuro Re-Education / Balance, Stair Training, Self Care / Home Evaluation, Therapeutic Activities, Therapeutic Exercise  Treatment Frequency: 4 Times per Week  Duration: Until Therapy Goals Met    DC Equipment Recommendations: Front-Wheel Walker  Discharge Recommendations: Recommend home health for continued physical therapy services       Abridged Subjective/Objective     02/24/23 1225   Prior Living Situation   Prior Services None   Housing / Facility 1 Story House   Steps Into Home 0   Steps In Home 1   Rail None   Equipment Owned  None   Lives with - Patient's Self Care Capacity Spouse   Comments spouse is retired; pt is the  but he just bought her a new car; has a follow up outpatient CA appointment in a week from dc per spouse on phone; reports 3 major falls since diagnosis 10/2022, thinks a w/c would fit in his house however he would not wish to continue life if he were not ambulatory;   Prior Level of Functional Mobility   Bed Mobility Independent   Transfer Status Independent   Ambulation Independent   Distance Ambulation (Feet)   (to tolerance)   Assistive Devices Used None   Stairs Independent   Comments reporting eating/appetite has been an issue in the past;   Cognition    Cognition / Consciousness WDL   Level of Consciousness Alert   Comments pleasant and cooperative;   Passive ROM Lower Body   Passive ROM Lower Body WDL   Strength Lower Body   Lower Body Strength  X   Gross Strength Generalized Weakness, Equal Bilaterally   Sensation Lower Body   Lower Extremity Sensation   WDL   Comments resolved neuropathy   Balance Assessment   Sitting Balance (Static) Fair +   Sitting Balance (Dynamic) Fair   Standing Balance (Static) Fair -   Standing Balance (Dynamic) Fair -   Weight Shift Sitting Good   Weight Shift Standing Fair   Comments B UE support in sititng/standing; no loss of balance but minimal tolerance likely to lateral perturbations   Bed Mobility    Supine to Sit   (NT, sitting in chair pre/post)   Gait Analysis   Gait Level Of Assist Contact Guard Assist   Assistive Device Front Wheel Walker   Distance (Feet) 50   # of Times Distance was Traveled 3   Deviation Bradykinetic;Decreased Base Of Support   Weight Bearing Status full   Vision Deficits Impacting Mobility none noted   Comments distance limited by pt/therapist, small laps back and forth to sink performed 3 times; siting for standing practice between 2nd and 3rd rep   Functional Mobility   Sit to Stand Contact Guard Assist  (with FWW,  for techqniue)   Bed,  Chair, Wheelchair Transfer Contact Guard Assist   Edema / Skin Assessment   Edema / Skin  X   Comments jhonatan drain on right c/d/i; pt reports stress incontinence for which he wears an incontinence pad   Patient / Family Goals    Patient / Family Goal #1 to improve strength   Short Term Goals    Short Term Goal # 1 Pt will perform supine<>sit from flat HOB/no railing with supervision within 6 visits to ensure independent mobility at home.   Short Term Goal # 2 Pt will perform sit<>stand with FWW and supervision within 6 visits to ensure independent mobility at home.   Short Term Goal # 3 Pt will ambulate x150ft wiht FWW and supervision within 6 visits to ensure independent mobility at home.   Short Term Goal # 4 Pt will ascend/descend 1 step with FWW and min A wtihin 6 visits to ensure household ambulation.   Education Group   Role of Physical Therapist Patient Response Patient;Acceptance;Explanation;Demonstration;Action Demonstration;Verbal Demonstration   Additional Comments sit<>stand daily goals; protein and water for energy and muscle integrity

## 2023-02-26 LAB
ALBUMIN SERPL BCP-MCNC: 2.2 G/DL (ref 3.2–4.9)
ALBUMIN/GLOB SERPL: 0.9 G/DL
ALP SERPL-CCNC: 303 U/L (ref 30–99)
ALT SERPL-CCNC: 23 U/L (ref 2–50)
ANION GAP SERPL CALC-SCNC: 8 MMOL/L (ref 7–16)
AST SERPL-CCNC: 21 U/L (ref 12–45)
BACTERIA BLD CULT: NORMAL
BACTERIA WND AEROBE CULT: ABNORMAL
BILIRUB SERPL-MCNC: 2 MG/DL (ref 0.1–1.5)
BUN SERPL-MCNC: 14 MG/DL (ref 8–22)
CALCIUM ALBUM COR SERPL-MCNC: 9.1 MG/DL (ref 8.5–10.5)
CALCIUM SERPL-MCNC: 7.7 MG/DL (ref 8.5–10.5)
CHLORIDE SERPL-SCNC: 108 MMOL/L (ref 96–112)
CO2 SERPL-SCNC: 20 MMOL/L (ref 20–33)
CREAT SERPL-MCNC: 0.66 MG/DL (ref 0.5–1.4)
ERYTHROCYTE [DISTWIDTH] IN BLOOD BY AUTOMATED COUNT: 52.9 FL (ref 35.9–50)
ERYTHROCYTE [DISTWIDTH] IN BLOOD BY AUTOMATED COUNT: 53.1 FL (ref 35.9–50)
GFR SERPLBLD CREATININE-BSD FMLA CKD-EPI: 100 ML/MIN/1.73 M 2
GLOBULIN SER CALC-MCNC: 2.5 G/DL (ref 1.9–3.5)
GLUCOSE SERPL-MCNC: 92 MG/DL (ref 65–99)
GRAM STN SPEC: ABNORMAL
HCT VFR BLD AUTO: 25 % (ref 42–52)
HCT VFR BLD AUTO: 26.5 % (ref 42–52)
HGB BLD-MCNC: 8.3 G/DL (ref 14–18)
HGB BLD-MCNC: 8.7 G/DL (ref 14–18)
MAGNESIUM SERPL-MCNC: 2 MG/DL (ref 1.5–2.5)
MCH RBC QN AUTO: 29.5 PG (ref 27–33)
MCH RBC QN AUTO: 29.6 PG (ref 27–33)
MCHC RBC AUTO-ENTMCNC: 32.8 G/DL (ref 33.7–35.3)
MCHC RBC AUTO-ENTMCNC: 33.2 G/DL (ref 33.7–35.3)
MCV RBC AUTO: 89 FL (ref 81.4–97.8)
MCV RBC AUTO: 90.1 FL (ref 81.4–97.8)
PHOSPHATE SERPL-MCNC: 2.5 MG/DL (ref 2.5–4.5)
PLATELET # BLD AUTO: 178 K/UL (ref 164–446)
PLATELET # BLD AUTO: 208 K/UL (ref 164–446)
PMV BLD AUTO: 9.5 FL (ref 9–12.9)
PMV BLD AUTO: 9.6 FL (ref 9–12.9)
POTASSIUM SERPL-SCNC: 3.4 MMOL/L (ref 3.6–5.5)
PROT SERPL-MCNC: 4.7 G/DL (ref 6–8.2)
RBC # BLD AUTO: 2.81 M/UL (ref 4.7–6.1)
RBC # BLD AUTO: 2.94 M/UL (ref 4.7–6.1)
SIGNIFICANT IND 70042: ABNORMAL
SIGNIFICANT IND 70042: NORMAL
SITE SITE: ABNORMAL
SITE SITE: NORMAL
SODIUM SERPL-SCNC: 136 MMOL/L (ref 135–145)
SOURCE SOURCE: ABNORMAL
SOURCE SOURCE: NORMAL
WBC # BLD AUTO: 5.7 K/UL (ref 4.8–10.8)
WBC # BLD AUTO: 6.5 K/UL (ref 4.8–10.8)

## 2023-02-26 PROCEDURE — 700111 HCHG RX REV CODE 636 W/ 250 OVERRIDE (IP): Performed by: INTERNAL MEDICINE

## 2023-02-26 PROCEDURE — 84100 ASSAY OF PHOSPHORUS: CPT

## 2023-02-26 PROCEDURE — 36415 COLL VENOUS BLD VENIPUNCTURE: CPT

## 2023-02-26 PROCEDURE — 99233 SBSQ HOSP IP/OBS HIGH 50: CPT | Performed by: INTERNAL MEDICINE

## 2023-02-26 PROCEDURE — 80053 COMPREHEN METABOLIC PANEL: CPT

## 2023-02-26 PROCEDURE — 700105 HCHG RX REV CODE 258: Performed by: INTERNAL MEDICINE

## 2023-02-26 PROCEDURE — 85027 COMPLETE CBC AUTOMATED: CPT

## 2023-02-26 PROCEDURE — 770006 HCHG ROOM/CARE - MED/SURG/GYN SEMI*

## 2023-02-26 PROCEDURE — 99223 1ST HOSP IP/OBS HIGH 75: CPT | Performed by: INTERNAL MEDICINE

## 2023-02-26 PROCEDURE — C9113 INJ PANTOPRAZOLE SODIUM, VIA: HCPCS | Performed by: INTERNAL MEDICINE

## 2023-02-26 PROCEDURE — 83735 ASSAY OF MAGNESIUM: CPT

## 2023-02-26 RX ADMIN — PANTOPRAZOLE SODIUM 40 MG: 40 INJECTION, POWDER, FOR SOLUTION INTRAVENOUS at 17:11

## 2023-02-26 RX ADMIN — PIPERACILLIN AND TAZOBACTAM 3.38 G: 3; .375 INJECTION, POWDER, LYOPHILIZED, FOR SOLUTION INTRAVENOUS; PARENTERAL at 23:26

## 2023-02-26 RX ADMIN — PIPERACILLIN AND TAZOBACTAM 3.38 G: 3; .375 INJECTION, POWDER, LYOPHILIZED, FOR SOLUTION INTRAVENOUS; PARENTERAL at 01:41

## 2023-02-26 RX ADMIN — PIPERACILLIN AND TAZOBACTAM 3.38 G: 3; .375 INJECTION, POWDER, LYOPHILIZED, FOR SOLUTION INTRAVENOUS; PARENTERAL at 17:13

## 2023-02-26 RX ADMIN — PANTOPRAZOLE SODIUM 40 MG: 40 INJECTION, POWDER, FOR SOLUTION INTRAVENOUS at 04:24

## 2023-02-26 RX ADMIN — PIPERACILLIN AND TAZOBACTAM 3.38 G: 3; .375 INJECTION, POWDER, LYOPHILIZED, FOR SOLUTION INTRAVENOUS; PARENTERAL at 09:57

## 2023-02-26 ASSESSMENT — ENCOUNTER SYMPTOMS
WHEEZING: 0
NAUSEA: 0
FALLS: 0
PALPITATIONS: 0
CONSTIPATION: 0
COUGH: 1
INSOMNIA: 0
MYALGIAS: 0
SHORTNESS OF BREATH: 0
DIARRHEA: 0
VOMITING: 0
FOCAL WEAKNESS: 0
NERVOUS/ANXIOUS: 0
EYE PAIN: 0
CHILLS: 0
EYE REDNESS: 0
FEVER: 0
TREMORS: 0
SEIZURES: 0
ABDOMINAL PAIN: 1
HEMOPTYSIS: 0
HEADACHES: 0
DIZZINESS: 0
BLOOD IN STOOL: 0
LOSS OF CONSCIOUSNESS: 0
WEAKNESS: 0

## 2023-02-26 NOTE — PROGRESS NOTES
"Hospital Medicine Daily Progress Note    Date of Service  2/26/2023    Chief Complaint  Carl Finney is a 72 y.o. male admitted 2/21/2023 with [facility]    Hospital Course  No notes on file  72 y.o. male who presented 2/21/2023 with previous medical history that includes hypertension, dyslipidemia, prostate cancer s/p prostatectomy, gout, recently diagnosed pancreatic cancer with mets to the liver in November of last year, s/p ERCP and biliary stent placement.  He was started on chemotherapy with gemcitabine and Abraxane by Dr. Villegas at [facility].  Patient presented for evaluation on February 21 with complaint of abdominal pain and nausea and vomiting over the previous 48 hours.  His emesis had been bloody.  In the ED was hypotensive and tachycardic with signs of hypoperfusion and diagnosed with shock.  His hemoglobin came back at 3.4.  Patient was transfused in the ED and admitted to the ICU with diagnosis of mixed shock from sepsis and GI bleed.  General surgery Dr. Oliver and gastroenterology Dr. Mike were consulted.     In the ICU CT was done which demonstrated evidence of cholecystitis.  He was started empirically on meropenem, and IR placed a cholecystostomy tube on February 23rd.  Blood cultures done on February 21 came back 1 of 2 positive for Enterobacter cloacae.  Subsequent cultures from blood and from the cholecystostomy tube and blood have been negative.     Patient's CODE STATUS is DNR/I     DW Dr Villegas [phone]: pt has follow up appointment with her in March.  She would like to see him in person before deciding on continued chemo\"    Dr. Herron. 2/24/2023    Interval Problem Update  2/25. I spoke with him. Palliative was also there briefly. Reviewed chart. He is doing well, baseline mentation. He has  ah history of metastatic pancreatic cancer and is planned for chemo trial once his infection is cleared. Currently he is on antibiotics for E. Cloacae bacteremia and S., aginosus, E. Cloacae and " E. Faecalis. Only sensitivitis for E> cloaca have been derived, rest are pending. I spoke with him regarding goals of care, he would like to treat his infection and see if he is a candidate for the trials. If not, then he is pursuing hospice nd he would like to be in [place] for that. Palliative ware.  2/26. Updated patient who is still doing well. Consulted ID for antibiotic recommendations.    I have discussed this patient's plan of care and discharge plan at IDT rounds today with Case Management, Nursing, Nursing leadership, and other members of the IDT team.    Consultants/Specialty  Intensivist admitted  Hospitalist  Surgery  GI    Code Status  DNAR/DNI    Disposition  Patient is not medically cleared for discharge.   Anticipate discharge to  D .  I have placed the appropriate orders for post-discharge needs.    Review of Systems  Review of Systems   Constitutional:  Positive for malaise/fatigue. Negative for chills and fever.   HENT:  Negative for congestion, hearing loss and nosebleeds.    Eyes:  Negative for pain and redness.   Respiratory:  Positive for cough. Negative for hemoptysis, shortness of breath and wheezing.    Cardiovascular:  Negative for chest pain and palpitations.   Gastrointestinal:  Positive for abdominal pain. Negative for blood in stool, constipation, diarrhea, nausea and vomiting.   Genitourinary:  Negative for dysuria, frequency and hematuria.   Musculoskeletal:  Negative for falls, joint pain and myalgias.   Skin:  Negative for rash.   Neurological:  Negative for dizziness, tremors, focal weakness, seizures, loss of consciousness, weakness and headaches.   Psychiatric/Behavioral:  The patient is not nervous/anxious and does not have insomnia.    All other systems reviewed and are negative.     Physical Exam  Temp:  [36.6 °C (97.9 °F)-37.1 °C (98.8 °F)] 36.8 °C (98.3 °F)  Pulse:  [73-77] 77  Resp:  [18-19] 19  BP: (119-131)/(63-72) 119/72  SpO2:  [90 %-95 %] 95 %    Physical  Exam  Vitals and nursing note reviewed.   Constitutional:       Comments: Thin   HENT:      Head: Normocephalic and atraumatic.      Right Ear: External ear normal.      Left Ear: External ear normal.      Nose: Nose normal.      Mouth/Throat:      Mouth: Mucous membranes are moist.   Eyes:      General: Scleral icterus present.      Conjunctiva/sclera: Conjunctivae normal.   Cardiovascular:      Rate and Rhythm: Normal rate and regular rhythm.      Heart sounds: No murmur heard.    No friction rub. No gallop.   Pulmonary:      Effort: Pulmonary effort is normal.      Breath sounds: Normal breath sounds.   Abdominal:      General: Abdomen is flat. Bowel sounds are normal. There is no distension.      Palpations: Abdomen is soft.      Tenderness: There is no abdominal tenderness. There is no guarding.   Musculoskeletal:         General: Normal range of motion.      Cervical back: Normal range of motion and neck supple.   Skin:     General: Skin is warm.      Coloration: Skin is jaundiced.   Neurological:      Mental Status: He is alert and oriented to person, place, and time. Mental status is at baseline.      Motor: Weakness (unchanged) present.   Psychiatric:         Mood and Affect: Mood normal.         Behavior: Behavior normal.         Thought Content: Thought content normal.         Judgment: Judgment normal.       Fluids    Intake/Output Summary (Last 24 hours) at 2/26/2023 1309  Last data filed at 2/26/2023 0900  Gross per 24 hour   Intake 1040 ml   Output 2700 ml   Net -1660 ml         Laboratory  Recent Labs     02/25/23  1553 02/25/23  1607 02/26/23  0406   WBC 6.8 7.2 6.5   RBC 2.91* 2.81* 2.94*   HEMOGLOBIN 8.9* 8.5* 8.7*   HEMATOCRIT 26.3* 24.9* 26.5*   MCV 90.4 88.6 90.1   MCH 30.6 30.2 29.6   MCHC 33.8 34.1 32.8*   RDW 53.0* 50.8* 52.9*   PLATELETCT 169 156* 178   MPV 10.2 10.4 9.5       Recent Labs     02/24/23  0430 02/25/23  0615 02/26/23  0406   SODIUM 140 137 136   POTASSIUM 3.3* 3.8 3.4*    CHLORIDE 111 108 108   CO2 21 19* 20   GLUCOSE 82 99 92   BUN 31* 23* 14   CREATININE 0.67 0.72 0.66   CALCIUM 7.7* 7.8* 7.7*                     Imaging  CT-DRAIN-CHOLECTYSTOSTOMY   Final Result      1. CT guided cholecystostomy with placement of an 8-[place] locking loop catheter.      2. Before catheter removal, a cholecystogram with cholangiogram would be recommended to assure patency of the common duct. Likewise, a tracto-gram may also be helpful to make sure that the catheter tract has matured so that there is not intraperitoneal    bile leakage after catheter removal.      CT-ABDOMEN-PELVIS WITH   Final Result   Addendum (preliminary) 1 of 1   Findings were discussed with Dr. Stern on 2/22/2023 6:28 AM.      Final      1.  Distended gallbladder with air in the gallbladder wall and pericholecystic fluid/fat stranding, highly suggestive of acute emphysematous cholecystitis.   2.  Pneumobilia, moderate biliary dilatation and CBD wall enhancement. A component of cholangitis is likely.   3.  A 3.2 cm pancreatic mass obstructing the CBD and pancreatic ducts and resulting in pancreatic atrophy.   4.  The mass abuts and narrows the portal vein. It also abuts the SMV and hepatic artery.   5.  Multiple hypoattenuation hepatic lesions. Some of them likely represents pancreatic cancer metastases, but some may represent small abscesses. They measure up to 1.4 cm. Consider evaluation with contrast-enhanced abdominal MRI.   6.  Metallic stent, located in the lumen of the sigmoid colon.   7.  Punctate nonobstructing left nephrolithiasis.   8.  Small volume ascites.      Attempts to contact patient's provider regarding potentially critical findings were initiated on 2/22/2023 0558 AM.      CT-HEAD W/O   Final Result      1. No CT evidence of acute infarct, hemorrhage or mass.   2. Mild global parenchymal atrophy. Chronic small vessel ischemic changes.      DX-CHEST-PORTABLE (1 VIEW)   Final Result      Mild bibasilar  "atelectasis.             Assessment/Plan  * Septic shock, hypoxia, cholecystitis s/p cholecystostomy tube/bacteremia, GI bleed/hemorrhagic shock (HCC)- (present on admission)  Assessment & Plan  Combined septic/hemorragic shock\"    Stable blood pressures.  E. Cloaca bacteremia and gallbladder infection, E. Faecalis and S. angionosus gallbladder infection  Consulted ID  Continue current antibiotics.    Pancreatic cancer (HCC)- (present on admission)  Assessment & Plan  Being treated at [facility] by Dr Villegas  Spoke with her 2/24 and updated her.  She agrees with current management  Pt has scheduled follow up in early March    Hemorrhagic shock (HCC)  Assessment & Plan  Now s/p resuscitation and has resolved  Cont to monitor    Emphysematous cholecystitis  Assessment & Plan  Now s/p cholecystostomy tube 2/23  Cultures showing enterobacter cloacae from blood: follow to final ID and sensitivities  Currently on Pip/Tazo    Hypokalemia- (present on admission)  Assessment & Plan  Replace IV/PO and follow    Goals of care, counseling/discussion- (present on admission)  Assessment & Plan  DW pt in person and his wife by phone  He had been going to comfort care as they both thought he was unlikely to survive however today he is feeling much better.  They now wish to try to get him through his acute illness until he can see Dr Villegas ([facility]) in March to consider continuing chemo.  Based on discussion pt will remain DNR/I however cont to treat medical issues agressively \"    I spent time with him ad also spoke to Palliative.    Pancreatic cancer metastasized to liver (HCC)- (present on admission)  Assessment & Plan  Discussed goals of care  When infection improves he wants a trial with Dr. Villegas    Elevated alkaline phosphatase level- (present on admission)  Assessment & Plan  Likely malignant etiology  Cholecystostomy tube in place    Transaminitis- (present on admission)  Assessment & Plan  Improved now s/p cholecystostomy " "tube placement    Acute respiratory failure with hypoxia (HCC)- (present on admission)  Assessment & Plan  O2 requriements coming down as his sepsis and encephalopathy improve  Cont O2/RT protocols  Mobilize  Watch volume status\"    Weaned off O2.    Acute encephalopathy- (present on admission)  Assessment & Plan  Acute metabolic/infectious  Markedly improved today  Cont to address infectious and metabolic issues  Minimize sedating medications  Mobilize  Windows open\"    Seems to be at his baseline mentation    Acute upper GI bleeding- (present on admission)  Assessment & Plan  Clinically appears to have stopped bleeding  Cont IV PPI  Cont serial H&H  DW GI: pt is very high risk for intervention.  They feel medical management is the best approach at this time      Acute blood loss anemia- (present on admission)  Assessment & Plan  Clinically looks like an UGIB  Clinically appears to have stopped bleeding  Cont IV PPI  Cont serial H&H  DW GI: pt is very high risk for intervention.  They feel medical management is the best approach at this time\"    Recent Labs     02/25/23  1553 02/25/23  1607 02/26/23  0406   HEMOGLOBIN 8.9* 8.5* 8.7*   HEMATOCRIT 26.3* 24.9* 26.5*   MCV 90.4 88.6 90.1   MCH 30.6 30.2 29.6   PLATELETCT 169 156* 178     Stable  No active bleeding  GI f/u at some point.         VTE prophylaxis: SCDs/TEDs    I have performed a physical exam and reviewed and updated ROS and Plan today (2/26/2023). In review of yesterday's note (2/25/2023), there are no changes except as documented above.        "

## 2023-02-26 NOTE — CARE PLAN
The patient is Stable - Low risk of patient condition declining or worsening    Shift Goals  Clinical Goals: monitor hgb, drain output  Patient Goals: pain control  Family Goals: Vistation and be kept informed on the patient's condition    Progress made toward(s) clinical / shift goals:        Problem: Pain - Standard  Goal: Alleviation of pain or a reduction in pain to the patient’s comfort goal  Outcome: Progressing     Problem: Knowledge Deficit - Standard  Goal: Patient and family/care givers will demonstrate understanding of plan of care, disease process/condition, diagnostic tests and medications  Outcome: Progressing     Problem: Hemodynamics  Goal: Patient's hemodynamics, fluid balance and neurologic status will be stable or improve  Outcome: Progressing     Problem: Physical Regulation  Goal: Diagnostic test results will improve  Outcome: Progressing     Problem: Skin Integrity  Goal: Skin integrity is maintained or improved  Outcome: Progressing     Problem: Psychosocial  Goal: Patient's ability to identify and develop effective coping behaviors will improve  Outcome: Progressing     Problem: Fall Risk  Goal: Patient will remain free from falls  Outcome: Progressing       Patient is not progressing towards the following goals:

## 2023-02-26 NOTE — CONSULTS
"INFECTIOUS DISEASES INPATIENT CONSULT NOTE     Date of Service:2/26/2023    Consult Requested By: Valeriy Juarez M.D.    Reason for Consultation: cholecystitis    History of Present Illness:   Carl Finney is a 72 y.o. male admitted to the ICU 2/21/2023 in hemorrhagic/septic shock-now transferred to .  He was \"recently diagnosed pancreatic cancer with mets to the liver in November of last year, s/p ERCP and biliary stent placement.  He was started on chemotherapy with gemcitabine and Abraxane by Dr. Villegas at [facility].\"  He was admitted with worsening abdominal pain, N/V X 48 hours.  + bloody.    In the ED was hypotensive 91/55 and tachycardic 115 Hemoglobin  3.4.    He was transfused in the ED and admitted to the ICU   General surgery Dr. Oliver and gastroenterology Dr. Mike were consulted.  CT showed cholecystitis.   He was empirically on meropenem and IR placed a cholecystostomy tube on February 23rd.    Blood cultures+ Enterobacter cloacae.  Subsequent cultures cholecystostomy tube and blood have polymicrobial with Ecloace, strep anginosis, and Efaecalis  Currently on Zosyn  Infectious Diseases consulted for antibiotic recommendation and management      Review Of Systems:  No fever    PMH:   Past Medical History:   Diagnosis Date    Arthritis     gout    Bowel habit changes     IBS    Cancer (HCC) 01/01/2016    prostate    Fall     History of sepsis 07/01/2017    Jaundice     Renal disorder     kidney stones    Urinary incontinence          PSH:  Past Surgical History:   Procedure Laterality Date    PERCUTANEOUS NEPHROSTOLITHOTOMY Left 8/28/2017    Procedure: PERCUTANEOUS NEPHROSTOLITHOTOMY;  Surgeon: Zaki Bustos M.D.;  Location: SURGERY [facility];  Service: Urology    CYSTOSCOPY STENT PLACEMENT Left 7/7/2017    Procedure: CYSTOSCOPY STENT PLACEMENT;  Surgeon: Hadley Garcia M.D.;  Location: SURGERY [facility];  Service:     RETROGRADES Right 7/7/2017    Procedure: RETROGRADES;  " Surgeon: Hadley Garcia M.D.;  Location: SURGERY [facility];  Service:     INGUINAL HERNIA REPAIR Right 9/30/2016    Procedure: INGUINAL HERNIA REPAIR;  Surgeon: Lisette Leos M.D.;  Location: SURGERY [facility];  Service:     OTHER ABDOMINAL SURGERY  3/9/2016    prostatectomy, Dr Christopher [facility]    OTHER ORTHOPEDIC SURGERY  1993    ACL left knee       FAMILY HX:  No ft pertinent to presentation    SOCIAL HX:  Social History     Socioeconomic History    Marital status:      Spouse name: Not on file    Number of children: Not on file    Years of education: Not on file    Highest education level: Not on file   Occupational History    Not on file   Tobacco Use    Smoking status: Never    Smokeless tobacco: Never   Vaping Use    Vaping Use: Never used   Substance and Sexual Activity    Alcohol use: No    Drug use: No    Sexual activity: Not on file   Other Topics Concern    Not on file   Social History Narrative    Not on file     Social Determinants of Health     Financial Resource Strain: Not on file   Food Insecurity: Not on file   Transportation Needs: Not on file   Physical Activity: Not on file   Stress: Not on file   Social Connections: Not on file   Intimate Partner Violence: Not on file   Housing Stability: Not on file     Social History     Tobacco Use   Smoking Status Never   Smokeless Tobacco Never     Social History     Substance and Sexual Activity   Alcohol Use No       Allergies/Intolerances:  Allergies   Allergen Reactions    Ceftriaxone Unspecified     Unable to speak and high fever  RXN=7/6/17         Other Current Medications:    Current Facility-Administered Medications:     pantoprazole (Protonix) injection 40 mg, 40 mg, Intravenous, BID, KIT GarnicaOPradip, 40 mg at 02/26/23 0424    acetaminophen (Tylenol) tablet 650 mg, 650 mg, Oral, Q4HRS PRN, Syd Brown D.O.    oxyCODONE immediate-release (ROXICODONE) tablet 5-10 mg, 5-10 mg, Oral, Q4HRS PRN, Syd  "ORIANA Brown    ondansetron (ZOFRAN) syringe/vial injection 4 mg, 4 mg, Intravenous, Q4HRS PRN, Leighann Ellison M.D., 4 mg at 23 0424    ondansetron (ZOFRAN ODT) dispertab 4 mg, 4 mg, Oral, Q4HRS PRN, Leighann Ellison M.D.    HYDROmorphone (Dilaudid) injection 0.5 mg, 0.5 mg, Intravenous, Q HOUR PRN, Leighann Ellison M.D., 0.5 mg at 23 1638    lactated ringers infusion (BOLUS), 500 mL, Intravenous, Once PRN, Leighann Ellison M.D.    [COMPLETED] piperacillin-tazobactam (Zosyn) 4.5 g in  mL IVPB, 4.5 g, Intravenous, Once, Stopped at 23 2323 **AND** piperacillin-tazobactam (Zosyn) 3.375 g in  mL IVPB, 3.375 g, Intravenous, Q8HR, Augustina Mcfadden M.D., Last Rate: 25 mL/hr at 23 0957, 3.375 g at 23 0957      Most Recent Vital Signs:  /72   Pulse 77   Temp 36.8 °C (98.3 °F) (Temporal)   Resp 19   Ht 1.88 m (6' 2\")   Wt 75.1 kg (165 lb 9.1 oz)   SpO2 95%   BMI 21.26 kg/m²   Temp  Av.7 °C (98.1 °F)  Min: 35.9 °C (96.7 °F)  Max: 37.8 °C (100 °F)    Physical Exam:  General: ill-appearing,  no acute distress  HEENT:  sclera icteric  Chest: CTAB, unlabored.  Cardiac: RRR  Abdomen: +RUQ drain      Pertinent Lab Results:  Recent Labs     23  1553 23  1607 23  0406   WBC 6.8 7.2 6.5      Recent Labs     23  1553 23  1607 23  0406   HEMOGLOBIN 8.9* 8.5* 8.7*   HEMATOCRIT 26.3* 24.9* 26.5*   MCV 90.4 88.6 90.1   MCH 30.6 30.2 29.6   PLATELETCT 169 156* 178         Recent Labs     23  0430 23  0615 23  0406   SODIUM 140 137 136   POTASSIUM 3.3* 3.8 3.4*   CHLORIDE 111 108 108   CO2 21 19* 20   CREATININE 0.67 0.72 0.66        Recent Labs     23  0430 23  0615 23  0406   ALBUMIN 1.9* 2.1* 2.2*        Pertinent Micro:  Results       Procedure Component Value Units Date/Time    CULTURE WOUND W/ GRAM STAIN [[id number]]  (Abnormal)  (Susceptibility) Collected: 23 1220    Order Status: " Completed Specimen: Wound Updated: 02/26/23 0959     Significant Indicator POS     Source WND     Site Gallbladder Drainage     Culture Result -     Gram Stain Result Many WBCs.  Many Gram positive cocci.       Culture Result Streptococcus anginosus  Heavy growth        Enterobacter cloacae complex  Light growth        Enterococcus faecalis  Light growth      Susceptibility       Enterobacter cloacae complex (2)       Antibiotic Interpretation Microscan   Method Status    Ampicillin Resistant >16 mcg/mL KATHY Final    Ceftriaxone Sensitive <=1 mcg/mL KATHY Final    Cefazolin Resistant >16 mcg/mL KATHY Final    Ciprofloxacin Sensitive <=0.25 mcg/mL KATHY Final    Cefepime Sensitive <=2 mcg/mL KATHY Final    Cefuroxime Sensitive <=4 mcg/mL KATHY Final    Ertapenem Sensitive <=0.5 mcg/mL KATHY Final    Gentamicin Sensitive <=2 mcg/mL KATHY Final    Ampicillin/sulbactam Resistant >16/8 mcg/mL KATHY Final    Minocycline Sensitive <=4 mcg/mL KATHY Final    Moxifloxacin Sensitive <=2 mcg/mL KATHY Final    Pip/Tazobactam Sensitive <=8 mcg/mL KATHY Final    Trimeth/Sulfa Sensitive <=0.5/9.5 mcg/mL KATHY Final    Tigecycline Sensitive <=2 mcg/mL KATHY Final    Tobramycin Sensitive <=2 mcg/mL KATHY Final              Enterococcus faecalis (4)       Antibiotic Interpretation Microscan   Method Status    Ampicillin Sensitive <=2 mcg/mL KATHY Final    Vancomycin Sensitive 1 mcg/mL KATHY Final    Daptomycin Sensitive 2 mcg/mL KATHY Final    Gent Synergy Sensitive <=500 mcg/mL KATHY Final    Penicillin Sensitive 2 mcg/mL KATHY Final                       Blood Culture [[id number]]  (Abnormal)  (Susceptibility) Collected: 02/21/23 1637    Order Status: Completed Specimen: Blood from Peripheral Updated: 02/25/23 0902     Significant Indicator POS     Source BLD     Site PERIPHERAL     Culture Result Growth detected by Bactec instrument. 02/22/2023  06:04      Enterobacter cloacae complex    Narrative:      CALL  [facility] tel. [phone],  CALLED  [facility] tel. [phone]  "02/22/2023, 06:12, RB PERF. RESULTS CALLED  TO:[id number]  Per Hospital Policy: Only change Specimen Src: to \"Line\" if  specified by physician order.  Right Forearm/Arm    Susceptibility       Enterobacter cloacae complex (2)       Antibiotic Interpretation Microscan   Method Status    Ampicillin Resistant >16 mcg/mL KATHY Final    Ceftriaxone Sensitive <=1 mcg/mL KATHY Final    Cefazolin Resistant >16 mcg/mL KATHY Final    Ciprofloxacin Sensitive <=0.25 mcg/mL KATHY Final    Cefepime Sensitive <=2 mcg/mL KATHY Final    Cefuroxime Sensitive 8 mcg/mL KATHY Final    Ertapenem Sensitive <=0.5 mcg/mL KATHY Final    Ampicillin/sulbactam Resistant 16/8 mcg/mL KATHY Final    Gentamicin Sensitive <=2 mcg/mL KATHY Final    Tobramycin Sensitive <=2 mcg/mL KATHY Final    Minocycline Sensitive <=4 mcg/mL KATHY Final    Moxifloxacin Sensitive <=2 mcg/mL KATHY Final    Pip/Tazobactam Sensitive <=8 mcg/mL KATHY Final    Trimeth/Sulfa Sensitive <=0.5/9.5 mcg/mL KATHY Final    Tigecycline Sensitive <=2 mcg/mL KATHY Final                       BLOOD CULTURE [[id number]] Collected: 02/23/23 1010    Order Status: Completed Specimen: Blood from Peripheral Updated: 02/24/23 0717     Significant Indicator NEG     Source BLD     Site PERIPHERAL     Culture Result No Growth  Note: Blood cultures are incubated for 5 days and  are monitored continuously.Positive blood cultures  are called to the RN and reported as soon as  they are identified.      Narrative:      Per Hospital Policy: Only change Specimen Src: to \"Line\" if  specified by physician order.  No site indicated    BLOOD CULTURE [[id number]] Collected: 02/23/23 1010    Order Status: Completed Specimen: Blood from Peripheral Updated: 02/24/23 0717     Significant Indicator NEG     Source BLD     Site PERIPHERAL     Culture Result No Growth  Note: Blood cultures are incubated for 5 days and  are monitored continuously.Positive blood cultures  are called to the RN and reported as soon as  they are identified.      " "Narrative:      Per Hospital Policy: Only change Specimen Src: to \"Line\" if  specified by physician order.  No site indicated    GRAM STAIN [[id number]] Collected: 02/23/23 1220    Order Status: Completed Specimen: Wound Updated: 02/23/23 1720     Significant Indicator .     Source WND     Site Gallbladder Drainage     Gram Stain Result Many WBCs.  Many Gram positive cocci.      CULTURE WOUND W/ GRAM STAIN [[id number]]     Order Status: Canceled Specimen: Wound from Gallbladder     Urine Culture (New) [[id number]] Collected: 02/21/23 1807    Order Status: Completed Specimen: Urine Updated: 02/23/23 0811     Significant Indicator NEG     Source UR     Site -     Culture Result Usual skin lolly <10,000 cfu/mL    Narrative:      Indication for culture:->Evaluation for sepsis without a  clear source of infection  Indication for culture:->Evaluation for sepsis without a    Blood Culture [[id number]] Collected: 02/21/23 1731    Order Status: Completed Specimen: Blood from Peripheral Updated: 02/22/23 0823     Significant Indicator NEG     Source BLD     Site PERIPHERAL     Culture Result No Growth  Note: Blood cultures are incubated for 5 days and  are monitored continuously.Positive blood cultures  are called to the RN and reported as soon as  they are identified.      Narrative:      Per Hospital Policy: Only change Specimen Src: to \"Line\" if  specified by physician order.  Right AC    MRSA By PCR (Amp) [[id number]] Collected: 02/21/23 2139    Order Status: Completed Specimen: Blood from Nares Updated: 02/21/23 2325     MRSA by PCR Negative    Urinalysis [[id number]]  (Abnormal) Collected: 02/21/23 1958    Order Status: Completed Specimen: Urine Updated: 02/21/23 2033     Color DK Yellow     Character Clear     Specific Gravity 1.015     Ph 5.0     Glucose Negative mg/dL      Ketones Negative mg/dL      Protein Negative mg/dL      Bilirubin Small     Urobilinogen, Urine 0.2     Nitrite Negative     Leukocyte Esterase Negative "     Occult Blood Negative     Micro Urine Req see below     Comment: Microscopic examination not performed when specimen is clear  and chemically negative for protein, blood, leukocyte esterase  and nitrite.         Narrative:      If not done within the last 24 hours    Blood Culture [[id number]]     Order Status: Canceled Specimen: Blood from Peripheral     Blood Culture [[id number]]     Order Status: Canceled Specimen: Blood from Peripheral     Urinalysis [[id number]]     Order Status: Canceled Specimen: Urine, Clean Catch     Blood Culture [[id number]]     Order Status: Canceled Specimen: Blood from Peripheral     Blood Culture [[id number]]     Order Status: Canceled Specimen: Blood from Peripheral     Urinalysis [[id number]]  (Abnormal) Collected: 02/21/23 1807    Order Status: Completed Specimen: Urine Updated: 02/21/23 1835     Color DK Yellow     Character Clear     Specific Gravity 1.015     Ph 5.0     Glucose Negative mg/dL      Ketones Negative mg/dL      Protein Negative mg/dL      Bilirubin Moderate     Urobilinogen, Urine 0.2     Nitrite Negative     Leukocyte Esterase Negative     Occult Blood Negative     Micro Urine Req see below     Comment: Microscopic examination not performed when specimen is clear  and chemically negative for protein, blood, leukocyte esterase  and nitrite.         Narrative:      Indication for culture:->Evaluation for sepsis without a  clear source of infection          Blood Culture   Date Value Ref Range Status   07/06/2017 No growth after 5 days of incubation.  Final        Studies:  CT 2/22     1.  Distended gallbladder with air in the gallbladder wall and pericholecystic fluid/fat stranding, highly suggestive of acute emphysematous cholecystitis.  2.  Pneumobilia, moderate biliary dilatation and CBD wall enhancement. A component of cholangitis is likely.  3.  A 3.2 cm pancreatic mass obstructing the CBD and pancreatic ducts and resulting in pancreatic atrophy.  4.  The mass  abuts and narrows the portal vein. It also abuts the SMV and hepatic artery.  5.  Multiple hypoattenuation hepatic lesions. Some of them likely represents pancreatic cancer metastases, but some may represent small abscesses. They measure up to 1.4 cm. Consider evaluation with contrast-enhanced abdominal MRI.  6.  Metallic stent, located in the lumen of the sigmoid colon.  7.  Punctate nonobstructing left nephrolithiasis.  8.  Small volume ascites.    IMPRESSION:   Pancreatic cancer, metastatic  Recent chemo  S/p hemorrhagic shock  Gram negative sepsis  Cholangitis/emphysematous jhonatan  Abnormal LFTs improving  Per CT stent in sigmoid colon    PLAN:   S/p CT drain on 2/23-culture polymicrobial  Blood cultures from 2/23 neg to date  Stop date Zosyn  3/1/2023 then can switch to PO Augmentin +Bactrim for suppression as long as tube in place    Palliative eval appreciated-Hospice vs PAS vs chemo  Plan of care discussed with MI Juarez M.D.. Will continue to follow    Augustina Mcfadden M.D.

## 2023-02-26 NOTE — CARE PLAN
The patient is Stable - Low risk of patient condition declining or worsening    Shift Goals  Clinical Goals: Hgb monitoring, drain output, IV ABX  Patient Goals: pain control and rest  Family Goals: STEVEN    Progress made toward(s) clinical / shift goals:  pt did not get injured this shift, pt ambulated the halls of the floor this shift, pt meds passed per MAR      Problem: Pain - Standard  Goal: Alleviation of pain or a reduction in pain to the patient’s comfort goal  Outcome: Progressing     Problem: Knowledge Deficit - Standard  Goal: Patient and family/care givers will demonstrate understanding of plan of care, disease process/condition, diagnostic tests and medications  Outcome: Progressing     Problem: Hemodynamics  Goal: Patient's hemodynamics, fluid balance and neurologic status will be stable or improve  Outcome: Progressing     Problem: Fluid Volume  Goal: Fluid volume balance will be maintained  Outcome: Progressing     Problem: Urinary - Renal Perfusion  Goal: Ability to achieve and maintain adequate renal perfusion and functioning will improve  Outcome: Progressing     Problem: Respiratory  Goal: Patient will achieve/maintain optimum respiratory ventilation and gas exchange  Outcome: Progressing     Problem: Physical Regulation  Goal: Diagnostic test results will improve  Outcome: Progressing  Goal: Signs and symptoms of infection will decrease  Outcome: Progressing     Problem: Skin Integrity  Goal: Skin integrity is maintained or improved  Outcome: Progressing     Problem: Provide Safe Environment  Goal: Suicide environmental safety, protocols, policies, and practices will be implemented  Outcome: Progressing     Problem: Psychosocial  Goal: Patient's ability to identify and develop effective coping behaviors will improve  Outcome: Progressing  Goal: Patient's ability to identify and utilize available support systems will improve  Outcome: Progressing     Problem: Fall Risk  Goal: Patient will remain  free from falls  Outcome: Progressing       Patient is not progressing towards the following goals:

## 2023-02-27 ENCOUNTER — APPOINTMENT (OUTPATIENT)
Dept: RADIOLOGY | Facility: MEDICAL CENTER | Age: 73
DRG: 871 | End: 2023-02-27
Attending: INTERNAL MEDICINE
Payer: MEDICARE

## 2023-02-27 LAB
ALBUMIN SERPL BCP-MCNC: 2.4 G/DL (ref 3.2–4.9)
ALBUMIN SERPL BCP-MCNC: 2.5 G/DL (ref 3.2–4.9)
ALBUMIN/GLOB SERPL: 1 G/DL
ALP SERPL-CCNC: 310 U/L (ref 30–99)
ALT SERPL-CCNC: 22 U/L (ref 2–50)
ANION GAP SERPL CALC-SCNC: 9 MMOL/L (ref 7–16)
AST SERPL-CCNC: 19 U/L (ref 12–45)
BILIRUB SERPL-MCNC: 2 MG/DL (ref 0.1–1.5)
BUN SERPL-MCNC: 12 MG/DL (ref 8–22)
BUN SERPL-MCNC: 13 MG/DL (ref 8–22)
CALCIUM ALBUM COR SERPL-MCNC: 9 MG/DL (ref 8.5–10.5)
CALCIUM ALBUM COR SERPL-MCNC: 9.1 MG/DL (ref 8.5–10.5)
CALCIUM SERPL-MCNC: 7.8 MG/DL (ref 8.5–10.5)
CALCIUM SERPL-MCNC: 7.8 MG/DL (ref 8.5–10.5)
CHLORIDE SERPL-SCNC: 108 MMOL/L (ref 96–112)
CHLORIDE SERPL-SCNC: 109 MMOL/L (ref 96–112)
CO2 SERPL-SCNC: 20 MMOL/L (ref 20–33)
CO2 SERPL-SCNC: 21 MMOL/L (ref 20–33)
CREAT SERPL-MCNC: 0.62 MG/DL (ref 0.5–1.4)
CREAT SERPL-MCNC: 0.64 MG/DL (ref 0.5–1.4)
ERYTHROCYTE [DISTWIDTH] IN BLOOD BY AUTOMATED COUNT: 53.9 FL (ref 35.9–50)
ERYTHROCYTE [DISTWIDTH] IN BLOOD BY AUTOMATED COUNT: 54.5 FL (ref 35.9–50)
GFR SERPLBLD CREATININE-BSD FMLA CKD-EPI: 101 ML/MIN/1.73 M 2
GFR SERPLBLD CREATININE-BSD FMLA CKD-EPI: 102 ML/MIN/1.73 M 2
GLOBULIN SER CALC-MCNC: 2.5 G/DL (ref 1.9–3.5)
GLUCOSE SERPL-MCNC: 105 MG/DL (ref 65–99)
GLUCOSE SERPL-MCNC: 111 MG/DL (ref 65–99)
HCT VFR BLD AUTO: 25 % (ref 42–52)
HCT VFR BLD AUTO: 25.9 % (ref 42–52)
HGB BLD-MCNC: 8.3 G/DL (ref 14–18)
HGB BLD-MCNC: 8.5 G/DL (ref 14–18)
MAGNESIUM SERPL-MCNC: 2 MG/DL (ref 1.5–2.5)
MCH RBC QN AUTO: 29.6 PG (ref 27–33)
MCH RBC QN AUTO: 30 PG (ref 27–33)
MCHC RBC AUTO-ENTMCNC: 32.8 G/DL (ref 33.7–35.3)
MCHC RBC AUTO-ENTMCNC: 33.2 G/DL (ref 33.7–35.3)
MCV RBC AUTO: 89.3 FL (ref 81.4–97.8)
MCV RBC AUTO: 91.5 FL (ref 81.4–97.8)
PHOSPHATE SERPL-MCNC: 2.5 MG/DL (ref 2.5–4.5)
PHOSPHATE SERPL-MCNC: 2.6 MG/DL (ref 2.5–4.5)
PLATELET # BLD AUTO: 215 K/UL (ref 164–446)
PLATELET # BLD AUTO: 255 K/UL (ref 164–446)
PMV BLD AUTO: 9.4 FL (ref 9–12.9)
PMV BLD AUTO: 9.5 FL (ref 9–12.9)
POTASSIUM SERPL-SCNC: 3.5 MMOL/L (ref 3.6–5.5)
POTASSIUM SERPL-SCNC: 3.5 MMOL/L (ref 3.6–5.5)
PROT SERPL-MCNC: 5 G/DL (ref 6–8.2)
RBC # BLD AUTO: 2.8 M/UL (ref 4.7–6.1)
RBC # BLD AUTO: 2.83 M/UL (ref 4.7–6.1)
SODIUM SERPL-SCNC: 137 MMOL/L (ref 135–145)
SODIUM SERPL-SCNC: 139 MMOL/L (ref 135–145)
WBC # BLD AUTO: 6.6 K/UL (ref 4.8–10.8)
WBC # BLD AUTO: 6.9 K/UL (ref 4.8–10.8)

## 2023-02-27 PROCEDURE — 80069 RENAL FUNCTION PANEL: CPT

## 2023-02-27 PROCEDURE — 99233 SBSQ HOSP IP/OBS HIGH 50: CPT | Performed by: INTERNAL MEDICINE

## 2023-02-27 PROCEDURE — 770006 HCHG ROOM/CARE - MED/SURG/GYN SEMI*

## 2023-02-27 PROCEDURE — 85027 COMPLETE CBC AUTOMATED: CPT

## 2023-02-27 PROCEDURE — 700111 HCHG RX REV CODE 636 W/ 250 OVERRIDE (IP): Performed by: INTERNAL MEDICINE

## 2023-02-27 PROCEDURE — 83735 ASSAY OF MAGNESIUM: CPT

## 2023-02-27 PROCEDURE — 80053 COMPREHEN METABOLIC PANEL: CPT

## 2023-02-27 PROCEDURE — 84100 ASSAY OF PHOSPHORUS: CPT

## 2023-02-27 PROCEDURE — C9113 INJ PANTOPRAZOLE SODIUM, VIA: HCPCS | Performed by: INTERNAL MEDICINE

## 2023-02-27 PROCEDURE — A9270 NON-COVERED ITEM OR SERVICE: HCPCS | Performed by: INTERNAL MEDICINE

## 2023-02-27 PROCEDURE — 36415 COLL VENOUS BLD VENIPUNCTURE: CPT

## 2023-02-27 PROCEDURE — 700105 HCHG RX REV CODE 258: Performed by: INTERNAL MEDICINE

## 2023-02-27 PROCEDURE — 700102 HCHG RX REV CODE 250 W/ 637 OVERRIDE(OP): Performed by: INTERNAL MEDICINE

## 2023-02-27 RX ORDER — OMEPRAZOLE 20 MG/1
20 CAPSULE, DELAYED RELEASE ORAL 2 TIMES DAILY
Status: DISCONTINUED | OUTPATIENT
Start: 2023-02-27 | End: 2023-03-01 | Stop reason: HOSPADM

## 2023-02-27 RX ADMIN — PIPERACILLIN AND TAZOBACTAM 3.38 G: 3; .375 INJECTION, POWDER, LYOPHILIZED, FOR SOLUTION INTRAVENOUS; PARENTERAL at 16:31

## 2023-02-27 RX ADMIN — OMEPRAZOLE 20 MG: 20 CAPSULE, DELAYED RELEASE ORAL at 18:28

## 2023-02-27 RX ADMIN — PIPERACILLIN AND TAZOBACTAM 3.38 G: 3; .375 INJECTION, POWDER, LYOPHILIZED, FOR SOLUTION INTRAVENOUS; PARENTERAL at 23:46

## 2023-02-27 RX ADMIN — PIPERACILLIN AND TAZOBACTAM 3.38 G: 3; .375 INJECTION, POWDER, LYOPHILIZED, FOR SOLUTION INTRAVENOUS; PARENTERAL at 08:35

## 2023-02-27 RX ADMIN — PANTOPRAZOLE SODIUM 40 MG: 40 INJECTION, POWDER, FOR SOLUTION INTRAVENOUS at 05:05

## 2023-02-27 ASSESSMENT — ENCOUNTER SYMPTOMS
SHORTNESS OF BREATH: 0
EYE PAIN: 0
TREMORS: 0
CHILLS: 0
BLOOD IN STOOL: 0
HEADACHES: 0
MYALGIAS: 0
SEIZURES: 0
EYE REDNESS: 0
FOCAL WEAKNESS: 0
VOMITING: 0
ABDOMINAL PAIN: 1
HEMOPTYSIS: 0
INSOMNIA: 0
FEVER: 0
PALPITATIONS: 0
FALLS: 0
DIARRHEA: 0
NERVOUS/ANXIOUS: 0
DIZZINESS: 0
CONSTIPATION: 0
COUGH: 1
WHEEZING: 0
LOSS OF CONSCIOUSNESS: 0
NAUSEA: 0
WEAKNESS: 0

## 2023-02-27 ASSESSMENT — PAIN DESCRIPTION - PAIN TYPE
TYPE: ACUTE PAIN
TYPE: ACUTE PAIN

## 2023-02-27 NOTE — PROGRESS NOTES
Infectious Disease Progress Note    Author: Augustina Mcfadden M.D. Date & Time of service: 2023  1:05 PM    Chief Complaint:  cholangitis    Interval History:  72 y.o. male with metastatic pancreatic cancer admitted to the ICU 2023 in hemorrhagic/septic shock    AF WBC 6.6 wants to now why jhonatan tube placed when he already has 2 stents  Labs Reviewed and Medications Reviewed.    Review of Systems:  Review of Systems   Constitutional:  Positive for malaise/fatigue. Negative for fever.   Gastrointestinal:  Positive for abdominal pain.   All other systems reviewed and are negative.    Hemodynamics:  Temp (24hrs), Av.5 °C (97.7 °F), Min:36.3 °C (97.3 °F), Max:36.7 °C (98 °F)  Temperature: 36.3 °C (97.3 °F)  Pulse  Av.7  Min: 56  Max: 152   Blood Pressure : 120/67       Physical Exam:  Physical Exam  Vitals and nursing note reviewed.   Constitutional:       General: He is not in acute distress.     Appearance: He is ill-appearing. He is not toxic-appearing or diaphoretic.   Eyes:      General: Scleral icterus present.   Abdominal:      General: There is no distension.      Palpations: Abdomen is soft.      Comments: Drain-dressing saturated with bile   Skin:     Coloration: Skin is jaundiced.      Findings: Bruising present.   Neurological:      Mental Status: He is alert.       Meds:    Current Facility-Administered Medications:     omeprazole    acetaminophen    oxyCODONE immediate-release    ondansetron    ondansetron    HYDROmorphone    LR    [COMPLETED] piperacillin-tazobactam **AND** piperacillin-tazobactam    Labs:  Recent Labs     23  0406 23  1815 23  0340   WBC 6.5 5.7 6.6   RBC 2.94* 2.81* 2.80*   HEMOGLOBIN 8.7* 8.3* 8.3*   HEMATOCRIT 26.5* 25.0* 25.0*   MCV 90.1 89.0 89.3   MCH 29.6 29.5 29.6   RDW 52.9* 53.1* 53.9*   PLATELETCT 178 208 215   MPV 9.5 9.6 9.5     Recent Labs     23  0406 23  0230 23  0340   SODIUM 136 137 139   POTASSIUM 3.4* 3.5*  3.5*   CHLORIDE 108 108 109   CO2 20 20 21   GLUCOSE 92 111* 105*   BUN 14 13 12     Recent Labs     02/25/23  0615 02/26/23  0406 02/27/23  0230 02/27/23  0340   ALBUMIN 2.1* 2.2* 2.4* 2.5*   TBILIRUBIN 2.3* 2.0*  --  2.0*   ALKPHOSPHAT 333* 303*  --  310*   TOTPROTEIN 4.6* 4.7*  --  5.0*   ALTSGPT 22 23  --  22   ASTSGOT 15 21  --  19   CREATININE 0.72 0.66 0.64 0.62       Imaging:  CT-ABDOMEN-PELVIS WITH    Addendum Date: 2/22/2023    Findings were discussed with Dr. Stern on 2/22/2023 6:28 AM.    Result Date: 2/22/2023 2/22/2023 3:54 AM HISTORY/REASON FOR EXAM:  Sepsis. TECHNIQUE/EXAM DESCRIPTION:   CT scan of the abdomen and pelvis with contrast. Contrast-enhanced helical scanning was obtained from the diaphragmatic domes through the pubic symphysis following the bolus administration of nonionic contrast without complication. 100 mL of Omnipaque 350 nonionic contrast was administered without complication. Low dose optimization technique was utilized for this CT exam including automated exposure control and adjustment of the mA and/or kV according to patient size. COMPARISON: None. FINDINGS: Lower chest: Atelectasis in the dependent lower lobes. Coronary calcifications. Liver: Periportal edema. Moderate intrahepatic biliary dilatation. Multiple hyperdense structures within the liver, some of them with associated low-attenuation halos. They measure up to 1.4 cm in the posterior right hepatic lobe. The majority of them are located in the periphery of the liver. Mild pneumobilia. Gallbladder: Distended, with thickened wall and air in the wall. Pericholecystic fat stranding and pericholecystic fluid. Common bile duct: Dilated, measuring up to 1.3 cm. Pancreas: Severely atrophic, with severe pancreatic ductal dilatation. Pancreatic duct measures 1 cm. A 3.2 x 2.7 cm mass in the pancreatic head with central 6 mm hyperdensity. It abuts the portal vein and SMV, and the results in mild narrowing of the portal vein. It  also abuts the hepatic artery. Spleen: No mass. Adrenals: No mass. Kidneys: No suspicious mass lesions. Punctate nonobstructing left renal stone. No hydronephrosis. Stomach, small bowel, colon: No bowel wall thickening or obstruction. Metallic stent in the lumen of the sigmoid colon. Colonic diverticulosis. Normal appendix. Peritoneal cavity: Small volume ascites. Lymph nodes: No enlarged nodes by size criteria. Aorta: No aneurysm. Atherosclerosis. Pelvic organs: Unremarkable. Musculoskeletal structures: No acute fracture or destructive lesion.     1.  Distended gallbladder with air in the gallbladder wall and pericholecystic fluid/fat stranding, highly suggestive of acute emphysematous cholecystitis. 2.  Pneumobilia, moderate biliary dilatation and CBD wall enhancement. A component of cholangitis is likely. 3.  A 3.2 cm pancreatic mass obstructing the CBD and pancreatic ducts and resulting in pancreatic atrophy. 4.  The mass abuts and narrows the portal vein. It also abuts the SMV and hepatic artery. 5.  Multiple hypoattenuation hepatic lesions. Some of them likely represents pancreatic cancer metastases, but some may represent small abscesses. They measure up to 1.4 cm. Consider evaluation with contrast-enhanced abdominal MRI. 6.  Metallic stent, located in the lumen of the sigmoid colon. 7.  Punctate nonobstructing left nephrolithiasis. 8.  Small volume ascites. Attempts to contact patient's provider regarding potentially critical findings were initiated on 2/22/2023 0558 AM.    CT-DRAIN-CHOLECTYSTOSTOMY    Result Date: 2/23/2023 2/23/2023 11:38 AM HISTORY/REASON FOR EXAM:  Sepsis. TECHNIQUE/EXAM DESCRIPTION AND NUMBER OF VIEWS: CT-guided Percutaneous cholecystostomy. COMPARISON:  None PROCEDURE:  Informed consent was obtained. CT radiation dose was minimized by utilizing dose lowering algorithms which modified KVP and MA to meet ALARA criteria. Moderate sedation was provided. Pulse oximetry and continuous  cardiac monitoring by the nurse was performed throughout the exam. Intraservice time was 30 minutes. Localizing CT images were performed identifying the gallbladder. The right upper quadrant was prepped and draped in a sterile manner. Following local anesthesia with 7 cc 1% lidocaine, a 18-gauge introducer needle was advanced into the gallbladder via a right intercostal transhepatic approach under 2 fluoroscopic guidance. A bile specimen of 10 cc was collected and will be submitted for culture and sensitivity, Gram stain, and cell count. The bile appearance was brownish and cloudy. An Amplatz guidewire was placed. An 8-Filipino dilator was placed. Next, an 8-Filipino locking loop [email or web address] pigtail catheter was placed. A cholecystogram was performed documenting satisfactory catheter position within the gallbladder with no extravasation. The catheter was secured to the skin and connected to gravity drainage. The patient tolerated the procedure well with no evidence of complication. FINDINGS: The gallbladder is identified. The CT images satisfactory catheter position within the gallbladder.     1. CT guided cholecystostomy with placement of an 8-Filipino locking loop catheter. 2. Before catheter removal, a cholecystogram with cholangiogram would be recommended to assure patency of the common duct. Likewise, a tracto-gram may also be helpful to make sure that the catheter tract has matured so that there is not intraperitoneal bile leakage after catheter removal.    CT-HEAD W/O    Result Date: 2/22/2023   CT HEAD WITHOUT CONTRAST 2/22/2023 3:54 AM HISTORY/REASON FOR EXAM:  Altered mental status TECHNIQUE/EXAM DESCRIPTION AND NUMBER OF VIEWS: CT of the head without contrast. The study was performed on a helical multidetector CT scanner. Contiguous 2.5 mm axial sections were obtained from the skull base through the vertex. Up to date radiation dose reduction adjustments have been utilized to meet ALARA standards for radiation  "dose reduction. COMPARISON:  7/6/2017 FINDINGS: Lateral ventricles are normal in size and symmetric. Mild global parenchymal atrophy. Chronic small vessel ischemic changes. No significant mass effect or midline shift. Basal cisterns are patent. No evidence for intracranial hemorrhage. Calvaria are intact. Visualized orbits are unremarkable. Visualized mastoid air cells are clear. No significant sinus disease in the visualized paranasal sinuses.     1. No CT evidence of acute infarct, hemorrhage or mass. 2. Mild global parenchymal atrophy. Chronic small vessel ischemic changes.    DX-CHEST-PORTABLE (1 VIEW)    Result Date: 2/21/2023 2/21/2023 4:23 PM HISTORY/REASON FOR EXAM:  possible sepsis.. Weakness TECHNIQUE/EXAM DESCRIPTION AND NUMBER OF VIEWS: Single portable view of the chest. COMPARISON: 7/6/2017 FINDINGS: There is a right-sided Port-A-Cath in place. Cardiomediastinal silhouette is stable. Mild bibasilar opacities likely atelectasis and/or scarring. No pleural effusion or pneumothorax. No acute osseous abnormality.     Mild bibasilar atelectasis.      Micro:  Results       Procedure Component Value Units Date/Time    Blood Culture [[id number]] Collected: 02/21/23 1731    Order Status: Completed Specimen: Blood from Peripheral Updated: 02/26/23 1900     Significant Indicator NEG     Source BLD     Site PERIPHERAL     Culture Result No growth after 5 days of incubation.    Narrative:      Per Hospital Policy: Only change Specimen Src: to \"Line\" if  specified by physician order.  Right AC    CULTURE WOUND W/ GRAM STAIN [[id number]]  (Abnormal)  (Susceptibility) Collected: 02/23/23 1220    Order Status: Completed Specimen: Wound Updated: 02/26/23 0959     Significant Indicator POS     Source WND     Site Gallbladder Drainage     Culture Result -     Gram Stain Result Many WBCs.  Many Gram positive cocci.       Culture Result Streptococcus anginosus  Heavy growth        Enterobacter cloacae complex  Light growth   " "     Enterococcus faecalis  Light growth      Susceptibility       Enterobacter cloacae complex (2)       Antibiotic Interpretation Microscan   Method Status    Ampicillin Resistant >16 mcg/mL KATHY Final    Ceftriaxone Sensitive <=1 mcg/mL KATHY Final    Cefazolin Resistant >16 mcg/mL KATHY Final    Ciprofloxacin Sensitive <=0.25 mcg/mL KATHY Final    Cefepime Sensitive <=2 mcg/mL KATHY Final    Cefuroxime Sensitive <=4 mcg/mL KATHY Final    Ertapenem Sensitive <=0.5 mcg/mL KATHY Final    Gentamicin Sensitive <=2 mcg/mL KATHY Final    Ampicillin/sulbactam Resistant >16/8 mcg/mL KATHY Final    Minocycline Sensitive <=4 mcg/mL KATHY Final    Moxifloxacin Sensitive <=2 mcg/mL KATHY Final    Pip/Tazobactam Sensitive <=8 mcg/mL KATHY Final    Trimeth/Sulfa Sensitive <=0.5/9.5 mcg/mL KATHY Final    Tigecycline Sensitive <=2 mcg/mL KATHY Final    Tobramycin Sensitive <=2 mcg/mL KATHY Final              Enterococcus faecalis (4)       Antibiotic Interpretation Microscan   Method Status    Ampicillin Sensitive <=2 mcg/mL KATHY Final    Vancomycin Sensitive 1 mcg/mL KATHY Final    Daptomycin Sensitive 2 mcg/mL KAHTY Final    Gent Synergy Sensitive <=500 mcg/mL KATHY Final    Penicillin Sensitive 2 mcg/mL KATHY Final                       Blood Culture [[id number]]  (Abnormal)  (Susceptibility) Collected: 02/21/23 1637    Order Status: Completed Specimen: Blood from Peripheral Updated: 02/25/23 0902     Significant Indicator POS     Source BLD     Site PERIPHERAL     Culture Result Growth detected by Bactec instrument. 02/22/2023  06:04      Enterobacter cloacae complex    Narrative:      CALL  [place]  [facility] tel. [phone],  CALLED  [facility] tel. [phone] 02/22/2023, 06:12, RB PERF. RESULTS CALLED  TO:[phone]  Per Hospital Policy: Only change Specimen Src: to \"Line\" if  specified by physician order.  Right Forearm/Arm    Susceptibility       Enterobacter cloacae complex (2)       Antibiotic Interpretation Microscan   Method Status    Ampicillin Resistant >16 mcg/mL KATHY " "Final    Ceftriaxone Sensitive <=1 mcg/mL KATHY Final    Cefazolin Resistant >16 mcg/mL KATHY Final    Ciprofloxacin Sensitive <=0.25 mcg/mL KATHY Final    Cefepime Sensitive <=2 mcg/mL KATHY Final    Cefuroxime Sensitive 8 mcg/mL KATHY Final    Ertapenem Sensitive <=0.5 mcg/mL KTAHY Final    Ampicillin/sulbactam Resistant 16/8 mcg/mL KATHY Final    Gentamicin Sensitive <=2 mcg/mL KATHY Final    Tobramycin Sensitive <=2 mcg/mL KATHY Final    Minocycline Sensitive <=4 mcg/mL KATHY Final    Moxifloxacin Sensitive <=2 mcg/mL KATHY Final    Pip/Tazobactam Sensitive <=8 mcg/mL KATHY Final    Trimeth/Sulfa Sensitive <=0.5/9.5 mcg/mL KATHY Final    Tigecycline Sensitive <=2 mcg/mL KATHY Final                       BLOOD CULTURE [[id number]] Collected: 02/23/23 1010    Order Status: Completed Specimen: Blood from Peripheral Updated: 02/24/23 0717     Significant Indicator NEG     Source BLD     Site PERIPHERAL     Culture Result No Growth  Note: Blood cultures are incubated for 5 days and  are monitored continuously.Positive blood cultures  are called to the RN and reported as soon as  they are identified.      Narrative:      Per Hospital Policy: Only change Specimen Src: to \"Line\" if  specified by physician order.  No site indicated    BLOOD CULTURE [[id number]] Collected: 02/23/23 1010    Order Status: Completed Specimen: Blood from Peripheral Updated: 02/24/23 0717     Significant Indicator NEG     Source BLD     Site PERIPHERAL     Culture Result No Growth  Note: Blood cultures are incubated for 5 days and  are monitored continuously.Positive blood cultures  are called to the RN and reported as soon as  they are identified.      Narrative:      Per Hospital Policy: Only change Specimen Src: to \"Line\" if  specified by physician order.  No site indicated    GRAM STAIN [[id number]] Collected: 02/23/23 1220    Order Status: Completed Specimen: Wound Updated: 02/23/23 1720     Significant Indicator .     Source WND     Site Gallbladder Drainage     Gram " Stain Result Many WBCs.  Many Gram positive cocci.      CULTURE WOUND W/ GRAM STAIN [[id number]]     Order Status: Canceled Specimen: Wound from Gallbladder     Urine Culture (New) [[id number]] Collected: 02/21/23 1807    Order Status: Completed Specimen: Urine Updated: 02/23/23 0811     Significant Indicator NEG     Source UR     Site -     Culture Result Usual skin lolly <10,000 cfu/mL    Narrative:      Indication for culture:->Evaluation for sepsis without a  clear source of infection  Indication for culture:->Evaluation for sepsis without a    MRSA By PCR (Amp) [[id number]] Collected: 02/21/23 2139    Order Status: Completed Specimen: Blood from Nares Updated: 02/21/23 2325     MRSA by PCR Negative    Urinalysis [[id number]]  (Abnormal) Collected: 02/21/23 1958    Order Status: Completed Specimen: Urine Updated: 02/21/23 2033     Color DK Yellow     Character Clear     Specific Gravity 1.015     Ph 5.0     Glucose Negative mg/dL      Ketones Negative mg/dL      Protein Negative mg/dL      Bilirubin Small     Urobilinogen, Urine 0.2     Nitrite Negative     Leukocyte Esterase Negative     Occult Blood Negative     Micro Urine Req see below     Comment: Microscopic examination not performed when specimen is clear  and chemically negative for protein, blood, leukocyte esterase  and nitrite.         Narrative:      If not done within the last 24 hours    Blood Culture [[id number]]     Order Status: Canceled Specimen: Blood from Peripheral     Blood Culture [[id number]]     Order Status: Canceled Specimen: Blood from Peripheral     Urinalysis [[id number]]     Order Status: Canceled Specimen: Urine, Clean Catch     Blood Culture [[id number]]     Order Status: Canceled Specimen: Blood from Peripheral     Blood Culture [[id number]]     Order Status: Canceled Specimen: Blood from Peripheral     Urinalysis [[id number]]  (Abnormal) Collected: 02/21/23 1807    Order Status: Completed Specimen: Urine Updated: 02/21/23 1835     Color  DK Yellow     Character Clear     Specific Gravity 1.015     Ph 5.0     Glucose Negative mg/dL      Ketones Negative mg/dL      Protein Negative mg/dL      Bilirubin Moderate     Urobilinogen, Urine 0.2     Nitrite Negative     Leukocyte Esterase Negative     Occult Blood Negative     Micro Urine Req see below     Comment: Microscopic examination not performed when specimen is clear  and chemically negative for protein, blood, leukocyte esterase  and nitrite.         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]     Hemorrhagic shock (HCC) [R57.8]     Acute blood loss anemia [D62]     Acute upper GI bleeding [K92.2]     Acute encephalopathy [G93.40]     Acute respiratory failure with hypoxia (HCC) [J96.01]     Transaminitis [R74.01]     Elevated alkaline phosphatase level [R74.8]     Pancreatic cancer metastasized to liver (HCC) [C25.9, C78.7]     Goals of care, counseling/discussion [Z71.89]     Hypokalemia [E87.6]      Pancreatic cancer, metastatic  Recent chemo  S/p hemorrhagic shock  Gram negative sepsis  Cholangitis/emphysematous jhonatan  Abnormal LFTs improving  Per CT stent in sigmoid colon     PLAN:   S/p CT drain on 2/23-culture polymicrobial  Blood cultures from 2/23 neg to date  Stop date Zosyn  3/1/2023 then can switch to PO Augmentin +Bactrim for suppression as long as tube in place  Defer to GI questions regarding stents/drain    Palliative eval appreciated-possible hospice  Will sign off-please reconsult if needed

## 2023-02-27 NOTE — PROGRESS NOTES
Palliative Care   No Advance Directive or POLST on record for patient at [facility].     PENNY GramajoRPradipN.  Palliative Care Nurse Practitioner  [phone]

## 2023-02-27 NOTE — CARE PLAN
The patient is Stable - Low risk of patient condition declining or worsening    Shift Goals  Clinical Goals: Monitor labs/drains  Patient Goals: Rest  Family Goals: STEVEN    Progress made toward(s) clinical / shift goals:    Problem: Knowledge Deficit - Standard  Goal: Patient and family/care givers will demonstrate understanding of plan of care, disease process/condition, diagnostic tests and medications  Outcome: Progressing  Note: Pt actively participates in POC. Pt and board updated, all questions and concerns answered. Pt encouraged to voice all questions and concerns.      Problem: Skin Integrity  Goal: Skin integrity is maintained or improved  Outcome: Progressing  Note: Dressing changes in place       Patient is not progressing towards the following goals:

## 2023-02-27 NOTE — PROGRESS NOTES
"Hospital Medicine Daily Progress Note    Date of Service  2/27/2023    Chief Complaint  Carl Finney is a 72 y.o. male admitted 2/21/2023 with [facility]    Hospital Course  No notes on file  72 y.o. male who presented 2/21/2023 with previous medical history that includes hypertension, dyslipidemia, prostate cancer s/p prostatectomy, gout, recently diagnosed pancreatic cancer with mets to the liver in November of last year, s/p ERCP and biliary stent placement.  He was started on chemotherapy with gemcitabine and Abraxane by Dr. Villegas at [facility].  Patient presented for evaluation on February 21 with complaint of abdominal pain and nausea and vomiting over the previous 48 hours.  His emesis had been bloody.  In the ED was hypotensive and tachycardic with signs of hypoperfusion and diagnosed with shock.  His hemoglobin came back at 3.4.  Patient was transfused in the ED and admitted to the ICU with diagnosis of mixed shock from sepsis and GI bleed.  General surgery Dr. Oliver and gastroenterology Dr. Mike were consulted.     In the ICU CT was done which demonstrated evidence of cholecystitis.  He was started empirically on meropenem, and IR placed a cholecystostomy tube on February 23rd.  Blood cultures done on February 21 came back 1 of 2 positive for Enterobacter cloacae.  Subsequent cultures from blood and from the cholecystostomy tube and blood have been negative.     Patient's CODE STATUS is DNR/I     DW Dr Villegas [phone]: pt has follow up appointment with her in March.  She would like to see him in person before deciding on continued chemo\"    Dr. Herron. 2/24/2023    Interval Problem Update  2/25. I spoke with him. Palliative was also there briefly. Reviewed chart. He is doing well, baseline mentation. He has  ah history of metastatic pancreatic cancer and is planned for chemo trial once his infection is cleared. Currently he is on antibiotics for E. Cloacae bacteremia and S., aginosus, E. Cloacae and " E. Faecalis. Only sensitivitis for E> cloaca have been derived, rest are pending. I spoke with him regarding goals of care, he would like to treat his infection and see if he is a candidate for the trials. If not, then he is pursuing hospice nd he would like to be in [place] for that. Palliative ware.  2/26. Updated patient who is still doing well. Consulted ID for antibiotic recommendations.  2/27. Nurse reports bilious leakage from the drain site. The drain itself is still draining bilious fluids. No leukocytosis.    I have discussed this patient's plan of care and discharge plan at IDT rounds today with Case Management, Nursing, Nursing leadership, and other members of the IDT team.    Consultants/Specialty  Intensivist admitted  Hospitalist  Surgery  GI    Code Status  DNAR/DNI    Disposition  Patient is not medically cleared for discharge.   Anticipate discharge to  D .  I have placed the appropriate orders for post-discharge needs.    Review of Systems  Review of Systems   Constitutional:  Positive for malaise/fatigue. Negative for chills and fever.   HENT:  Negative for congestion, hearing loss and nosebleeds.    Eyes:  Negative for pain and redness.   Respiratory:  Positive for cough. Negative for hemoptysis, shortness of breath and wheezing.    Cardiovascular:  Negative for chest pain and palpitations.   Gastrointestinal:  Positive for abdominal pain. Negative for blood in stool, constipation, diarrhea, nausea and vomiting.   Genitourinary:  Negative for dysuria, frequency and hematuria.   Musculoskeletal:  Negative for falls, joint pain and myalgias.   Skin:  Negative for rash.   Neurological:  Negative for dizziness, tremors, focal weakness, seizures, loss of consciousness, weakness and headaches.   Psychiatric/Behavioral:  The patient is not nervous/anxious and does not have insomnia.    All other systems reviewed and are negative.     Physical Exam  Temp:  [36.3 °C (97.3 °F)-36.7 °C (98 °F)] 36.3  °C (97.3 °F)  Pulse:  [69-80] 69  Resp:  [16-19] 16  BP: (111-125)/(57-67) 120/67  SpO2:  [95 %-97 %] 97 %    Physical Exam  Vitals and nursing note reviewed.   Constitutional:       Comments: Thin   HENT:      Head: Normocephalic and atraumatic.      Right Ear: External ear normal.      Left Ear: External ear normal.      Nose: Nose normal.      Mouth/Throat:      Mouth: Mucous membranes are moist.   Eyes:      General: Scleral icterus present.      Conjunctiva/sclera: Conjunctivae normal.   Cardiovascular:      Rate and Rhythm: Normal rate and regular rhythm.      Heart sounds: No murmur heard.    No friction rub. No gallop.   Pulmonary:      Effort: Pulmonary effort is normal.      Breath sounds: Normal breath sounds.   Abdominal:      General: Abdomen is flat. Bowel sounds are normal. There is no distension.      Palpations: Abdomen is soft.      Tenderness: There is no abdominal tenderness. There is no guarding.      Comments: Biliary drain with bilious output  Dressings soaked with bile   Musculoskeletal:         General: Normal range of motion.      Cervical back: Normal range of motion and neck supple.   Skin:     General: Skin is warm.      Coloration: Skin is jaundiced.   Neurological:      Mental Status: He is alert and oriented to person, place, and time. Mental status is at baseline.      Motor: Weakness (unchanged) present.   Psychiatric:         Mood and Affect: Mood normal.         Behavior: Behavior normal.         Thought Content: Thought content normal.         Judgment: Judgment normal.       Fluids    Intake/Output Summary (Last 24 hours) at 2/27/2023 1214  Last data filed at 2/27/2023 0830  Gross per 24 hour   Intake 480 ml   Output 860 ml   Net -380 ml         Laboratory  Recent Labs     02/26/23  0406 02/26/23  1815 02/27/23  0340   WBC 6.5 5.7 6.6   RBC 2.94* 2.81* 2.80*   HEMOGLOBIN 8.7* 8.3* 8.3*   HEMATOCRIT 26.5* 25.0* 25.0*   MCV 90.1 89.0 89.3   MCH 29.6 29.5 29.6   MCHC 32.8* 33.2*  33.2*   RDW 52.9* 53.1* 53.9*   PLATELETCT 178 208 215   MPV 9.5 9.6 9.5       Recent Labs     02/26/23  0406 02/27/23  0230 02/27/23  0340   SODIUM 136 137 139   POTASSIUM 3.4* 3.5* 3.5*   CHLORIDE 108 108 109   CO2 20 20 21   GLUCOSE 92 111* 105*   BUN 14 13 12   CREATININE 0.66 0.64 0.62   CALCIUM 7.7* 7.8* 7.8*                     Imaging  CT-DRAIN-CHOLECTYSTOSTOMY   Final Result      1. CT guided cholecystostomy with placement of an 8-Austrian locking loop catheter.      2. Before catheter removal, a cholecystogram with cholangiogram would be recommended to assure patency of the common duct. Likewise, a tracto-gram may also be helpful to make sure that the catheter tract has matured so that there is not intraperitoneal    bile leakage after catheter removal.      CT-ABDOMEN-PELVIS WITH   Final Result   Addendum (preliminary) 1 of 1   Findings were discussed with Dr. Stern on 2/22/2023 6:28 AM.      Final      1.  Distended gallbladder with air in the gallbladder wall and pericholecystic fluid/fat stranding, highly suggestive of acute emphysematous cholecystitis.   2.  Pneumobilia, moderate biliary dilatation and CBD wall enhancement. A component of cholangitis is likely.   3.  A 3.2 cm pancreatic mass obstructing the CBD and pancreatic ducts and resulting in pancreatic atrophy.   4.  The mass abuts and narrows the portal vein. It also abuts the SMV and hepatic artery.   5.  Multiple hypoattenuation hepatic lesions. Some of them likely represents pancreatic cancer metastases, but some may represent small abscesses. They measure up to 1.4 cm. Consider evaluation with contrast-enhanced abdominal MRI.   6.  Metallic stent, located in the lumen of the sigmoid colon.   7.  Punctate nonobstructing left nephrolithiasis.   8.  Small volume ascites.      Attempts to contact patient's provider regarding potentially critical findings were initiated on 2/22/2023 0558 AM.      CT-HEAD W/O   Final Result      1. No CT evidence  "of acute infarct, hemorrhage or mass.   2. Mild global parenchymal atrophy. Chronic small vessel ischemic changes.      DX-CHEST-PORTABLE (1 VIEW)   Final Result      Mild bibasilar atelectasis.      IR-CONSULT AND TREAT    (Results Pending)          Assessment/Plan  * Septic shock, hypoxia, cholecystitis s/p cholecystostomy tube/bacteremia, GI bleed/hemorrhagic shock (HCC)- (present on admission)  Assessment & Plan  Combined septic/hemorragic shock\"    Stable blood pressures.  E. Cloaca bacteremia and gallbladder infection, E. Faecalis and S. angionosus gallbladder infection  Consulted ID  Continue current antibiotics.  Updated Dr. Schwarz, Surgery who plans cholecystectomy outpatient and in a month or so  Called Dr. Mcdonnell, IR for drain upsizing and replacement  When patient has completed Abx course may trial on chemo.    Pancreatic cancer (HCC)- (present on admission)  Assessment & Plan  Being treated at [facility] by Dr Villegas  Spoke with her 2/24 and updated her.  She agrees with current management  Pt has scheduled follow up in early March    Hemorrhagic shock (HCC)  Assessment & Plan  Now s/p resuscitation and has resolved  Cont to monitor    Emphysematous cholecystitis  Assessment & Plan  Now s/p cholecystostomy tube 2/23  Cultures showing enterobacter cloacae from blood: follow to final ID and sensitivities  Currently on Pip/Tazo    Hypokalemia- (present on admission)  Assessment & Plan  Replace IV/PO and follow    Goals of care, counseling/discussion- (present on admission)  Assessment & Plan  DW pt in person and his wife by phone  He had been going to comfort care as they both thought he was unlikely to survive however today he is feeling much better.  They now wish to try to get him through his acute illness until he can see Dr Villegas ([facility]) in March to consider continuing chemo.  Based on discussion pt will remain DNR/I however cont to treat medical issues agressively \"    I spent time with him ad also " "spoke to Palliative.    Pancreatic cancer metastasized to liver (HCC)- (present on admission)  Assessment & Plan  Discussed goals of care  When infection improves he wants a trial with Dr. Villegas    Elevated alkaline phosphatase level- (present on admission)  Assessment & Plan  Likely malignant etiology  Cholecystostomy tube in place    Transaminitis- (present on admission)  Assessment & Plan  Improved now s/p cholecystostomy tube placement    Acute respiratory failure with hypoxia (HCC)- (present on admission)  Assessment & Plan  O2 requriements coming down as his sepsis and encephalopathy improve  Cont O2/RT protocols  Mobilize  Watch volume status\"    Weaned off O2.    Acute encephalopathy- (present on admission)  Assessment & Plan  Acute metabolic/infectious  Markedly improved today  Cont to address infectious and metabolic issues  Minimize sedating medications  Mobilize  Windows open\"    Seems to be at his baseline mentation    Acute upper GI bleeding- (present on admission)  Assessment & Plan  Clinically appears to have stopped bleeding  Cont IV PPI  Cont serial H&H  DW GI: pt is very high risk for intervention.  They feel medical management is the best approach at this time      Acute blood loss anemia- (present on admission)  Assessment & Plan  Clinically looks like an UGIB  Clinically appears to have stopped bleeding  Cont IV PPI  Cont serial H&H  DW GI: pt is very high risk for intervention.  They feel medical management is the best approach at this time\"    Recent Labs     02/26/23  0406 02/26/23  1815 02/27/23  0340   HEMOGLOBIN 8.7* 8.3* 8.3*   HEMATOCRIT 26.5* 25.0* 25.0*   MCV 90.1 89.0 89.3   MCH 29.6 29.5 29.6   PLATELETCT 178 208 215     Stable  No active bleeding  GI f/u at some point.         VTE prophylaxis: SCDs/TEDs    I have performed a physical exam and reviewed and updated ROS and Plan today (2/27/2023). In review of yesterday's note (2/26/2023), there are no changes except as documented " above.         Eye Protection Verbiage: Before proceeding with the stage, a plastic scleral shield was inserted. The globe was anesthetized with a few drops of 1% lidocaine with 1:100,000 epinephrine. Then, an appropriate sized scleral shield was chosen and coated with lacrilube ointment. The shield was gently inserted and left in place for the duration of each stage. After the stage was completed, the shield was gently removed.

## 2023-02-28 ENCOUNTER — APPOINTMENT (OUTPATIENT)
Dept: RADIOLOGY | Facility: MEDICAL CENTER | Age: 73
DRG: 871 | End: 2023-02-28
Attending: INTERNAL MEDICINE
Payer: MEDICARE

## 2023-02-28 LAB
ALBUMIN SERPL BCP-MCNC: 2.4 G/DL (ref 3.2–4.9)
ALBUMIN/GLOB SERPL: 1 G/DL
ALP SERPL-CCNC: 323 U/L (ref 30–99)
ALT SERPL-CCNC: 20 U/L (ref 2–50)
ANION GAP SERPL CALC-SCNC: 8 MMOL/L (ref 7–16)
AST SERPL-CCNC: 17 U/L (ref 12–45)
BACTERIA BLD CULT: NORMAL
BACTERIA BLD CULT: NORMAL
BILIRUB SERPL-MCNC: 2.5 MG/DL (ref 0.1–1.5)
BUN SERPL-MCNC: 10 MG/DL (ref 8–22)
CALCIUM ALBUM COR SERPL-MCNC: 9.4 MG/DL (ref 8.5–10.5)
CALCIUM SERPL-MCNC: 8.1 MG/DL (ref 8.5–10.5)
CHLORIDE SERPL-SCNC: 108 MMOL/L (ref 96–112)
CO2 SERPL-SCNC: 21 MMOL/L (ref 20–33)
CREAT SERPL-MCNC: 0.76 MG/DL (ref 0.5–1.4)
ERYTHROCYTE [DISTWIDTH] IN BLOOD BY AUTOMATED COUNT: 55.8 FL (ref 35.9–50)
ERYTHROCYTE [DISTWIDTH] IN BLOOD BY AUTOMATED COUNT: 56 FL (ref 35.9–50)
GFR SERPLBLD CREATININE-BSD FMLA CKD-EPI: 96 ML/MIN/1.73 M 2
GLOBULIN SER CALC-MCNC: 2.4 G/DL (ref 1.9–3.5)
GLUCOSE SERPL-MCNC: 93 MG/DL (ref 65–99)
HCT VFR BLD AUTO: 25.9 % (ref 42–52)
HCT VFR BLD AUTO: 26.8 % (ref 42–52)
HGB BLD-MCNC: 8.5 G/DL (ref 14–18)
HGB BLD-MCNC: 8.6 G/DL (ref 14–18)
MAGNESIUM SERPL-MCNC: 2.1 MG/DL (ref 1.5–2.5)
MCH RBC QN AUTO: 29.7 PG (ref 27–33)
MCH RBC QN AUTO: 29.9 PG (ref 27–33)
MCHC RBC AUTO-ENTMCNC: 32.1 G/DL (ref 33.7–35.3)
MCHC RBC AUTO-ENTMCNC: 32.8 G/DL (ref 33.7–35.3)
MCV RBC AUTO: 91.2 FL (ref 81.4–97.8)
MCV RBC AUTO: 92.4 FL (ref 81.4–97.8)
PHOSPHATE SERPL-MCNC: 3.1 MG/DL (ref 2.5–4.5)
PLATELET # BLD AUTO: 271 K/UL (ref 164–446)
PLATELET # BLD AUTO: 302 K/UL (ref 164–446)
PMV BLD AUTO: 9.4 FL (ref 9–12.9)
PMV BLD AUTO: 9.4 FL (ref 9–12.9)
POTASSIUM SERPL-SCNC: 3.8 MMOL/L (ref 3.6–5.5)
PROT SERPL-MCNC: 4.8 G/DL (ref 6–8.2)
RBC # BLD AUTO: 2.84 M/UL (ref 4.7–6.1)
RBC # BLD AUTO: 2.9 M/UL (ref 4.7–6.1)
SIGNIFICANT IND 70042: NORMAL
SIGNIFICANT IND 70042: NORMAL
SITE SITE: NORMAL
SITE SITE: NORMAL
SODIUM SERPL-SCNC: 137 MMOL/L (ref 135–145)
SOURCE SOURCE: NORMAL
SOURCE SOURCE: NORMAL
WBC # BLD AUTO: 6.3 K/UL (ref 4.8–10.8)
WBC # BLD AUTO: 8.6 K/UL (ref 4.8–10.8)

## 2023-02-28 PROCEDURE — 700102 HCHG RX REV CODE 250 W/ 637 OVERRIDE(OP): Performed by: INTERNAL MEDICINE

## 2023-02-28 PROCEDURE — 83735 ASSAY OF MAGNESIUM: CPT

## 2023-02-28 PROCEDURE — 97535 SELF CARE MNGMENT TRAINING: CPT | Mod: CO

## 2023-02-28 PROCEDURE — 700111 HCHG RX REV CODE 636 W/ 250 OVERRIDE (IP): Performed by: INTERNAL MEDICINE

## 2023-02-28 PROCEDURE — 47536 EXCHANGE BILIARY DRG CATH: CPT

## 2023-02-28 PROCEDURE — 97110 THERAPEUTIC EXERCISES: CPT | Mod: CQ

## 2023-02-28 PROCEDURE — 0F24X0Z CHANGE DRAINAGE DEVICE IN GALLBLADDER, EXTERNAL APPROACH: ICD-10-PCS | Performed by: STUDENT IN AN ORGANIZED HEALTH CARE EDUCATION/TRAINING PROGRAM

## 2023-02-28 PROCEDURE — 85027 COMPLETE CBC AUTOMATED: CPT | Mod: 91

## 2023-02-28 PROCEDURE — 99232 SBSQ HOSP IP/OBS MODERATE 35: CPT | Performed by: HOSPITALIST

## 2023-02-28 PROCEDURE — 97530 THERAPEUTIC ACTIVITIES: CPT | Mod: CQ

## 2023-02-28 PROCEDURE — 770006 HCHG ROOM/CARE - MED/SURG/GYN SEMI*

## 2023-02-28 PROCEDURE — 700105 HCHG RX REV CODE 258: Performed by: INTERNAL MEDICINE

## 2023-02-28 PROCEDURE — 84100 ASSAY OF PHOSPHORUS: CPT

## 2023-02-28 PROCEDURE — A9270 NON-COVERED ITEM OR SERVICE: HCPCS | Performed by: INTERNAL MEDICINE

## 2023-02-28 PROCEDURE — 700117 HCHG RX CONTRAST REV CODE 255: Performed by: INTERNAL MEDICINE

## 2023-02-28 PROCEDURE — 80053 COMPREHEN METABOLIC PANEL: CPT

## 2023-02-28 RX ORDER — SODIUM CHLORIDE 9 MG/ML
500 INJECTION, SOLUTION INTRAVENOUS
Status: ACTIVE | OUTPATIENT
Start: 2023-02-28 | End: 2023-02-28

## 2023-02-28 RX ORDER — MIDAZOLAM HYDROCHLORIDE 1 MG/ML
.5-2 INJECTION INTRAMUSCULAR; INTRAVENOUS PRN
Status: ACTIVE | OUTPATIENT
Start: 2023-02-28 | End: 2023-02-28

## 2023-02-28 RX ORDER — ONDANSETRON 2 MG/ML
4 INJECTION INTRAMUSCULAR; INTRAVENOUS PRN
Status: ACTIVE | OUTPATIENT
Start: 2023-02-28 | End: 2023-02-28

## 2023-02-28 RX ADMIN — OMEPRAZOLE 20 MG: 20 CAPSULE, DELAYED RELEASE ORAL at 06:28

## 2023-02-28 RX ADMIN — PIPERACILLIN AND TAZOBACTAM 3.38 G: 3; .375 INJECTION, POWDER, LYOPHILIZED, FOR SOLUTION INTRAVENOUS; PARENTERAL at 08:38

## 2023-02-28 RX ADMIN — IOHEXOL 10 ML: 300 INJECTION, SOLUTION INTRAVENOUS at 12:30

## 2023-02-28 RX ADMIN — OMEPRAZOLE 20 MG: 20 CAPSULE, DELAYED RELEASE ORAL at 17:18

## 2023-02-28 RX ADMIN — PIPERACILLIN AND TAZOBACTAM 3.38 G: 3; .375 INJECTION, POWDER, LYOPHILIZED, FOR SOLUTION INTRAVENOUS; PARENTERAL at 17:23

## 2023-02-28 ASSESSMENT — ENCOUNTER SYMPTOMS
INSOMNIA: 0
TREMORS: 0
ABDOMINAL PAIN: 1
SEIZURES: 0
SHORTNESS OF BREATH: 0
BLOOD IN STOOL: 0
LOSS OF CONSCIOUSNESS: 0
FOCAL WEAKNESS: 0
FALLS: 0
CHILLS: 0
DIZZINESS: 0
MYALGIAS: 0
NERVOUS/ANXIOUS: 0
NAUSEA: 0
COUGH: 1
WHEEZING: 0
HEMOPTYSIS: 0
FEVER: 0
VOMITING: 0
HEADACHES: 0
EYE REDNESS: 0
WEAKNESS: 0
PALPITATIONS: 0
DIARRHEA: 0
CONSTIPATION: 0
EYE PAIN: 0

## 2023-02-28 ASSESSMENT — COGNITIVE AND FUNCTIONAL STATUS - GENERAL
DAILY ACTIVITIY SCORE: 24
MOBILITY SCORE: 24
SUGGESTED CMS G CODE MODIFIER DAILY ACTIVITY: CH
SUGGESTED CMS G CODE MODIFIER MOBILITY: CH

## 2023-02-28 ASSESSMENT — GAIT ASSESSMENTS
DISTANCE (FEET): 200
GAIT LEVEL OF ASSIST: INDEPENDENT

## 2023-02-28 ASSESSMENT — PAIN DESCRIPTION - PAIN TYPE
TYPE: ACUTE PAIN
TYPE: ACUTE PAIN

## 2023-02-28 NOTE — DOCUMENTATION QUERY
[facility]                                                                       Query Response Note      PATIENT:               ANITA HARRISON  ACCT #:                  [id number]  MRN:                     [id number]  :                      1950  ADMIT DATE:       2023 3:53 PM  DISCH DATE:          RESPONDING  PROVIDER #:        [id number]           QUERY TEXT:    Based upon your review of the clinical indicators provided, please select and document the most appropriate diagnosis:    The patient's Clinical Indicators include:   Dietary Eval:  severe malnutrition in the context of chronic illness r/t pancreatic cancer as evidenced by severe muscle and severe fat wasting, and 10.8% severe wt loss in 3 months  Risk Factors: pancreatic cancer, severe wt loss, severe muscle/ fat wasting  Treatment: dietary eval, Diet per MD, monitor weight, dietary to order boost if within diet parameters    Thank you,  Birgit Zarate RN, BSN  Clinical   Connect via [employer]  Options provided:   -- Severe protein calorie malnutrition   -- Moderate protein calorie malnutrition   -- Mild protein calorie malnutrition   -- Other explanation, please specify   -- Findings of no clinical significance   -- Unable to determine      Query created by: Birgit Zarate on 2023 8:56 AM    RESPONSE TEXT:    Severe protein calorie malnutrition          Electronically signed by:  TIARA WISE MD 2023 9:03 AM

## 2023-02-28 NOTE — OR SURGEON
Immediate Post- Operative Note        Findings: Obstructed common bile duct.      Procedure(s): Cholecystogram and drain upsize      Estimated Blood Loss: Less than 5 ml        Complications: None            2/28/2023     12:13 PM     Mannie Mcdonnell M.D.

## 2023-02-28 NOTE — CARE PLAN
The patient is Stable - Low risk of patient condition declining or worsening    Shift Goals  Clinical Goals: NPO at midnight, NKECHI drain maintained      Progress made toward(s) clinical / shift goals:  maintained NPO, NKECHI drain leakage minimal.       Problem: Pain - Standard  Goal: Alleviation of pain or a reduction in pain to the patient’s comfort goal  Outcome: Progressing     Problem: Knowledge Deficit - Standard  Goal: Patient and family/care givers will demonstrate understanding of plan of care, disease process/condition, diagnostic tests and medications  Outcome: Progressing     Problem: Urinary - Renal Perfusion  Goal: Ability to achieve and maintain adequate renal perfusion and functioning will improve  Outcome: Progressing

## 2023-02-28 NOTE — PROGRESS NOTES
Patient underwent a Exchange and/or upsize of existing cholecystostomy Drain  by Dr. Mcdonnell. NO SEDATION ORDERED OR USED FOR PROCEDURE. Time out performed at 1158. Procedure site marked by MD and verified using imaging guidance. Pt placed in supine position for procedure, pressure points checked, padded, and monitored appropriately. Vitals taken every 5 minutes and remained stable during procedure (see doc flowsheet). CO2 waveform capnography utilized for patient monitoring and remained WNL throughout procedure. A gauze and medipore  dressing placed over surgical site, CDI. Report called to Claudette, RN. Pt transported via gurney with RN on monitor to S616.      Flexima APDL Locking Pigtail Drainage Catheter System [id number]    REF: [id number]  LOT: [id number]  EXP: 11/08/2025

## 2023-02-28 NOTE — THERAPY
Occupational Therapy  Discharge      Patient Name: Carl Finney  Age:  72 y.o., Sex:  male  Medical Record #: [id number]  Today's Date: 2/28/2023         Assessment    Pt seen for OT tx. Pt up amb in hallway w/o staff pushing IV pole. Discussed role of OT and pt reports that he was able to fully dress himself and complete ADLs and ADL transfers w/o assist from staff. Per RN pt up self in room and hallways. Pt perseverating on not being able to eat in preparation for procedure. At this time pt has met current goals and will no longer be followed for OT services while in house.     Plan    Reason for Discharge From Therapy: Discharge Secondary to Goals Met    DC Equipment Recommendations: Tub / Shower Seat, Hand Held Shower  Discharge Recommendations: Recommend home health for continued occupational therapy services       02/28/23 0945   Balance   Sitting Balance (Static) Good   Sitting Balance (Dynamic) Good   Standing Balance (Static) Good   Standing Balance (Dynamic) Good   Weight Shift Sitting Good   Weight Shift Standing Good   Bed Mobility    Supine to Sit Independent   Sit to Supine Independent   Activities of Daily Living   Grooming Independent   Upper Body Dressing Independent   Lower Body Dressing Independent   Toileting Independent   Functional Mobility   Sit to Stand Independent   Bed, Chair, Wheelchair Transfer Independent   Toilet Transfers Independent   Short Term Goals   Short Term Goal # 1 Pt will complete ADL transfers with supv   Goal Outcome # 1 Goal met   Short Term Goal # 2 Pt will complete FB dressing with supv   Goal Outcome # 2 Goal met   Short Term Goal # 3 Pt will complete standing grooming with supv   Goal Outcome # 3 Goal met   Occupational Therapy Treatment Plan    O.T. Treatment Plan Modify Current Treatment Plan   Reason For Discharge Discharge Secondary to Goals Met

## 2023-02-28 NOTE — PROGRESS NOTES
Pt. Received in bed awake, orientedx4. Denies any complaint of pain at the time of assessment. Intact port and RUQ cholecystostomy drain, flushed per order. New dressings in place. Npo at midnight pending IR procedure in AM, pt. Aware and  verbalized understanding. Port intact. No noted active bleed. Uses urinal with yellow output. Needs attended.

## 2023-02-28 NOTE — PROGRESS NOTES
Pt. Refuses Bed Alarm on, he is A&Ox4. Educated on the importance of using the equipment to prevent unnecessary falls. Understood the teaching but still refuses BA. Instructed on the use of call light button, and make sure to call for assistance when attempting to get out of bed if needed. Demonstrated proper use of call light button.

## 2023-02-28 NOTE — PROGRESS NOTES
"Hospital Medicine Daily Progress Note    Date of Service  2/28/2023    Chief Complaint  Carl Finney is a 72 y.o. male admitted 2/21/2023 with [facility]    Hospital Course    Patient is a 72 year old male with history of hypertension, dyslipidemia, prostate cancer s/p prostatectomy, gout, recently diagnosed pancreatic cancer with mets to the liver in November of last year, s/p ERCP and biliary stent placement.  He was started on chemotherapy with gemcitabine and Abraxane by Dr. Villegas at [facility].  Patient presented to [facility] on 2/21/23 with complaints of abdominal pain, nausea and vomiting  His emesis had been bloody.  In the ED was hypotensive and tachycardic with signs of hypoperfusion and diagnosed with shock.  His hemoglobin was found to be profoundly low at 3.4.  Patient was transfused in the ED and admitted to the ICU with diagnosis of mixed shock from sepsis and GI bleed.  General surgery Dr. Oliver and gastroenterology Dr. Mike were consulted.     In the ICU CT was done which demonstrated evidence of cholecystitis.  He was started empirically on meropenem, and IR placed a cholecystostomy tube on February 23rd.  Blood cultures done on February 21 came back 1 of 2 positive for Enterobacter cloacae.  Subsequent cultures from blood and from the cholecystostomy tube and blood have been negative.     Patient's CODE STATUS is DNR/I     Previous provider contacted Dr Vlilegas [phone]: patient has follow up appointment with her in March. She would like to see him in person before deciding on continued chemo      Interval Problem Update  Axox3, patient is very upset, states he is \"livid\" that IR scheduling has not been better, he is very concerned about decreased nutrition while waiting for IR. They are planning to take him in the next hour to place a larger drain and his diet will be resumed after the procedure. He denies abdominal pain, eager to d/c home. Stable on room air. ROS otherwise negative.     I " have discussed this patient's plan of care and discharge plan at IDT rounds today with Case Management, Nursing, Nursing leadership, and other members of the IDT team.    Consultants/Specialty  Intensivist admitted  Hospitalist  Surgery  GI    Code Status  DNAR/DNI    Disposition  Patient is not medically cleared for discharge.   Anticipate discharge to  TBD .  I have placed the appropriate orders for post-discharge needs.    Review of Systems  Review of Systems   Constitutional:  Positive for malaise/fatigue. Negative for chills and fever.   HENT:  Negative for congestion, hearing loss and nosebleeds.    Eyes:  Negative for pain and redness.   Respiratory:  Positive for cough. Negative for hemoptysis, shortness of breath and wheezing.    Cardiovascular:  Negative for chest pain and palpitations.   Gastrointestinal:  Positive for abdominal pain. Negative for blood in stool, constipation, diarrhea, nausea and vomiting.   Genitourinary:  Negative for dysuria, frequency and hematuria.   Musculoskeletal:  Negative for falls, joint pain and myalgias.   Skin:  Negative for rash.   Neurological:  Negative for dizziness, tremors, focal weakness, seizures, loss of consciousness, weakness and headaches.   Psychiatric/Behavioral:  The patient is not nervous/anxious and does not have insomnia.    All other systems reviewed and are negative.     Physical Exam  Temp:  [36.6 °C (97.8 °F)-37 °C (98.6 °F)] 36.7 °C (98.1 °F)  Pulse:  [65-82] 65  Resp:  [15-18] 16  BP: (104-131)/(57-68) 125/67  SpO2:  [93 %-99 %] 98 %    Physical Exam  Vitals and nursing note reviewed.   Constitutional:       Comments: Thin   HENT:      Head: Normocephalic and atraumatic.      Right Ear: External ear normal.      Left Ear: External ear normal.      Nose: Nose normal.      Mouth/Throat:      Mouth: Mucous membranes are moist.   Eyes:      General: Scleral icterus present.      Conjunctiva/sclera: Conjunctivae normal.   Cardiovascular:      Rate and  Rhythm: Normal rate and regular rhythm.      Heart sounds: No murmur heard.    No friction rub. No gallop.   Pulmonary:      Effort: Pulmonary effort is normal.      Breath sounds: Normal breath sounds.   Abdominal:      General: Abdomen is flat. Bowel sounds are normal. There is no distension.      Palpations: Abdomen is soft.      Tenderness: There is no abdominal tenderness. There is no guarding.      Comments: Biliary drain with bilious output  Dressings soaked with bile   Musculoskeletal:         General: Normal range of motion.      Cervical back: Normal range of motion and neck supple.   Skin:     General: Skin is warm.      Coloration: Skin is jaundiced.   Neurological:      Mental Status: He is alert and oriented to person, place, and time. Mental status is at baseline.      Motor: Weakness (unchanged) present.   Psychiatric:         Mood and Affect: Mood normal.         Behavior: Behavior normal.         Thought Content: Thought content normal.         Judgment: Judgment normal.       Fluids    Intake/Output Summary (Last 24 hours) at 2/28/2023 1407  Last data filed at 2/28/2023 0900  Gross per 24 hour   Intake 480 ml   Output 915 ml   Net -435 ml       Laboratory  Recent Labs     02/27/23  0340 02/27/23  1855 02/28/23  0626   WBC 6.6 6.9 8.6   RBC 2.80* 2.83* 2.84*   HEMOGLOBIN 8.3* 8.5* 8.5*   HEMATOCRIT 25.0* 25.9* 25.9*   MCV 89.3 91.5 91.2   MCH 29.6 30.0 29.9   MCHC 33.2* 32.8* 32.8*   RDW 53.9* 54.5* 55.8*   PLATELETCT 215 255 271   MPV 9.5 9.4 9.4     Recent Labs     02/27/23  0230 02/27/23  0340 02/28/23  0626   SODIUM 137 139 137   POTASSIUM 3.5* 3.5* 3.8   CHLORIDE 108 109 108   CO2 20 21 21   GLUCOSE 111* 105* 93   BUN 13 12 10   CREATININE 0.64 0.62 0.76   CALCIUM 7.8* 7.8* 8.1*                   Imaging  CT-DRAIN-CHOLECTYSTOSTOMY   Final Result      1. CT guided cholecystostomy with placement of an 8-Upper sorbian locking loop catheter.      2. Before catheter removal, a cholecystogram with  cholangiogram would be recommended to assure patency of the common duct. Likewise, a tracto-gram may also be helpful to make sure that the catheter tract has matured so that there is not intraperitoneal    bile leakage after catheter removal.      CT-ABDOMEN-PELVIS WITH   Final Result   Addendum (preliminary) 1 of 1   Findings were discussed with Dr. Stern on 2/22/2023 6:28 AM.      Final      1.  Distended gallbladder with air in the gallbladder wall and pericholecystic fluid/fat stranding, highly suggestive of acute emphysematous cholecystitis.   2.  Pneumobilia, moderate biliary dilatation and CBD wall enhancement. A component of cholangitis is likely.   3.  A 3.2 cm pancreatic mass obstructing the CBD and pancreatic ducts and resulting in pancreatic atrophy.   4.  The mass abuts and narrows the portal vein. It also abuts the SMV and hepatic artery.   5.  Multiple hypoattenuation hepatic lesions. Some of them likely represents pancreatic cancer metastases, but some may represent small abscesses. They measure up to 1.4 cm. Consider evaluation with contrast-enhanced abdominal MRI.   6.  Metallic stent, located in the lumen of the sigmoid colon.   7.  Punctate nonobstructing left nephrolithiasis.   8.  Small volume ascites.      Attempts to contact patient's provider regarding potentially critical findings were initiated on 2/22/2023 0558 AM.      CT-HEAD W/O   Final Result      1. No CT evidence of acute infarct, hemorrhage or mass.   2. Mild global parenchymal atrophy. Chronic small vessel ischemic changes.      DX-CHEST-PORTABLE (1 VIEW)   Final Result      Mild bibasilar atelectasis.      IR-DRAIN-CHOLECTYSTOSTOMY    (Results Pending)          Assessment/Plan  * Septic shock, hypoxia, cholecystitis s/p cholecystostomy tube/bacteremia, GI bleed/hemorrhagic shock (HCC)- (present on admission)  Assessment & Plan  Combined septic/hemorragic shock - now resolved    ID following  E. Cloaca bacteremia and gallbladder  infection, E. Faecalis and S. angionosus gallbladder infection  Continue IV zosyn  Dr. Schwarz was updated, Surgery who plans cholecystectomy outpatient and in a month or so  IR to upsize and replace drain  Continue supportive care    Pancreatic cancer (HCC)- (present on admission)  Assessment & Plan  Being treated at [facility] by Dr Villegas  Spoke with her 2/24 and updated her.  She agrees with current management  Pt has scheduled follow up in early March    Hemorrhagic shock (HCC)  Assessment & Plan  Now s/p resuscitation and has resolved  Cont to monitor    Emphysematous cholecystitis  Assessment & Plan  Now s/p cholecystostomy tube 2/23  Cultures showing enterobacter cloacae from blood: follow to final ID and sensitivities  Currently on Pip/Tazo    Hypokalemia- (present on admission)  Assessment & Plan  Replaced   resolved    Goals of care, counseling/discussion- (present on admission)  Assessment & Plan  DW pt in person and his wife by phone  He had been going to comfort care as they both thought he was unlikely to survive however today he is feeling much better.  They now wish to try to get him through his acute illness until he can see Dr Villegas ([facility]) in March to consider continuing chemo.  Based on discussion pt will remain DNR/I however cont to treat medical issues agressively    Pancreatic cancer metastasized to liver (HCC)- (present on admission)  Assessment & Plan  Discussed goals of care  When infection improves he wants a trial with Dr. Villegas    Elevated alkaline phosphatase level- (present on admission)  Assessment & Plan  Likely malignant etiology  Cholecystostomy tube in place    Transaminitis- (present on admission)  Assessment & Plan  Improved now s/p cholecystostomy tube placement    Acute respiratory failure with hypoxia (HCC)- (present on admission)  Assessment & Plan  Resolved  Stable on room air  Cont O2/RT protocols  Mobilize      Acute encephalopathy- (present on admission)  Assessment &  "Plan  Acute metabolic/infectious  Continue supportive care  Cont to address infectious and metabolic issues  Minimize sedating medications  Mobilize    Seems to be at his baseline mentation    Acute upper GI bleeding- (present on admission)  Assessment & Plan  Clinically appears to have stopped bleeding - h/h remains stable  Cont IV PPI  Continue to follow  Discussed with GI: pt is very high risk for intervention.  They feel medical management is the best approach at this time      Acute blood loss anemia- (present on admission)  Assessment & Plan  Clinically looks like an UGIB  Clinically appears to have stopped bleeding  Cont IV PPI  Cont serial H&H  DW GI: pt is very high risk for intervention.  They feel medical management is the best approach at this time\"    Recent Labs     02/26/23  0406 02/26/23  1815 02/27/23  0340   HEMOGLOBIN 8.7* 8.3* 8.3*   HEMATOCRIT 26.5* 25.0* 25.0*   MCV 90.1 89.0 89.3   MCH 29.6 29.5 29.6   PLATELETCT 178 208 215     Stable  No active bleeding  GI f/u at some point.         VTE prophylaxis: SCDs/TEDs    I have performed a physical exam and reviewed and updated ROS and Plan today (2/28/2023). In review of yesterday's note (2/27/2023), there are no changes except as documented above.        " no

## 2023-02-28 NOTE — PROGRESS NOTES
Received bedside report from night shift RN. Patient is A&Ox4. Patient is on RA, VSS. Patient has a cholecystostomy, which is leaking around drain. Dr Juarez is aware. Patient is suppose to go to IR for a larger bore placed. Out put is 135 ml measured on my shift. Patient emptied his drain one time this shift and did not measure output. Patient denies any pain at this time. POC discussed with patient, all questions answered. No further needs at this time. Bed is in lowest/locked position. Call light and belongings are within reach. Hourly rounding in place.

## 2023-03-01 ENCOUNTER — PATIENT OUTREACH (OUTPATIENT)
Dept: SCHEDULING | Facility: IMAGING CENTER | Age: 73
End: 2023-03-01
Payer: COMMERCIAL

## 2023-03-01 VITALS
WEIGHT: 165.57 LBS | SYSTOLIC BLOOD PRESSURE: 126 MMHG | HEIGHT: 74 IN | DIASTOLIC BLOOD PRESSURE: 72 MMHG | TEMPERATURE: 98 F | OXYGEN SATURATION: 98 % | HEART RATE: 70 BPM | RESPIRATION RATE: 17 BRPM | BODY MASS INDEX: 21.25 KG/M2

## 2023-03-01 PROBLEM — D62 ACUTE BLOOD LOSS ANEMIA: Status: RESOLVED | Noted: 2023-02-21 | Resolved: 2023-03-01

## 2023-03-01 PROBLEM — E87.6 HYPOKALEMIA: Status: RESOLVED | Noted: 2023-02-21 | Resolved: 2023-03-01

## 2023-03-01 PROBLEM — A41.9 SEPTIC SHOCK (HCC): Status: RESOLVED | Noted: 2023-02-21 | Resolved: 2023-03-01

## 2023-03-01 PROBLEM — G93.40 ACUTE ENCEPHALOPATHY: Status: RESOLVED | Noted: 2023-02-21 | Resolved: 2023-03-01

## 2023-03-01 PROBLEM — R57.8 HEMORRHAGIC SHOCK (HCC): Status: RESOLVED | Noted: 2023-02-22 | Resolved: 2023-03-01

## 2023-03-01 PROBLEM — R65.21 SEPTIC SHOCK (HCC): Status: RESOLVED | Noted: 2023-02-21 | Resolved: 2023-03-01

## 2023-03-01 PROBLEM — K92.2 ACUTE UPPER GI BLEEDING: Status: RESOLVED | Noted: 2023-02-21 | Resolved: 2023-03-01

## 2023-03-01 PROBLEM — J96.01 ACUTE RESPIRATORY FAILURE WITH HYPOXIA (HCC): Status: RESOLVED | Noted: 2023-02-21 | Resolved: 2023-03-01

## 2023-03-01 LAB
ALBUMIN SERPL BCP-MCNC: 2.6 G/DL (ref 3.2–4.9)
ALBUMIN/GLOB SERPL: 1 G/DL
ALP SERPL-CCNC: 271 U/L (ref 30–99)
ALT SERPL-CCNC: 22 U/L (ref 2–50)
ANION GAP SERPL CALC-SCNC: 10 MMOL/L (ref 7–16)
AST SERPL-CCNC: 16 U/L (ref 12–45)
BASOPHILS # BLD AUTO: 0.4 % (ref 0–1.8)
BASOPHILS # BLD: 0.03 K/UL (ref 0–0.12)
BILIRUB SERPL-MCNC: 2 MG/DL (ref 0.1–1.5)
BUN SERPL-MCNC: 11 MG/DL (ref 8–22)
CALCIUM ALBUM COR SERPL-MCNC: 9 MG/DL (ref 8.5–10.5)
CALCIUM SERPL-MCNC: 7.9 MG/DL (ref 8.5–10.5)
CHLORIDE SERPL-SCNC: 110 MMOL/L (ref 96–112)
CO2 SERPL-SCNC: 19 MMOL/L (ref 20–33)
CREAT SERPL-MCNC: 0.66 MG/DL (ref 0.5–1.4)
EOSINOPHIL # BLD AUTO: 0.15 K/UL (ref 0–0.51)
EOSINOPHIL NFR BLD: 2.2 % (ref 0–6.9)
ERYTHROCYTE [DISTWIDTH] IN BLOOD BY AUTOMATED COUNT: 57.3 FL (ref 35.9–50)
GFR SERPLBLD CREATININE-BSD FMLA CKD-EPI: 100 ML/MIN/1.73 M 2
GLOBULIN SER CALC-MCNC: 2.6 G/DL (ref 1.9–3.5)
GLUCOSE SERPL-MCNC: 119 MG/DL (ref 65–99)
HCT VFR BLD AUTO: 25.7 % (ref 42–52)
HGB BLD-MCNC: 8.4 G/DL (ref 14–18)
IMM GRANULOCYTES # BLD AUTO: 0.1 K/UL (ref 0–0.11)
IMM GRANULOCYTES NFR BLD AUTO: 1.4 % (ref 0–0.9)
LYMPHOCYTES # BLD AUTO: 0.84 K/UL (ref 1–4.8)
LYMPHOCYTES NFR BLD: 12.2 % (ref 22–41)
MAGNESIUM SERPL-MCNC: 2.1 MG/DL (ref 1.5–2.5)
MCH RBC QN AUTO: 30.2 PG (ref 27–33)
MCHC RBC AUTO-ENTMCNC: 32.7 G/DL (ref 33.7–35.3)
MCV RBC AUTO: 92.4 FL (ref 81.4–97.8)
MONOCYTES # BLD AUTO: 0.61 K/UL (ref 0–0.85)
MONOCYTES NFR BLD AUTO: 8.8 % (ref 0–13.4)
NEUTROPHILS # BLD AUTO: 5.18 K/UL (ref 1.82–7.42)
NEUTROPHILS NFR BLD: 75 % (ref 44–72)
NRBC # BLD AUTO: 0 K/UL
NRBC BLD-RTO: 0 /100 WBC
PHOSPHATE SERPL-MCNC: 2.8 MG/DL (ref 2.5–4.5)
PLATELET # BLD AUTO: 311 K/UL (ref 164–446)
PMV BLD AUTO: 9.3 FL (ref 9–12.9)
POTASSIUM SERPL-SCNC: 3.5 MMOL/L (ref 3.6–5.5)
PROT SERPL-MCNC: 5.2 G/DL (ref 6–8.2)
RBC # BLD AUTO: 2.78 M/UL (ref 4.7–6.1)
SODIUM SERPL-SCNC: 139 MMOL/L (ref 135–145)
WBC # BLD AUTO: 6.9 K/UL (ref 4.8–10.8)

## 2023-03-01 PROCEDURE — 80053 COMPREHEN METABOLIC PANEL: CPT

## 2023-03-01 PROCEDURE — 99239 HOSP IP/OBS DSCHRG MGMT >30: CPT | Performed by: HOSPITALIST

## 2023-03-01 PROCEDURE — 84100 ASSAY OF PHOSPHORUS: CPT

## 2023-03-01 PROCEDURE — 85025 COMPLETE CBC W/AUTO DIFF WBC: CPT

## 2023-03-01 PROCEDURE — 700102 HCHG RX REV CODE 250 W/ 637 OVERRIDE(OP): Performed by: INTERNAL MEDICINE

## 2023-03-01 PROCEDURE — 700111 HCHG RX REV CODE 636 W/ 250 OVERRIDE (IP): Performed by: HOSPITALIST

## 2023-03-01 PROCEDURE — A9270 NON-COVERED ITEM OR SERVICE: HCPCS | Performed by: INTERNAL MEDICINE

## 2023-03-01 PROCEDURE — 700111 HCHG RX REV CODE 636 W/ 250 OVERRIDE (IP): Performed by: INTERNAL MEDICINE

## 2023-03-01 PROCEDURE — 83735 ASSAY OF MAGNESIUM: CPT

## 2023-03-01 PROCEDURE — 700105 HCHG RX REV CODE 258: Performed by: INTERNAL MEDICINE

## 2023-03-01 RX ORDER — OMEPRAZOLE 20 MG/1
20 CAPSULE, DELAYED RELEASE ORAL 2 TIMES DAILY
Qty: 30 CAPSULE | Refills: 0 | Status: SHIPPED | OUTPATIENT
Start: 2023-03-01

## 2023-03-01 RX ORDER — AMOXICILLIN AND CLAVULANATE POTASSIUM 875; 125 MG/1; MG/1
1 TABLET, FILM COATED ORAL 2 TIMES DAILY
Qty: 84 TABLET | Refills: 1 | Status: ACTIVE | OUTPATIENT
Start: 2023-03-01 | End: 2023-03-01 | Stop reason: SDUPTHER

## 2023-03-01 RX ORDER — SULFAMETHOXAZOLE AND TRIMETHOPRIM 800; 160 MG/1; MG/1
1 TABLET ORAL 2 TIMES DAILY
Qty: 28 TABLET | Refills: 1 | Status: ACTIVE | OUTPATIENT
Start: 2023-03-01 | End: 2023-03-01 | Stop reason: SDUPTHER

## 2023-03-01 RX ORDER — SULFAMETHOXAZOLE AND TRIMETHOPRIM 800; 160 MG/1; MG/1
1 TABLET ORAL 2 TIMES DAILY
Qty: 84 TABLET | Refills: 1 | Status: ACTIVE | OUTPATIENT
Start: 2023-03-01 | End: 2023-03-01 | Stop reason: SDUPTHER

## 2023-03-01 RX ORDER — AMOXICILLIN AND CLAVULANATE POTASSIUM 875; 125 MG/1; MG/1
1 TABLET, FILM COATED ORAL 2 TIMES DAILY
Qty: 28 TABLET | Refills: 1 | Status: ACTIVE | OUTPATIENT
Start: 2023-03-01 | End: 2023-03-01 | Stop reason: SDUPTHER

## 2023-03-01 RX ORDER — SULFAMETHOXAZOLE AND TRIMETHOPRIM 800; 160 MG/1; MG/1
1 TABLET ORAL 2 TIMES DAILY
Qty: 84 TABLET | Refills: 1 | Status: ACTIVE | OUTPATIENT
Start: 2023-03-01 | End: 2023-04-12

## 2023-03-01 RX ORDER — HEPARIN SODIUM (PORCINE) LOCK FLUSH IV SOLN 100 UNIT/ML 100 UNIT/ML
300-500 SOLUTION INTRAVENOUS
Status: DISCONTINUED | OUTPATIENT
Start: 2023-03-01 | End: 2023-03-01 | Stop reason: HOSPADM

## 2023-03-01 RX ORDER — AMOXICILLIN AND CLAVULANATE POTASSIUM 875; 125 MG/1; MG/1
1 TABLET, FILM COATED ORAL 2 TIMES DAILY
Qty: 84 TABLET | Refills: 1 | Status: ACTIVE | OUTPATIENT
Start: 2023-03-01 | End: 2023-04-12

## 2023-03-01 RX ORDER — SODIUM CHLORIDE 9 MG/ML
3 INJECTION, SOLUTION INTRAMUSCULAR; INTRAVENOUS; SUBCUTANEOUS ONCE
Status: DISCONTINUED | OUTPATIENT
Start: 2023-03-01 | End: 2023-03-04

## 2023-03-01 RX ORDER — SODIUM CHLORIDE 9 MG/ML
10 INJECTION, SOLUTION INTRAMUSCULAR; INTRAVENOUS; SUBCUTANEOUS
Status: DISCONTINUED | OUTPATIENT
Start: 2023-03-01 | End: 2023-03-01

## 2023-03-01 RX ORDER — SODIUM CHLORIDE 9 MG/ML
10 INJECTION, SOLUTION INTRAMUSCULAR; INTRAVENOUS; SUBCUTANEOUS
Status: SHIPPED | OUTPATIENT
Start: 2023-03-01 | End: 2023-04-12

## 2023-03-01 RX ADMIN — PIPERACILLIN AND TAZOBACTAM 3.38 G: 3; .375 INJECTION, POWDER, LYOPHILIZED, FOR SOLUTION INTRAVENOUS; PARENTERAL at 00:34

## 2023-03-01 RX ADMIN — PIPERACILLIN AND TAZOBACTAM 3.38 G: 3; .375 INJECTION, POWDER, LYOPHILIZED, FOR SOLUTION INTRAVENOUS; PARENTERAL at 09:17

## 2023-03-01 RX ADMIN — OMEPRAZOLE 20 MG: 20 CAPSULE, DELAYED RELEASE ORAL at 05:12

## 2023-03-01 RX ADMIN — HEPARIN 500 UNITS: 100 SYRINGE at 13:10

## 2023-03-01 NOTE — FACE TO FACE
Face to Face Note  -  Durable Medical Equipment    Cassandra Cohn M.D. - NPI: [id number]  I certify that this patient is under my care and that they had a durable medical equipment(DME)face to face encounter by myself that meets the physician DME face-to-face encounter requirements with this patient on:    Date of encounter:   Patient:                    MRN:                       YOB: 2022  Carl Finney  [id number]  1950     The encounter with the patient was in whole, or in part, for the following medical condition, which is the primary reason for durable medical equipment:  Post-Op Surgery    I certify that, based on my findings, the following durable medical equipment is medically necessary:    Other DME Equipment - tub/ shower bench .    My Clinical findings support the need for the above equipment due to:  Other - weakness, PT and OT recommendation

## 2023-03-01 NOTE — PROGRESS NOTES
Received bedside report from night shift RN. Patient is A&Ox4. Patient is on RA, VSS. Patient denies any pain at this time. Patient went to IR today to have a larger bore placed in his cholecystostomy. POC discussed with patient, all questions answered.  Patient was walking laps this afternoon. No further needs at this time. Bed is in lowest/locked position. Call light and belongings are within reach. Hourly rounding in place.

## 2023-03-01 NOTE — DISCHARGE PLANNING
Care Transition Team Assessment    Information Source: Patient admitted for septic shock with HX of pancreatic cancer. Live with spouse.   D/C order written and will return home with drain.  Orientation Level: Oriented X4  Information Given By: Patient  Informant's Name: Carl Finney  Who is responsible for making decisions for patient? : Patient    Readmission Evaluation  Is this a readmission?: No    Elopement Risk  Legal Hold: No  Ambulatory or Self Mobile in Wheelchair: No-Not an Elopement Risk  Disoriented: No  Psychiatric Symptoms: None  History of Wandering: No  Elopement this Admit: No  Vocalizing Wanting to Leave: No  Displays Behaviors, Body Language Wanting to Leave: No-Not at Risk for Elopement  Elopement Risk: Not at Risk for Elopement    Interdisciplinary Discharge Planning  Does Admitting Nurse Feel This Could be a Complex Discharge?: No  Primary Care Physician: oncologyst in [place]  Lives with - Patient's Self Care Capacity: Spouse  Patient or legal guardian wants to designate a caregiver: No  Caregiver name: Lori Finney  Caregiver contact info: [phone]  Support Systems: Spouse / Significant Other  Housing / Facility: [address]  Do You Take your Prescribed Medications Regularly: Yes  Able to Return to Previous ADL's: Yes  Mobility Issues: No  Prior Services: None  Patient Prefers to be Discharged to::  (home)  Assistance Needed: No  Durable Medical Equipment: Not Applicable    Discharge Preparedness  What is your plan after discharge?: Home health care  What are your discharge supports?: Spouse  Prior Functional Level: Ambulatory  Difficulity with ADLs: None  Difficulity with IADLs: None    Functional Assesment  Prior Functional Level: Ambulatory    Finances  Financial Barriers to Discharge: No  Prescription Coverage: Yes    Vision / Hearing Impairment  Vision Impairment : No  Right Eye Vision: Impaired, Wears Glasses  Left Eye Vision: Impaired, Wears Glasses  Hearing Impairment : No          Advance Directive  Advance Directive?: None    Domestic Abuse  Have you ever been the victim of abuse or violence?: No  Physical Abuse or Sexual Abuse: No  Verbal Abuse or Emotional Abuse: No  Possible Abuse/Neglect Reported to:: Not Applicable    Psychological Assessment  History of Substance Abuse: None  History of Psychiatric Problems: No  Non-compliant with Treatment: No  Newly Diagnosed Illness: No    Discharge Risks or Barriers  Discharge risks or barriers?: No    Anticipated Discharge Information  Discharge Disposition: Discharged to home with HHC and DME

## 2023-03-01 NOTE — DISCHARGE SUMMARY
Discharge Summary    CHIEF COMPLAINT ON ADMISSION  Chief Complaint   Patient presents with    ALOC       Reason for Admission  Fire/EMS     Admission Date  2/21/2023    CODE STATUS  DNAR/DNI    HPI & HOSPITAL COURSE  Patient is a 72 year old male with history of hypertension, dyslipidemia, prostate cancer s/p prostatectomy, gout, recently diagnosed pancreatic cancer with mets to the liver in November of last year, s/p ERCP and biliary stent placement.  He was started on chemotherapy with gemcitabine and Abraxane by Dr. Villegas at [facility].  Patient presented to [facility] on 2/21/23 with complaints of abdominal pain, nausea and vomiting  His emesis had been bloody.  In the ED was hypotensive and tachycardic with signs of hypoperfusion and diagnosed with shock.  His hemoglobin was found to be profoundly low at 3.4.  Patient was transfused in the ED and admitted to the ICU with diagnosis of mixed shock from sepsis and GI bleed.  General surgery Dr. Oliver and gastroenterology Dr. Mike were consulted.     In the ICU CT was done which demonstrated evidence of cholecystitis.  He was started empirically on meropenem, and IR placed a cholecystostomy tube on February 23rd.  Blood cultures done on February 21 came back 1 of 2 positive for Enterobacter cloacae.  Subsequent cultures from blood and from the cholecystostomy tube and blood have been negative. IR placed a larger drain on 2/28/23 and he tolerated this well. IR has recommended that the drain remain in place for 6 weeks and that it be flushed with 10cc of sterile saline daily. ID has recommended that he be on oral augmentin and bactrium as long as the drain is in. He agrees to follow up at [facility] and to follow up intermittent labs with his pcp. He has an appointment with Dr. Villegas on 3/4/23.    He will be discharged with home health and he agrees to return to the ER if needed.      Patient's CODE STATUS is DNR/I     Previous provider contacted Dr Villegas [phone]:  patient has follow up appointment with her in March. She would like to see him in person before deciding on continued chemo    Therefore, he is discharged in fair and stable condition to home with organized home healthcare and close outpatient follow-up.    The patient met 2-midnight criteria for an inpatient stay at the time of discharge.    Discharge Date  3/1/23    FOLLOW UP ITEMS POST DISCHARGE  Pcp  GI  Oncology   Surgery    DISCHARGE DIAGNOSES  Principal Problem (Resolved):    Septic shock, hypoxia, cholecystitis s/p cholecystostomy tube/bacteremia, GI bleed/hemorrhagic shock (HCC) POA: Yes  Active Problems:    Transaminitis POA: Yes    Elevated alkaline phosphatase level POA: Yes    Pancreatic cancer metastasized to liver (HCC) POA: Yes    Goals of care, counseling/discussion POA: Yes    Emphysematous cholecystitis POA: Unknown    Pancreatic cancer (HCC) POA: Yes  Resolved Problems:    Acute blood loss anemia POA: Yes    Acute upper GI bleeding POA: Yes    Acute encephalopathy POA: Yes    Acute respiratory failure with hypoxia (HCC) POA: Yes    Hypokalemia POA: Yes    Hemorrhagic shock (HCC) POA: Unknown      FOLLOW UP  No future appointments.  Brendan Hargrove M.D.  [address] [facility] [address]  [phone]    Follow up        MEDICATIONS ON DISCHARGE     Medication List        START taking these medications        Instructions   amoxicillin-clavulanate 875-125 MG Tabs  Commonly known as: AUGMENTIN   Doctor's comments: Continue until drain removed as outpatient per GI/surgery  Take 1 Tablet by mouth 2 times a day for 42 days.  Dose: 1 Tablet     omeprazole 20 MG delayed-release capsule  Commonly known as: PRILOSEC   Take 1 Capsule by mouth 2 times a day.  Dose: 20 mg     sulfamethoxazole-trimethoprim 800-160 MG tablet  Commonly known as: BACTRIM DS   Doctor's comments: Continue until drain removed as outpatient per GI/ surgery  Take 1 Tablet by mouth 2 times a day for 42 days.  Dose: 1  Tablet            CONTINUE taking these medications        Instructions   calcium carbonate 500 MG Chew  Commonly known as: Tums   Chew 500 mg as needed.  Dose: 500 mg     ondansetron 8 MG Tabs  Commonly known as: ZOFRAN   Take 8 mg by mouth every 8 hours as needed.  Dose: 8 mg     oxyCODONE immediate release 10 MG immediate release tablet  Commonly known as: ROXICODONE   Take 10 mg by mouth every four hours as needed.  Dose: 10 mg            STOP taking these medications      NON SPECIFIED              Allergies  Allergies   Allergen Reactions    Ceftriaxone Unspecified     Unable to speak and high fever  RXN=7/6/17       DIET  Orders Placed This Encounter   Procedures    Diet Order Diet: Regular     Standing Status:   Standing     Number of Occurrences:   1     Order Specific Question:   Diet:     Answer:   Regular [1]       ACTIVITY  As tolerated.  Weight bearing as tolerated    CONSULTATIONS  Surgery [place]  GI [place]  Critical Care  IR [place]    PROCEDURES  IR-DRAIN-CHOLECTYSTOSTOMY   Final Result      1.  Cholecystogram demonstrates proper position within the gallbladder and redemonstration of a common bile duct obstruction.   2.  Technically successful fluoroscopy guided upsize of existing cholecystostomy drain. New 10 French drain placed.   3.  Drain should remain in place for 6 weeks from initial placement to allow for tract maturity.         CT-DRAIN-CHOLECTYSTOSTOMY   Final Result      1. CT guided cholecystostomy with placement of an 8-French locking loop catheter.      2. Before catheter removal, a cholecystogram with cholangiogram would be recommended to assure patency of the common duct. Likewise, a tracto-gram may also be helpful to make sure that the catheter tract has matured so that there is not intraperitoneal    bile leakage after catheter removal.      CT-ABDOMEN-PELVIS WITH   Final Result   Addendum (preliminary) 1 of 1   Findings were discussed with Dr. Stern on 2/22/2023 6:28 AM.      Final       1.  Distended gallbladder with air in the gallbladder wall and pericholecystic fluid/fat stranding, highly suggestive of acute emphysematous cholecystitis.   2.  Pneumobilia, moderate biliary dilatation and CBD wall enhancement. A component of cholangitis is likely.   3.  A 3.2 cm pancreatic mass obstructing the CBD and pancreatic ducts and resulting in pancreatic atrophy.   4.  The mass abuts and narrows the portal vein. It also abuts the SMV and hepatic artery.   5.  Multiple hypoattenuation hepatic lesions. Some of them likely represents pancreatic cancer metastases, but some may represent small abscesses. They measure up to 1.4 cm. Consider evaluation with contrast-enhanced abdominal MRI.   6.  Metallic stent, located in the lumen of the sigmoid colon.   7.  Punctate nonobstructing left nephrolithiasis.   8.  Small volume ascites.      Attempts to contact patient's provider regarding potentially critical findings were initiated on 2/22/2023 0558 AM.      CT-HEAD W/O   Final Result      1. No CT evidence of acute infarct, hemorrhage or mass.   2. Mild global parenchymal atrophy. Chronic small vessel ischemic changes.      DX-CHEST-PORTABLE (1 VIEW)   Final Result      Mild bibasilar atelectasis.            LABORATORY  Lab Results   Component Value Date    SODIUM 139 03/01/2023    POTASSIUM 3.5 (L) 03/01/2023    CHLORIDE 110 03/01/2023    CO2 19 (L) 03/01/2023    GLUCOSE 119 (H) 03/01/2023    BUN 11 03/01/2023    CREATININE 0.66 03/01/2023        Lab Results   Component Value Date    WBC 6.9 03/01/2023    HEMOGLOBIN 8.4 (L) 03/01/2023    HEMATOCRIT 25.7 (L) 03/01/2023    PLATELETCT 311 03/01/2023        Total time of the discharge process exceeds 45 minutes.

## 2023-03-01 NOTE — THERAPY
Physical Therapy   Discharge     Patient Name: Carl Finney  Age:  72 y.o., Sex:  male  Medical Record #: [id number]  Today's Date: 2/28/2023     Precautions  Comments: jhonatan drain to right    Assessment  Pt was greeted up ambulating in hallway w/o AD or SPV, therapist ambulated 200ft w/ pt, pt reported walking multiple laps. Pt is able to perform all other mobility w/ SPV no AD. Discussed therex and HEP. Pt to be discharged 2/2 goals mets.     Plan    Reason for Discharge From Therapy: Discharge Secondary to Goals Met    DC Equipment Recommendations: None  Discharge Recommendations: Anticipate that the patient will have no further physical therapy needs after discharge from the hospital       02/28/23 1101   Cognition    Comments agreeable   Sitting Lower Body Exercises   Comments discussed therex, provided handout and theraband. Pt declined demo'ing any therex, verbalized knowledge of therex. Therapist demo'd use of theraband   Balance   Sitting Balance (Static) Good   Sitting Balance (Dynamic) Good   Standing Balance (Static) Good   Standing Balance (Dynamic) Good   Weight Shift Sitting Good   Weight Shift Standing Good   Bed Mobility    Supine to Sit Independent   Sit to Supine Independent   Scooting Independent   Gait Analysis   Gait Level Of Assist Independent   Assistive Device None   Distance (Feet) 200   # of Times Distance was Traveled 1   Comments pt rec'd up walking laps in hallway w/o AD or SPV   Functional Mobility   Sit to Stand Independent   Bed, Chair, Wheelchair Transfer Independent   Transfer Method Stand Step   Short Term Goals    Short Term Goal # 1 Pt will perform supine<>sit from flat HOB/no railing with supervision within 6 visits to ensure independent mobility at home.   Goal Outcome # 1 Goal met   Short Term Goal # 2 Pt will perform sit<>stand with FWW and supervision within 6 visits to ensure independent mobility at home.   Goal Outcome # 2 Goal met   Short Term Goal # 3 Pt will  ambulate x150ft wiht FWW and supervision within 6 visits to ensure independent mobility at home.   Goal Outcome # 3 Goal met   Short Term Goal # 4 Pt will ascend/descend 1 step with FWW and min A wtihin 6 visits to ensure household ambulation.   Goal Outcome # 4 Goal met

## 2023-03-01 NOTE — FACE TO FACE
Face to Face Supporting Documentation - Home Health    The encounter with this patient was in whole or in part the primary reason for home health admission.    Date of encounter:   Patient:                    MRN:                       YOB: 2022  Carl Finney  [id number]  1950     Home health to see patient for:  Wound Care    Skilled need for:  Surgical Aftercare bilary drain    Skilled nursing interventions to include:  Wound Care    Homebound status evidenced by:  Needs the assistance of another person in order to leave the home. Leaving home requires a considerable and taxing effort. There is a normal inability to leave the home.    Community Physician to provide follow up care: Brendan Hargrove M.D.     Optional Interventions? No      I certify the face to face encounter for this home health care referral meets the CMS requirements and the encounter/clinical assessment with the patient was, in whole, or in part, for the medical condition(s) listed above, which is the primary reason for home health care. Based on my clinical findings: the service(s) are medically necessary, support the need for home health care, and the homebound criteria are met.  I certify that this patient has had a face to face encounter by myself.  Cassandra Cohn M.D. - NPI: [id number]

## 2023-03-01 NOTE — PROGRESS NOTES
Reviewed discharge instructions with patient and wife at bedside. Gave step by step instructions for drain care and supplies. Patient discharging home with spouse via private vehicle.

## 2023-03-01 NOTE — DISCHARGE PLANNING
Received Choice form at 1125  Agency/Facility Name: [facility]  Referral sent per Choice form @ 1300     Received Choice form at 1126  Agency/Facility Name: [facility]  Referral sent per Choice form @ 0238 - JOD put a comment requesting the tub/transfer bench be delivered to the pt's room.  Pt lives in [place].    @1400  Agency/Facility Name: [facility]  Spoke To: Laura  Outcome: Referral has been received and will be processed.  [facility] will deliver the tub transfer bench to the room today.    PRISCILLA Michele notified via voalte    @1420  Agency/Facility Name: [facility]  Spoke To: Laura  Outcome: Tub transfer bench is not covered by Medicare.  [facility].

## 2023-03-01 NOTE — PROGRESS NOTES
Pt. Received in bed awake, orientedx4. Intact new drain on RLQ of abdomen with no leak noted, draining bile. Intact PIV and R chest port, draining blood. Pt. Ambulates around the unit. Uses urinal at bedside. Call light placed within reach. Needs attended.

## 2023-03-01 NOTE — CARE PLAN
The patient is Stable - Low risk of patient condition declining or worsening    Shift Goals  Clinical Goals: no leak on drain site, monitor output      Progress made toward(s) clinical / shift goals:  intact site, output stable      Problem: Pain - Standard  Goal: Alleviation of pain or a reduction in pain to the patient’s comfort goal  Outcome: Progressing     Problem: Knowledge Deficit - Standard  Goal: Patient and family/care givers will demonstrate understanding of plan of care, disease process/condition, diagnostic tests and medications  Outcome: Progressing     Problem: Skin Integrity  Goal: Skin integrity is maintained or improved  Outcome: Progressing     Problem: Psychosocial  Goal: Patient's ability to identify and develop effective coping behaviors will improve  Outcome: Progressing  Goal: Patient's ability to identify and utilize available support systems will improve  Outcome: Progressing     Problem: Fall Risk  Goal: Patient will remain free from falls  Outcome: Progressing

## 2023-03-01 NOTE — DISCHARGE PLANNING
Care Transition Team Final Discharge Disposition    Actual Discharge Information    D/C order written.  Face to face completed  Order written for dme(tube shower bench)  Order written for HHC.  Choice provided for DME and HHC.  DPA to make referrals  IMM issued  No other needs.  Discharge Disposition: Discharged to home with HHC, DME

## 2023-03-04 ENCOUNTER — HOSPITAL ENCOUNTER (INPATIENT)
Facility: MEDICAL CENTER | Age: 73
LOS: 2 days | DRG: 919 | End: 2023-03-06
Attending: STUDENT IN AN ORGANIZED HEALTH CARE EDUCATION/TRAINING PROGRAM | Admitting: STUDENT IN AN ORGANIZED HEALTH CARE EDUCATION/TRAINING PROGRAM
Payer: MEDICARE

## 2023-03-04 ENCOUNTER — APPOINTMENT (OUTPATIENT)
Dept: RADIOLOGY | Facility: MEDICAL CENTER | Age: 73
DRG: 919 | End: 2023-03-04
Attending: STUDENT IN AN ORGANIZED HEALTH CARE EDUCATION/TRAINING PROGRAM
Payer: MEDICARE

## 2023-03-04 DIAGNOSIS — D64.9 NORMOCYTIC ANEMIA: ICD-10-CM

## 2023-03-04 DIAGNOSIS — D64.9 SYMPTOMATIC ANEMIA: ICD-10-CM

## 2023-03-04 PROBLEM — D63.0 ANEMIA IN NEOPLASTIC DISEASE: Status: ACTIVE | Noted: 2023-03-04

## 2023-03-04 PROBLEM — T85.518A CHOLECYSTOSTOMY TUBE DYSFUNCTION: Status: ACTIVE | Noted: 2023-03-04

## 2023-03-04 PROBLEM — K21.9 GASTROESOPHAGEAL REFLUX DISEASE WITHOUT ESOPHAGITIS: Status: ACTIVE | Noted: 2023-03-04

## 2023-03-04 LAB
ALBUMIN SERPL BCP-MCNC: 2.6 G/DL (ref 3.2–4.9)
ALBUMIN/GLOB SERPL: 1 G/DL
ALP SERPL-CCNC: 276 U/L (ref 30–99)
ALT SERPL-CCNC: 18 U/L (ref 2–50)
ANION GAP SERPL CALC-SCNC: 10 MMOL/L (ref 7–16)
AST SERPL-CCNC: 17 U/L (ref 12–45)
BASOPHILS # BLD AUTO: 0.6 % (ref 0–1.8)
BASOPHILS # BLD: 0.04 K/UL (ref 0–0.12)
BILIRUB SERPL-MCNC: 1.6 MG/DL (ref 0.1–1.5)
BUN SERPL-MCNC: 11 MG/DL (ref 8–22)
CALCIUM ALBUM COR SERPL-MCNC: 9.4 MG/DL (ref 8.5–10.5)
CALCIUM SERPL-MCNC: 8.3 MG/DL (ref 8.5–10.5)
CHLORIDE SERPL-SCNC: 107 MMOL/L (ref 96–112)
CO2 SERPL-SCNC: 19 MMOL/L (ref 20–33)
CREAT SERPL-MCNC: 0.89 MG/DL (ref 0.5–1.4)
EOSINOPHIL # BLD AUTO: 0.03 K/UL (ref 0–0.51)
EOSINOPHIL NFR BLD: 0.4 % (ref 0–6.9)
ERYTHROCYTE [DISTWIDTH] IN BLOOD BY AUTOMATED COUNT: 55.5 FL (ref 35.9–50)
FERRITIN SERPL-MCNC: 440 NG/ML (ref 22–322)
GFR SERPLBLD CREATININE-BSD FMLA CKD-EPI: 91 ML/MIN/1.73 M 2
GLOBULIN SER CALC-MCNC: 2.6 G/DL (ref 1.9–3.5)
GLUCOSE SERPL-MCNC: 101 MG/DL (ref 65–99)
HCT VFR BLD AUTO: 23.7 % (ref 42–52)
HGB BLD-MCNC: 7.7 G/DL (ref 14–18)
HGB BLD-MCNC: 7.8 G/DL (ref 14–18)
HGB RETIC QN AUTO: 29.2 PG/CELL (ref 29–35)
IMM GRANULOCYTES # BLD AUTO: 0.06 K/UL (ref 0–0.11)
IMM GRANULOCYTES NFR BLD AUTO: 0.9 % (ref 0–0.9)
IMM RETICS NFR: 13 % (ref 9.3–17.4)
IRON SATN MFR SERPL: 10 % (ref 15–55)
IRON SERPL-MCNC: 18 UG/DL (ref 50–180)
LYMPHOCYTES # BLD AUTO: 0.87 K/UL (ref 1–4.8)
LYMPHOCYTES NFR BLD: 13 % (ref 22–41)
MCH RBC QN AUTO: 29.5 PG (ref 27–33)
MCHC RBC AUTO-ENTMCNC: 32.5 G/DL (ref 33.7–35.3)
MCV RBC AUTO: 90.8 FL (ref 81.4–97.8)
MONOCYTES # BLD AUTO: 0.53 K/UL (ref 0–0.85)
MONOCYTES NFR BLD AUTO: 7.9 % (ref 0–13.4)
NEUTROPHILS # BLD AUTO: 5.17 K/UL (ref 1.82–7.42)
NEUTROPHILS NFR BLD: 77.2 % (ref 44–72)
NRBC # BLD AUTO: 0 K/UL
NRBC BLD-RTO: 0 /100 WBC
PLATELET # BLD AUTO: 406 K/UL (ref 164–446)
PMV BLD AUTO: 9 FL (ref 9–12.9)
POTASSIUM SERPL-SCNC: 4.1 MMOL/L (ref 3.6–5.5)
PROT SERPL-MCNC: 5.2 G/DL (ref 6–8.2)
RBC # BLD AUTO: 2.61 M/UL (ref 4.7–6.1)
RETICS # AUTO: 0.04 M/UL (ref 0.04–0.06)
RETICS/RBC NFR: 1.6 % (ref 0.8–2.1)
SODIUM SERPL-SCNC: 136 MMOL/L (ref 135–145)
TIBC SERPL-MCNC: 184 UG/DL (ref 250–450)
UIBC SERPL-MCNC: 166 UG/DL (ref 110–370)
WBC # BLD AUTO: 6.7 K/UL (ref 4.8–10.8)

## 2023-03-04 PROCEDURE — 80053 COMPREHEN METABOLIC PANEL: CPT

## 2023-03-04 PROCEDURE — 306580 SET INFUSION,POWERLOC 20G X.75: Performed by: STUDENT IN AN ORGANIZED HEALTH CARE EDUCATION/TRAINING PROGRAM

## 2023-03-04 PROCEDURE — 74176 CT ABD & PELVIS W/O CONTRAST: CPT

## 2023-03-04 PROCEDURE — 770006 HCHG ROOM/CARE - MED/SURG/GYN SEMI*

## 2023-03-04 PROCEDURE — 36415 COLL VENOUS BLD VENIPUNCTURE: CPT

## 2023-03-04 PROCEDURE — 85018 HEMOGLOBIN: CPT

## 2023-03-04 PROCEDURE — 99223 1ST HOSP IP/OBS HIGH 75: CPT | Mod: AI | Performed by: STUDENT IN AN ORGANIZED HEALTH CARE EDUCATION/TRAINING PROGRAM

## 2023-03-04 PROCEDURE — 83540 ASSAY OF IRON: CPT

## 2023-03-04 PROCEDURE — 82728 ASSAY OF FERRITIN: CPT

## 2023-03-04 PROCEDURE — 83550 IRON BINDING TEST: CPT

## 2023-03-04 PROCEDURE — 85046 RETICYTE/HGB CONCENTRATE: CPT

## 2023-03-04 PROCEDURE — A9270 NON-COVERED ITEM OR SERVICE: HCPCS | Performed by: STUDENT IN AN ORGANIZED HEALTH CARE EDUCATION/TRAINING PROGRAM

## 2023-03-04 PROCEDURE — 85025 COMPLETE CBC W/AUTO DIFF WBC: CPT

## 2023-03-04 PROCEDURE — 700102 HCHG RX REV CODE 250 W/ 637 OVERRIDE(OP): Performed by: STUDENT IN AN ORGANIZED HEALTH CARE EDUCATION/TRAINING PROGRAM

## 2023-03-04 RX ORDER — POLYETHYLENE GLYCOL 3350 17 G/17G
1 POWDER, FOR SOLUTION ORAL
Status: DISCONTINUED | OUTPATIENT
Start: 2023-03-04 | End: 2023-03-06 | Stop reason: HOSPADM

## 2023-03-04 RX ORDER — TRAMADOL HYDROCHLORIDE 50 MG/1
50 TABLET ORAL EVERY 4 HOURS PRN
Status: DISCONTINUED | OUTPATIENT
Start: 2023-03-04 | End: 2023-03-06 | Stop reason: HOSPADM

## 2023-03-04 RX ORDER — LABETALOL HYDROCHLORIDE 5 MG/ML
10 INJECTION, SOLUTION INTRAVENOUS EVERY 4 HOURS PRN
Status: DISCONTINUED | OUTPATIENT
Start: 2023-03-04 | End: 2023-03-06 | Stop reason: HOSPADM

## 2023-03-04 RX ORDER — ACETAMINOPHEN 325 MG/1
650 TABLET ORAL EVERY 6 HOURS PRN
Status: DISCONTINUED | OUTPATIENT
Start: 2023-03-04 | End: 2023-03-06 | Stop reason: HOSPADM

## 2023-03-04 RX ORDER — ONDANSETRON 4 MG/1
4 TABLET, ORALLY DISINTEGRATING ORAL EVERY 4 HOURS PRN
Status: DISCONTINUED | OUTPATIENT
Start: 2023-03-04 | End: 2023-03-06 | Stop reason: HOSPADM

## 2023-03-04 RX ORDER — SULFAMETHOXAZOLE AND TRIMETHOPRIM 800; 160 MG/1; MG/1
1 TABLET ORAL 2 TIMES DAILY
Status: DISCONTINUED | OUTPATIENT
Start: 2023-03-04 | End: 2023-03-06 | Stop reason: HOSPADM

## 2023-03-04 RX ORDER — ONDANSETRON 2 MG/ML
4 INJECTION INTRAMUSCULAR; INTRAVENOUS EVERY 4 HOURS PRN
Status: DISCONTINUED | OUTPATIENT
Start: 2023-03-04 | End: 2023-03-06 | Stop reason: HOSPADM

## 2023-03-04 RX ORDER — OXYCODONE HYDROCHLORIDE 5 MG/1
5 TABLET ORAL EVERY 4 HOURS PRN
Status: DISCONTINUED | OUTPATIENT
Start: 2023-03-04 | End: 2023-03-06 | Stop reason: HOSPADM

## 2023-03-04 RX ORDER — OMEPRAZOLE 20 MG/1
20 CAPSULE, DELAYED RELEASE ORAL 2 TIMES DAILY
Status: DISCONTINUED | OUTPATIENT
Start: 2023-03-04 | End: 2023-03-06 | Stop reason: HOSPADM

## 2023-03-04 RX ORDER — AMOXICILLIN AND CLAVULANATE POTASSIUM 875; 125 MG/1; MG/1
1 TABLET, FILM COATED ORAL 2 TIMES DAILY
Status: DISCONTINUED | OUTPATIENT
Start: 2023-03-04 | End: 2023-03-06 | Stop reason: HOSPADM

## 2023-03-04 RX ORDER — AMOXICILLIN 250 MG
2 CAPSULE ORAL 2 TIMES DAILY
Status: DISCONTINUED | OUTPATIENT
Start: 2023-03-04 | End: 2023-03-06 | Stop reason: HOSPADM

## 2023-03-04 RX ORDER — BISACODYL 10 MG
10 SUPPOSITORY, RECTAL RECTAL
Status: DISCONTINUED | OUTPATIENT
Start: 2023-03-04 | End: 2023-03-06 | Stop reason: HOSPADM

## 2023-03-04 RX ADMIN — SULFAMETHOXAZOLE AND TRIMETHOPRIM 1 TABLET: 800; 160 TABLET ORAL at 06:26

## 2023-03-04 RX ADMIN — AMOXICILLIN AND CLAVULANATE POTASSIUM 1 TABLET: 875; 125 TABLET, FILM COATED ORAL at 17:06

## 2023-03-04 RX ADMIN — SULFAMETHOXAZOLE AND TRIMETHOPRIM 1 TABLET: 800; 160 TABLET ORAL at 17:06

## 2023-03-04 RX ADMIN — AMOXICILLIN AND CLAVULANATE POTASSIUM 1 TABLET: 875; 125 TABLET, FILM COATED ORAL at 06:26

## 2023-03-04 RX ADMIN — OMEPRAZOLE 20 MG: 20 CAPSULE, DELAYED RELEASE ORAL at 17:06

## 2023-03-04 RX ADMIN — OMEPRAZOLE 20 MG: 20 CAPSULE, DELAYED RELEASE ORAL at 06:26

## 2023-03-04 ASSESSMENT — COGNITIVE AND FUNCTIONAL STATUS - GENERAL
MOBILITY SCORE: 24
SUGGESTED CMS G CODE MODIFIER DAILY ACTIVITY: CH
DAILY ACTIVITIY SCORE: 24
SUGGESTED CMS G CODE MODIFIER MOBILITY: CH

## 2023-03-04 ASSESSMENT — FIBROSIS 4 INDEX: FIB4 SCORE: 0.78

## 2023-03-04 ASSESSMENT — PATIENT HEALTH QUESTIONNAIRE - PHQ9
7. TROUBLE CONCENTRATING ON THINGS, SUCH AS READING THE NEWSPAPER OR WATCHING TELEVISION: NOT AT ALL
4. FEELING TIRED OR HAVING LITTLE ENERGY: NOT AT ALL
3. TROUBLE FALLING OR STAYING ASLEEP OR SLEEPING TOO MUCH: NOT AT ALL
SUM OF ALL RESPONSES TO PHQ9 QUESTIONS 1 AND 2: 0
9. THOUGHTS THAT YOU WOULD BE BETTER OFF DEAD, OR OF HURTING YOURSELF: NOT AT ALL
8. MOVING OR SPEAKING SO SLOWLY THAT OTHER PEOPLE COULD HAVE NOTICED. OR THE OPPOSITE, BEING SO FIGETY OR RESTLESS THAT YOU HAVE BEEN MOVING AROUND A LOT MORE THAN USUAL: NOT AT ALL
1. LITTLE INTEREST OR PLEASURE IN DOING THINGS: NOT AT ALL
2. FEELING DOWN, DEPRESSED, IRRITABLE, OR HOPELESS: NOT AT ALL
6. FEELING BAD ABOUT YOURSELF - OR THAT YOU ARE A FAILURE OR HAVE LET YOURSELF OR YOUR FAMILY DOWN: NOT AL ALL

## 2023-03-04 ASSESSMENT — ENCOUNTER SYMPTOMS
CARDIOVASCULAR NEGATIVE: 1
CHILLS: 0
MUSCULOSKELETAL NEGATIVE: 1
SHORTNESS OF BREATH: 0
DIZZINESS: 0
DIARRHEA: 0
RESPIRATORY NEGATIVE: 1
NAUSEA: 0
COUGH: 0
ABDOMINAL PAIN: 0
WEAKNESS: 1
PSYCHIATRIC NEGATIVE: 1
SORE THROAT: 0
HEADACHES: 0
FEVER: 0
VOMITING: 0
WEIGHT LOSS: 1
GASTROINTESTINAL NEGATIVE: 1
EYES NEGATIVE: 1

## 2023-03-04 ASSESSMENT — LIFESTYLE VARIABLES
ON A TYPICAL DAY WHEN YOU DRINK ALCOHOL HOW MANY DRINKS DO YOU HAVE: 0
AVERAGE NUMBER OF DAYS PER WEEK YOU HAVE A DRINK CONTAINING ALCOHOL: 0
HAVE YOU EVER FELT YOU SHOULD CUT DOWN ON YOUR DRINKING: NO
HOW MANY TIMES IN THE PAST YEAR HAVE YOU HAD 5 OR MORE DRINKS IN A DAY: 0
EVER HAD A DRINK FIRST THING IN THE MORNING TO STEADY YOUR NERVES TO GET RID OF A HANGOVER: NO
ALCOHOL_USE: NO
CONSUMPTION TOTAL: NEGATIVE
TOTAL SCORE: 0
HAVE PEOPLE ANNOYED YOU BY CRITICIZING YOUR DRINKING: NO
EVER FELT BAD OR GUILTY ABOUT YOUR DRINKING: NO
TOTAL SCORE: 0
TOTAL SCORE: 0

## 2023-03-04 ASSESSMENT — PAIN DESCRIPTION - PAIN TYPE
TYPE: ACUTE PAIN

## 2023-03-04 NOTE — H&P
Hospital Medicine History & Physical Note    Date of Service  3/4/2023    Primary Care Physician  Brendan Hargrove M.D.    Code Status  DNAR/DNI    Chief Complaint  Clogged biliary drain    History of Presenting Illness  Carl Finney is a 72 y.o. male who was transferred from outside facility 3/4/2023 with clogged biliary drain.  PMH of pancreatic cancer, HTN, dyslipidemia, prostate cancer s/p prostatectomy, gout, s/p cholecystostomy tube initially placed on 2/23 at this facility that was exchanged to a larger drain on 2/28.  Admitted at [facility] from 2/21 - 3/1.  Completed hemorrhagic/septic shock with hemoglobin 3.4 and evidence of cholecystitis and blood cultures growing Enterobacter cloacea.  On last admission Case was discussed with GI who felt patient was very high risk for intervention in the medical management was the best approach.  He was discharged with Augmentin and Bactrim for suppression as long as the drain is in place.    Last night patient woke up and found his cholecystostomy draining bloody fluid which began to clot.  Eventually stopped draining and he went to the ED, was flushed and started draining bilious/bloody fluid again.  EDP discussed with general surgery at their facility who recommended transfer for further evaluation.  Hemoglobin at outside facility was 9.    At this time hemodynamically stable with adequate draining from his cholecystostomy tube.    I discussed the plan of care with patient and bedside RN.    Review of Systems  Review of Systems   Constitutional:  Negative for chills and fever.   HENT:  Negative for sore throat.    Respiratory:  Negative for cough and shortness of breath.    Cardiovascular:  Negative for chest pain.   Gastrointestinal:  Negative for abdominal pain, diarrhea, nausea and vomiting.   Genitourinary:  Negative for dysuria and urgency.   Neurological:  Negative for dizziness and headaches.   All other systems reviewed and are negative.    Past  Medical History   has a past medical history of Arthritis, Bowel habit changes, Cancer (HCC) (01/01/2016), Fall, History of sepsis (07/01/2017), Jaundice, Renal disorder, and Urinary incontinence.    Surgical History   has a past surgical history that includes other abdominal surgery (3/9/2016); other orthopedic surgery (1993); inguinal hernia repair (Right, 9/30/2016); cystoscopy stent placement (Left, 7/7/2017); retrogrades (Right, 7/7/2017); and percutaneous nephrostolithotomy (Left, 8/28/2017).     Family History  family history reviewed, noncontributory.  Family history reviewed with patient. There is no family history that is pertinent to the chief complaint.     Social History   reports that he has never smoked. He has never used smokeless tobacco. He reports that he does not drink alcohol and does not use drugs.    Allergies  Allergies   Allergen Reactions    Ceftriaxone Unspecified     Unable to speak and high fever  RXN=7/6/17       Medications  Prior to Admission Medications   Prescriptions Last Dose Informant Patient Reported? Taking?   amoxicillin-clavulanate (AUGMENTIN) 875-125 MG Tab   No No   Sig: Take 1 Tablet by mouth 2 times a day for 42 days.   calcium carbonate (TUMS) 500 MG Chew Tab  Significant Other Yes No   Sig: Chew 500 mg as needed.   omeprazole (PRILOSEC) 20 MG delayed-release capsule   No No   Sig: Take 1 Capsule by mouth 2 times a day.   ondansetron (ZOFRAN) 8 MG Tab  Significant Other Yes No   Sig: Take 8 mg by mouth every 8 hours as needed.   oxyCODONE immediate release (ROXICODONE) 10 MG immediate release tablet  Significant Other Yes No   Sig: Take 10 mg by mouth every four hours as needed.   sulfamethoxazole-trimethoprim (BACTRIM DS) 800-160 MG tablet   No No   Sig: Take 1 Tablet by mouth 2 times a day for 42 days.      Facility-Administered Medications Last Administration Doses Remaining   normal saline (PF) 10 mL None recorded 42   normal saline (PF) 3 mL None recorded 1           Physical Exam  Temp:  [36.8 °C (98.3 °F)] 36.8 °C (98.3 °F)  Pulse:  [60] 60  Resp:  [16] 16  BP: (91)/(48) 91/48  SpO2:  [91 %] 91 %  Blood Pressure : 91/48   Temperature: 36.8 °C (98.3 °F)   Pulse: 60   Respiration: 16   Pulse Oximetry: 91 %       Physical Exam  Constitutional:       Appearance: Normal appearance.   HENT:      Head: Normocephalic and atraumatic.      Mouth/Throat:      Mouth: Mucous membranes are moist.      Pharynx: Oropharynx is clear. No oropharyngeal exudate or posterior oropharyngeal erythema.   Eyes:      General: No scleral icterus.  Cardiovascular:      Rate and Rhythm: Normal rate and regular rhythm.      Pulses: Normal pulses.      Heart sounds: Normal heart sounds. No murmur heard.  Pulmonary:      Effort: Pulmonary effort is normal. No respiratory distress.      Breath sounds: Normal breath sounds. No wheezing.   Abdominal:      Palpations: Abdomen is soft.      Tenderness: There is no abdominal tenderness.      Comments: Biliary drain in place with bilious/somewhat bloody fluid   Musculoskeletal:         General: No swelling or tenderness. Normal range of motion.      Cervical back: Normal range of motion.   Skin:     General: Skin is warm and dry.   Neurological:      General: No focal deficit present.      Mental Status: He is alert and oriented to person, place, and time. Mental status is at baseline.   Psychiatric:         Mood and Affect: Mood normal.       Laboratory:              No results for input(s): ALTSGPT, ASTSGOT, ALKPHOSPHAT, TBILIRUBIN, DBILIRUBIN, GAMMAGT, AMYLASE, LIPASE, ALB, PREALBUMIN, GLUCOSE in the last 72 hours.        No results for input(s): NTPROBNP in the last 72 hours.      No results for input(s): TROPONINT in the last 72 hours.    Imaging:  No orders to display       Assessment/Plan:  Justification for Admission Status  I anticipate this patient is appropriate for observation status at this time because presented to outside facility with clogged  drain that is now no longer clogged.  There was concern for anemia as he required 5 units of blood on last admission monitor hemoglobin for need of transfusion    * Cholecystostomy tube dysfunction- (present on admission)  Assessment & Plan  Cholecystostomy tube clogged with blood at outside facility drained with return of flow  Transferred for further evaluation    Anemia in neoplastic disease- (present on admission)  Assessment & Plan  Hemoglobin at outside facility 9.1, repeat labs ordered  Significant anemia on admission last month with hemoglobin of 3.4.  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  Drain cultures from tube growing the same  He was discharged on Augmentin and Bactrim for suppression so long as the tube was in place, continue antibiotics    Pancreatic cancer metastasized to liver (HCC)- (present on admission)  Assessment & Plan  Getting treatment at [facility] with Dr. Villegas where he has been on chemotherapy    Gastroesophageal reflux disease without esophagitis- (present on admission)  Assessment & Plan  Continue omeprazole    History of prostatectomy- (present on admission)  Assessment & Plan  History of prostate cancer s/p prostatectomy        VTE prophylaxis: SCDs/TEDs

## 2023-03-04 NOTE — ASSESSMENT & PLAN NOTE
Hemoglobin at outside facility 9.1, repeat labs ordered  Significant anemia on admission last month with hemoglobin of 3.4.  Presented with chronic cholecystostomy tube filled with blood  Monitor hemoglobin levels and transfuse as needed

## 2023-03-04 NOTE — ASSESSMENT & PLAN NOTE
Cholecystostomy tube clogged with blood at outside facility drained with return of flow  Transferred for further evaluation

## 2023-03-04 NOTE — PROGRESS NOTES
Hospital Medicine Daily Progress Note    Date of Service  3/4/2023    Chief Complaint  Carl Finney is a 72 y.o. male admitted 3/4/2023 with clogged biliary drain    Hospital Course  72 y.o. male who was transferred from outside facility 3/4/2023 with clogged biliary drain.  PMH of pancreatic cancer, HTN, dyslipidemia, prostate cancer s/p prostatectomy, gout, s/p cholecystostomy tube initially placed on 2/23 at this facility that was exchanged to a larger drain on 2/28.  Admitted at [facility] from 2/21 - 3/1.  Completed hemorrhagic/septic shock with hemoglobin 3.4 and evidence of cholecystitis and blood cultures growing Enterobacter cloacea.  On last admission Case was discussed with GI who felt patient was very high risk for intervention in the medical management was the best approach.  He was discharged with Augmentin and Bactrim for suppression as long as the drain is in place.     Last night patient woke up and found his cholecystostomy draining bloody fluid which began to clot.  Eventually stopped draining and he went to the ED, was flushed and started draining bilious/bloody fluid again.  EDP discussed with general surgery at their facility who recommended transfer for further evaluation.  Hemoglobin at outside facility was 9.    Interval Problem Update  Patient was evaluated bedside  Patient reporting bloody output from drain  Hemoglobin at 7.7 and trending down  Abdominal/pelvic CT showing right pigtail catheter with distal loop in the gallbladder and decompressed, high density debris seen in the dilated CBD, measuring 1.5 cm and Ill-defined pancreatic head mass.   Clear liquid diet for now  Continue holding all anticoagulation  Pending iron studies  Frequent H&H, transfuse if less than 7  Labs in a.m.    I have discussed this patient's plan of care and discharge plan at IDT rounds today with Case Management, Nursing, Nursing leadership, and other members of the IDT  team.    Consultants/Specialty  None    Code Status  DNAR/DNI    Disposition  Patient is not medically cleared for discharge.   Anticipate discharge to to home with close outpatient follow-up.  I have placed the appropriate orders for post-discharge needs.    Review of Systems  Review of Systems   Constitutional:  Positive for malaise/fatigue and weight loss.   HENT: Negative.     Eyes: Negative.    Respiratory: Negative.     Cardiovascular: Negative.    Gastrointestinal: Negative.    Genitourinary: Negative.    Musculoskeletal: Negative.    Skin: Negative.    Neurological:  Positive for weakness.   Endo/Heme/Allergies: Negative.    Psychiatric/Behavioral: Negative.        Physical Exam  Temp:  [36.6 °C (97.8 °F)-36.8 °C (98.3 °F)] 36.6 °C (97.8 °F)  Pulse:  [60-99] 99  Resp:  [16-17] 17  BP: ()/(48-55) 100/55  SpO2:  [91 %-98 %] 98 %    Physical Exam  Constitutional:       General: He is not in acute distress.     Appearance: Normal appearance.   HENT:      Head: Normocephalic and atraumatic.      Nose: Nose normal. No congestion.      Mouth/Throat:      Mouth: Mucous membranes are moist.   Eyes:      Extraocular Movements: Extraocular movements intact.      Pupils: Pupils are equal, round, and reactive to light.   Cardiovascular:      Rate and Rhythm: Normal rate and regular rhythm.      Pulses: Normal pulses.      Heart sounds: Normal heart sounds.   Pulmonary:      Effort: Pulmonary effort is normal.      Breath sounds: Normal breath sounds.   Abdominal:      General: Bowel sounds are normal.      Palpations: Abdomen is soft.      Tenderness: There is no abdominal tenderness.      Comments: Biliary drain in place with bilious/somewhat bloody fluid    Musculoskeletal:         General: No swelling. Normal range of motion.      Cervical back: Normal range of motion and neck supple.   Skin:     General: Skin is warm.      Coloration: Skin is not jaundiced.   Neurological:      General: No focal deficit  present.      Mental Status: He is alert and oriented to person, place, and time. Mental status is at baseline.      Cranial Nerves: No cranial nerve deficit.   Psychiatric:         Mood and Affect: Mood normal.         Behavior: Behavior normal.         Thought Content: Thought content normal.         Judgment: Judgment normal.       Fluids    Intake/Output Summary (Last 24 hours) at 3/4/2023 1623  Last data filed at 3/4/2023 1500  Gross per 24 hour   Intake 420 ml   Output 1175 ml   Net -755 ml       Laboratory  Recent Labs     03/04/23  0520   WBC 6.7   RBC 2.61*   HEMOGLOBIN 7.7*   HEMATOCRIT 23.7*   MCV 90.8   MCH 29.5   MCHC 32.5*   RDW 55.5*   PLATELETCT 406   MPV 9.0     Recent Labs     03/04/23  0520   SODIUM 136   POTASSIUM 4.1   CHLORIDE 107   CO2 19*   GLUCOSE 101*   BUN 11   CREATININE 0.89   CALCIUM 8.3*                   Imaging  CT-ABDOMEN-PELVIS W/O   Final Result         Limited exam due to lack of IV contrast.      1. Right pigtail catheter with distal loop in the gallbladder. The gallbladder appears decompressed.   2. High density debris seen in the dilated CBD, measuring 1.5 cm.   3. Ill-defined pancreatic head mass is redemonstrated, poorly visualized..   4. Multiple small hypoattenuating lesions in the liver are redemonstrated, poorly seen on noncontrast CT           Assessment/Plan  * Normocytic anemia- (present on admission)  Assessment & Plan  Likely secondary to biliary bloody drain  Abdominal/pelvic CT with biliary drain in place and no reports of hematoma or bleed  Possibly secondary from malignancy  Anemia of neoplasm contributing  Hold anticoagulation for now  Pending iron studies  Trend H&H, transfuse if less than 7  Labs on a.m.    Anemia in neoplastic disease- (present on admission)  Assessment & Plan  Hemoglobin at outside facility 9.1, repeat labs ordered  Significant anemia on admission last month with hemoglobin of 3.4.  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  Drain cultures from tube growing the same  He was discharged on Augmentin and Bactrim for suppression so long as the tube was in place, continue antibiotics    Pancreatic cancer metastasized to liver (HCC)- (present on admission)  Assessment & Plan  Getting treatment at [facility] with Dr. Villegas where he has been on chemotherapy    History of prostatectomy- (present on admission)  Assessment & Plan  History of prostate cancer s/p prostatectomy         VTE prophylaxis: SCDs/TEDs    I have performed a physical exam and reviewed and updated ROS and Plan today (3/4/2023). In review of yesterday's note (3/3/2023), there are no changes except as documented above.

## 2023-03-04 NOTE — CARE PLAN
The patient is Stable - Low risk of patient condition declining or worsening    Shift Goals  Clinical Goals: drain management  Patient Goals: drain management    Progress made toward(s) clinical / shift goals:  pt. Biliary drainage color improving, pt. States color is not as bloody as it was when he first got to the hospital.       Problem: Knowledge Deficit - Standard  Goal: Patient and family/care givers will demonstrate understanding of plan of care, disease process/condition, diagnostic tests and medications  Outcome: Progressing     Problem: Pain - Standard  Goal: Alleviation of pain or a reduction in pain to the patient’s comfort goal  Outcome: Progressing     Problem: Skin Integrity  Goal: Skin integrity is maintained or improved  Outcome: Progressing       Patient is not progressing towards the following goals:

## 2023-03-04 NOTE — ASSESSMENT & PLAN NOTE
Per ID note last admission emphysematous cholecystitis/cholangitis  Blood cultures 2/21 are growing Enterobacter cloacae complex.  Drain cultures from tube growing the same  He was discharged on Augmentin and Bactrim for suppression so long as the tube was in place, continue antibiotics

## 2023-03-04 NOTE — DIETARY
"Nutrition services: Day 0 of admit.  Carl Finney is a 72 y.o. male with admitting DX of cystostomy malfunction.     Consult received for BMI <19 and unintentional weight loss of 2-13 lbs in 3 months (MST 2).       Assessment:  Height: 188 cm (6' 2.02\")  Weight: 61.8 kg (136 lb 3.9 oz) via standing scale   Body mass index is 17.49 kg/m²., BMI classification: underweight   Diet/Intake: NPO    Evaluation:   Pt with reported weight loss. Per chart review pt is down 5 lbs from last admit (3.5% in 10 days) and 22 lbs (13.9%) in ~3 months. This is severe weight loss.   Current clinical picture and MD progress notes reviewed. Pt with recent dx of pancreatic cancer in November 2022 with metastasis on chemotherapy. S/p cholecystostomy tube 2/23. Admitted today 3/4 for clogged biliary drain.   Labs and Meds reviewed   Skin: no noted staged wounds or pressure injuries   +BM pta    Malnutrition Risk: Severe malnutrition met 2/23: \"Severe malnutrition in the context of chronic illness r/t pancreatic cancer as evidenced by severe muscle and severe fat wasting, and 10.8% severe weight loss in 3 months.\"    Recommendations/Plan:  Diet advancements as medically feasible per MD   Addition of Boost VHC with meals if within diet parameters when diet advanced to help bolster nutrition while in acute care.   Encourage intake of all meals and supplements when diet advanced >50%  Document intake of all meals and supplements as % taken in ADL's to provide interdisciplinary communication across all shifts.   Monitor weight.    RD following         "

## 2023-03-04 NOTE — PROGRESS NOTES
Received report from [facility]. Patient ambulated from [facility] to hospital bed. Oriented to floor and use of call light. Safety maintained. Bed in low position, wheels locked, side rails up x2 and call light within reach.

## 2023-03-04 NOTE — PROGRESS NOTES
4 Eyes Skin Assessment Completed by ERIC Larry and ERIC Rees.    Head WDL  Ears : redness, blanching  Nose WDL  Mouth WDL  Neck WDL  Breast/Chest : rt flank biliary drain  Shoulder Blades WDL  Spine WDL  (R) Arm/Elbow/Hand WDL  (L) Arm/Elbow/Hand WDL  Abdomen WDL  Groin WDL  Scrotum/Coccyx/Buttocks WDL  (R) Leg WDL  (L) Leg WDL  (R) Heel/Foot/Toe WDL  (L) Heel/Foot/Toe WDL          Devices In Places : none      Interventions In Place N/A    Possible Skin Injury No    Pictures Uploaded Into Epic N/A  Wound Consult Placed N/A  RN Wound Prevention Protocol Ordered No

## 2023-03-04 NOTE — PROGRESS NOTES
Hospitalist acceptance note:    Patient is a 72-year-old male with a history of pancreatic cancer with metastasis on chemotherapy who was recently discharged from [facility] for acute cholecystitis.  IR placed cholecystostomy tube on February 23 and eventually had a larger drain placed on every 28.  IR recommended that the drain remain in place for 6 weeks and to be flushed with sterile saline daily.  He was discharged with Augmentin and Bactrim.    Last night, patient noted to have been draining blood-tinged fluids which began to clot in the last 24 hours.  Eventually, it stopped draining prompting patient to come into the ED for evaluation.  Cholecystostomy tube was flushed at ER and is now draining with dark-colored reddish fluids.  Hemoglobin 8.4, total bilirubin 2.1.  Other than that CBC and CHEM panel unremarkable.  General surgery contacted at outside facility, recommended to be transferred to [facility] for evaluation of cholecystostomy tube.

## 2023-03-04 NOTE — PROGRESS NOTES
Pt. Alert and oriented x4. Call light and personal items in reach during rounds. Pt. Resting at this time. Report received at bedside by night shift RN. No needs voiced at this time.

## 2023-03-04 NOTE — ASSESSMENT & PLAN NOTE
Getting treatment at [facility] with Dr. Villegas where he has been on chemotherapy

## 2023-03-05 LAB
ANION GAP SERPL CALC-SCNC: 11 MMOL/L (ref 7–16)
BASOPHILS # BLD AUTO: 0.8 % (ref 0–1.8)
BASOPHILS # BLD: 0.05 K/UL (ref 0–0.12)
BUN SERPL-MCNC: 9 MG/DL (ref 8–22)
CALCIUM SERPL-MCNC: 8.5 MG/DL (ref 8.5–10.5)
CHLORIDE SERPL-SCNC: 107 MMOL/L (ref 96–112)
CO2 SERPL-SCNC: 19 MMOL/L (ref 20–33)
CREAT SERPL-MCNC: 0.99 MG/DL (ref 0.5–1.4)
EOSINOPHIL # BLD AUTO: 0.06 K/UL (ref 0–0.51)
EOSINOPHIL NFR BLD: 0.9 % (ref 0–6.9)
ERYTHROCYTE [DISTWIDTH] IN BLOOD BY AUTOMATED COUNT: 55.3 FL (ref 35.9–50)
GFR SERPLBLD CREATININE-BSD FMLA CKD-EPI: 81 ML/MIN/1.73 M 2
GLUCOSE SERPL-MCNC: 95 MG/DL (ref 65–99)
HCT VFR BLD AUTO: 24.1 % (ref 42–52)
HGB BLD-MCNC: 7.7 G/DL (ref 14–18)
HGB BLD-MCNC: 8.2 G/DL (ref 14–18)
IMM GRANULOCYTES # BLD AUTO: 0.05 K/UL (ref 0–0.11)
IMM GRANULOCYTES NFR BLD AUTO: 0.8 % (ref 0–0.9)
LYMPHOCYTES # BLD AUTO: 0.92 K/UL (ref 1–4.8)
LYMPHOCYTES NFR BLD: 14.5 % (ref 22–41)
MCH RBC QN AUTO: 29.3 PG (ref 27–33)
MCHC RBC AUTO-ENTMCNC: 32 G/DL (ref 33.7–35.3)
MCV RBC AUTO: 91.6 FL (ref 81.4–97.8)
MONOCYTES # BLD AUTO: 0.58 K/UL (ref 0–0.85)
MONOCYTES NFR BLD AUTO: 9.1 % (ref 0–13.4)
NEUTROPHILS # BLD AUTO: 4.69 K/UL (ref 1.82–7.42)
NEUTROPHILS NFR BLD: 73.9 % (ref 44–72)
NRBC # BLD AUTO: 0 K/UL
NRBC BLD-RTO: 0 /100 WBC
PLATELET # BLD AUTO: 401 K/UL (ref 164–446)
PMV BLD AUTO: 9 FL (ref 9–12.9)
POTASSIUM SERPL-SCNC: 3.8 MMOL/L (ref 3.6–5.5)
RBC # BLD AUTO: 2.63 M/UL (ref 4.7–6.1)
SODIUM SERPL-SCNC: 137 MMOL/L (ref 135–145)
WBC # BLD AUTO: 6.4 K/UL (ref 4.8–10.8)

## 2023-03-05 PROCEDURE — 80048 BASIC METABOLIC PNL TOTAL CA: CPT

## 2023-03-05 PROCEDURE — 700102 HCHG RX REV CODE 250 W/ 637 OVERRIDE(OP): Performed by: STUDENT IN AN ORGANIZED HEALTH CARE EDUCATION/TRAINING PROGRAM

## 2023-03-05 PROCEDURE — 85018 HEMOGLOBIN: CPT

## 2023-03-05 PROCEDURE — 99232 SBSQ HOSP IP/OBS MODERATE 35: CPT | Performed by: STUDENT IN AN ORGANIZED HEALTH CARE EDUCATION/TRAINING PROGRAM

## 2023-03-05 PROCEDURE — A9270 NON-COVERED ITEM OR SERVICE: HCPCS | Performed by: STUDENT IN AN ORGANIZED HEALTH CARE EDUCATION/TRAINING PROGRAM

## 2023-03-05 PROCEDURE — 36415 COLL VENOUS BLD VENIPUNCTURE: CPT

## 2023-03-05 PROCEDURE — 770006 HCHG ROOM/CARE - MED/SURG/GYN SEMI*

## 2023-03-05 PROCEDURE — 85025 COMPLETE CBC W/AUTO DIFF WBC: CPT

## 2023-03-05 RX ADMIN — SULFAMETHOXAZOLE AND TRIMETHOPRIM 1 TABLET: 800; 160 TABLET ORAL at 05:24

## 2023-03-05 RX ADMIN — OXYCODONE 5 MG: 5 TABLET ORAL at 18:00

## 2023-03-05 RX ADMIN — ACETAMINOPHEN 650 MG: 500 TABLET, FILM COATED ORAL at 15:49

## 2023-03-05 RX ADMIN — SULFAMETHOXAZOLE AND TRIMETHOPRIM 1 TABLET: 800; 160 TABLET ORAL at 17:24

## 2023-03-05 RX ADMIN — OXYCODONE 5 MG: 5 TABLET ORAL at 08:43

## 2023-03-05 RX ADMIN — AMOXICILLIN AND CLAVULANATE POTASSIUM 1 TABLET: 875; 125 TABLET, FILM COATED ORAL at 17:25

## 2023-03-05 RX ADMIN — OMEPRAZOLE 20 MG: 20 CAPSULE, DELAYED RELEASE ORAL at 08:42

## 2023-03-05 RX ADMIN — OXYCODONE 5 MG: 5 TABLET ORAL at 14:13

## 2023-03-05 RX ADMIN — OMEPRAZOLE 20 MG: 20 CAPSULE, DELAYED RELEASE ORAL at 17:25

## 2023-03-05 RX ADMIN — AMOXICILLIN AND CLAVULANATE POTASSIUM 1 TABLET: 875; 125 TABLET, FILM COATED ORAL at 08:43

## 2023-03-05 ASSESSMENT — ENCOUNTER SYMPTOMS
RESPIRATORY NEGATIVE: 1
GASTROINTESTINAL NEGATIVE: 1
WEAKNESS: 1
WEIGHT LOSS: 1
MUSCULOSKELETAL NEGATIVE: 1
EYES NEGATIVE: 1
CARDIOVASCULAR NEGATIVE: 1
PSYCHIATRIC NEGATIVE: 1

## 2023-03-05 ASSESSMENT — PATIENT HEALTH QUESTIONNAIRE - PHQ9
4. FEELING TIRED OR HAVING LITTLE ENERGY: NOT AT ALL
5. POOR APPETITE OR OVEREATING: NOT AT ALL
SUM OF ALL RESPONSES TO PHQ QUESTIONS 1-9: 0
8. MOVING OR SPEAKING SO SLOWLY THAT OTHER PEOPLE COULD HAVE NOTICED. OR THE OPPOSITE, BEING SO FIGETY OR RESTLESS THAT YOU HAVE BEEN MOVING AROUND A LOT MORE THAN USUAL: NOT AT ALL
3. TROUBLE FALLING OR STAYING ASLEEP OR SLEEPING TOO MUCH: NOT AT ALL
SUM OF ALL RESPONSES TO PHQ9 QUESTIONS 1 AND 2: 0
2. FEELING DOWN, DEPRESSED, IRRITABLE, OR HOPELESS: NOT AT ALL
6. FEELING BAD ABOUT YOURSELF - OR THAT YOU ARE A FAILURE OR HAVE LET YOURSELF OR YOUR FAMILY DOWN: NOT AL ALL
7. TROUBLE CONCENTRATING ON THINGS, SUCH AS READING THE NEWSPAPER OR WATCHING TELEVISION: NOT AT ALL
9. THOUGHTS THAT YOU WOULD BE BETTER OFF DEAD, OR OF HURTING YOURSELF: NOT AT ALL
1. LITTLE INTEREST OR PLEASURE IN DOING THINGS: NOT AT ALL

## 2023-03-05 ASSESSMENT — PAIN DESCRIPTION - PAIN TYPE
TYPE: ACUTE PAIN

## 2023-03-05 NOTE — ASSESSMENT & PLAN NOTE
Likely secondary to biliary bloody drain  Abdominal/pelvic CT with biliary drain in place and no reports of hematoma or bleed  Possibly secondary from malignancy  Anemia of neoplasm contributing  Hold anticoagulation for now  Pending iron studies  Trend H&H, transfuse if less than 7  Labs on a.m.

## 2023-03-05 NOTE — PROGRESS NOTES
Care Transition Team Final Discharge Disposition    Actual Discharge Information  Discharge Disposition: Discharged to home/self care (01)    CM notified of patient discharge today. Upon review of patient chart patient did not meet INPT criteria. CM reached out to LISA for Code 44 review.      Patient rolled back to observation/outpatient status per UR committee MD secondary review. Condition code 44 completed. CM met with Carl at bedside to explain Code 44 Observation status and provided Information letter. Carl did not have any questions.    CM confirmed transportation home today. Per conversation with Carl they are having Blizzard conditions in [facility] and ride not able to get to [place] to transport home today. CM updated provider.     Avoidable day charted.    No other CM needs at this time.

## 2023-03-05 NOTE — CARE PLAN
The patient is Stable - Low risk of patient condition declining or worsening    Shift Goals  Clinical Goals: monitor drain output  Patient Goals: Rest and walk in the unit    Progress made toward(s) clinical / shift goals:  Will continue to monitor drainage output and will encourage to call for assistance when needed.  Problem: Knowledge Deficit - Standard  Goal: Patient and family/care givers will demonstrate understanding of plan of care, disease process/condition, diagnostic tests and medications  Description: Target End Date:  1-3 days or as soon as patient condition allows    Document in Patient Education    1.  Patient and family/caregiver oriented to unit, equipment, visitation policy and means for communicating concern  2.  Complete/review Learning Assessment  3.  Assess knowledge level of disease process/condition, treatment plan, diagnostic tests and medications  4.  Explain disease process/condition, treatment plan, diagnostic tests and medications  Outcome: Progressing     Problem: Pain - Standard  Goal: Alleviation of pain or a reduction in pain to the patient’s comfort goal  Description: Target End Date:  Prior to discharge or change in level of care    Document on Vitals flowsheet    1.  Document pain using the appropriate pain scale per order or unit policy  2.  Educate and implement non-pharmacologic comfort measures (i.e. relaxation, distraction, massage, cold/heat therapy, etc.)  3.  Pain management medications as ordered  4.  Reassess pain after pain med administration per policy  5.  If opiods administered assess patient's response to pain medication is appropriate per POSS sedation scale  6.  Follow pain management plan developed in collaboration with patient and interdisciplinary team (including palliative care or pain specialists if applicable)  Outcome: Progressing     Problem: Skin Integrity  Goal: Skin integrity is maintained or improved  Description: Target End Date:  Prior to discharge  or change in level of care    Document interventions on Skin Risk/Milind flowsheet groups and corresponding LDA    1.  Assess and monitor skin integrity, appearance and/or temperature  2.  Assess risk factors for impaired skin integrity and/or pressures ulcers  3.  Implement precautions to protect skin integrity in collaboration with interdisciplinary team  4.  Implement pressure ulcer prevention protocol if at risk for skin breakdown  5.  Confirm wound care consult if at risk for skin breakdown  6.  Ensure patient use of pressure relieving devices  (Low air loss bed, waffle overlay, heel protectors, ROHO cushion, etc)  Outcome: Progressing       Patient is not progressing towards the following goals:

## 2023-03-05 NOTE — PROGRESS NOTES
"Received alert and oriented x 4. Check vitals sign and recorded accordingly and due med given per MAR. Monitor sign and symptoms of respiratory distress and treatment given accordingly per MAR.Medicated per MAR and reassessed every 2 hours per protocol. Call light within reach. Needs attended. Will continue to monitor./59   Pulse 71   Temp 36.4 °C (97.5 °F) (Temporal)   Resp 18   Ht 1.88 m (6' 2.02\")   Wt 61.8 kg (136 lb 3.9 oz)   SpO2 96%   BMI 17.49 kg/m² . Right biliary drain dry and intact dressing with dark brown with blood output.  "

## 2023-03-05 NOTE — PROGRESS NOTES
[facility] Medicine Daily Progress Note    Date of Service  3/5/2023    Chief Complaint  Carl Finney is a 72 y.o. male admitted 3/4/2023 with clogged biliary drain    Hospital Course  72 y.o. male who was transferred from outside facility 3/4/2023 with clogged biliary drain.  PMH of pancreatic cancer, HTN, dyslipidemia, prostate cancer s/p prostatectomy, gout, s/p cholecystostomy tube initially placed on 2/23 at this facility that was exchanged to a larger drain on 2/28.  Admitted at [facility] from 2/21 - 3/1.  Completed hemorrhagic/septic shock with hemoglobin 3.4 and evidence of cholecystitis and blood cultures growing Enterobacter cloacea.  On last admission Case was discussed with GI who felt patient was very high risk for intervention in the medical management was the best approach.  He was discharged with Augmentin and Bactrim for suppression as long as the drain is in place.     Last night patient woke up and found his cholecystostomy draining bloody fluid which began to clot.  Eventually stopped draining and he went to the ED, was flushed and started draining bilious/bloody fluid again.  EDP discussed with general surgery at their facility who recommended transfer for further evaluation.  Hemoglobin at outside facility was 9.    Interval Problem Update  Patient was evaluated bedside  Patient reporting bloody output from drain  Patient tolerated liquid diet, will increase and observe  Hemoglobin stable today  Abdominal/pelvic CT showing right pigtail catheter with distal loop in the gallbladder and decompressed, high density debris seen in the dilated CBD, measuring 1.5 cm and Ill-defined pancreatic head mass.   Continue holding all anticoagulation  Frequent H&H, transfuse if less than 7  Labs in a.m.    I have discussed this patient's plan of care and discharge plan at IDT rounds today with Case Management, Nursing, Nursing leadership, and other members of the IDT  team.    Consultants/Specialty  None    Code Status  DNAR/DNI    Disposition  Patient is not medically cleared for discharge.   Anticipate discharge to to home with close outpatient follow-up.  I have placed the appropriate orders for post-discharge needs.    Review of Systems  Review of Systems   Constitutional:  Positive for malaise/fatigue and weight loss.   HENT: Negative.     Eyes: Negative.    Respiratory: Negative.     Cardiovascular: Negative.    Gastrointestinal: Negative.    Genitourinary: Negative.    Musculoskeletal: Negative.    Skin: Negative.    Neurological:  Positive for weakness.   Endo/Heme/Allergies: Negative.    Psychiatric/Behavioral: Negative.        Physical Exam  Temp:  [36.3 °C (97.4 °F)-36.6 °C (97.9 °F)] 36.3 °C (97.4 °F)  Pulse:  [60-71] 65  Resp:  [17-18] 17  BP: (102-108)/(53-59) 104/55  SpO2:  [90 %-98 %] 90 %    Physical Exam  Constitutional:       General: He is not in acute distress.     Appearance: Normal appearance.   HENT:      Head: Normocephalic and atraumatic.      Nose: Nose normal. No congestion.      Mouth/Throat:      Mouth: Mucous membranes are moist.   Eyes:      Extraocular Movements: Extraocular movements intact.      Pupils: Pupils are equal, round, and reactive to light.   Cardiovascular:      Rate and Rhythm: Normal rate and regular rhythm.      Pulses: Normal pulses.      Heart sounds: Normal heart sounds.   Pulmonary:      Effort: Pulmonary effort is normal.      Breath sounds: Normal breath sounds.   Abdominal:      General: Bowel sounds are normal.      Palpations: Abdomen is soft.      Tenderness: There is no abdominal tenderness.      Comments: Biliary drain in place with bilious/somewhat bloody fluid    Musculoskeletal:         General: No swelling. Normal range of motion.      Cervical back: Normal range of motion and neck supple.   Skin:     General: Skin is warm.      Coloration: Skin is not jaundiced.   Neurological:      General: No focal deficit  present.      Mental Status: He is alert and oriented to person, place, and time. Mental status is at baseline.      Cranial Nerves: No cranial nerve deficit.   Psychiatric:         Mood and Affect: Mood normal.         Behavior: Behavior normal.         Thought Content: Thought content normal.         Judgment: Judgment normal.       Fluids    Intake/Output Summary (Last 24 hours) at 3/5/2023 1530  Last data filed at 3/5/2023 0315  Gross per 24 hour   Intake 360 ml   Output 1285 ml   Net -925 ml       Laboratory  Recent Labs     03/04/23  0520 03/04/23  1822 03/05/23  0025 03/05/23  1050   WBC 6.7  --  6.4  --    RBC 2.61*  --  2.63*  --    HEMOGLOBIN 7.7* 7.8* 7.7* 8.2*   HEMATOCRIT 23.7*  --  24.1*  --    MCV 90.8  --  91.6  --    MCH 29.5  --  29.3  --    MCHC 32.5*  --  32.0*  --    RDW 55.5*  --  55.3*  --    PLATELETCT 406  --  401  --    MPV 9.0  --  9.0  --      Recent Labs     03/04/23  0520 03/05/23  0025   SODIUM 136 137   POTASSIUM 4.1 3.8   CHLORIDE 107 107   CO2 19* 19*   GLUCOSE 101* 95   BUN 11 9   CREATININE 0.89 0.99   CALCIUM 8.3* 8.5                   Imaging  CT-ABDOMEN-PELVIS W/O   Final Result         Limited exam due to lack of IV contrast.      1. Right pigtail catheter with distal loop in the gallbladder. The gallbladder appears decompressed.   2. High density debris seen in the dilated CBD, measuring 1.5 cm.   3. Ill-defined pancreatic head mass is redemonstrated, poorly visualized..   4. Multiple small hypoattenuating lesions in the liver are redemonstrated, poorly seen on noncontrast CT           Assessment/Plan  * Normocytic anemia- (present on admission)  Assessment & Plan  Likely secondary to biliary bloody drain  Abdominal/pelvic CT with biliary drain in place and no reports of hematoma or bleed  Possibly secondary from malignancy  Anemia of neoplasm contributing  Hold anticoagulation for now  Pending iron studies  Trend H&H, transfuse if less than 7  Labs on a.m.    Anemia in  neoplastic disease- (present on admission)  Assessment & Plan  Hemoglobin at outside facility 9.1, repeat labs ordered  Significant anemia on admission last month with hemoglobin of 3.4.  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  Drain cultures from tube growing the same  He was discharged on Augmentin and Bactrim for suppression so long as the tube was in place, continue antibiotics    Pancreatic cancer metastasized to liver (HCC)- (present on admission)  Assessment & Plan  Getting treatment at [facility] with Dr. Villegas where he has been on chemotherapy    History of prostatectomy- (present on admission)  Assessment & Plan  History of prostate cancer s/p prostatectomy         VTE prophylaxis: SCDs/TEDs    I have performed a physical exam and reviewed and updated ROS and Plan today (3/5/2023). In review of yesterday's note (3/4/2023), there are no changes except as documented above.

## 2023-03-06 VITALS
OXYGEN SATURATION: 98 % | WEIGHT: 136.24 LBS | RESPIRATION RATE: 17 BRPM | HEIGHT: 74 IN | BODY MASS INDEX: 17.49 KG/M2 | SYSTOLIC BLOOD PRESSURE: 102 MMHG | DIASTOLIC BLOOD PRESSURE: 48 MMHG | TEMPERATURE: 97.4 F | HEART RATE: 78 BPM

## 2023-03-06 LAB
BASOPHILS # BLD AUTO: 1.3 % (ref 0–1.8)
BASOPHILS # BLD: 0.07 K/UL (ref 0–0.12)
EOSINOPHIL # BLD AUTO: 0.02 K/UL (ref 0–0.51)
EOSINOPHIL NFR BLD: 0.4 % (ref 0–6.9)
ERYTHROCYTE [DISTWIDTH] IN BLOOD BY AUTOMATED COUNT: 54.4 FL (ref 35.9–50)
HCT VFR BLD AUTO: 22.7 % (ref 42–52)
HGB BLD-MCNC: 7.3 G/DL (ref 14–18)
IMM GRANULOCYTES # BLD AUTO: 0.03 K/UL (ref 0–0.11)
IMM GRANULOCYTES NFR BLD AUTO: 0.5 % (ref 0–0.9)
LYMPHOCYTES # BLD AUTO: 0.81 K/UL (ref 1–4.8)
LYMPHOCYTES NFR BLD: 14.8 % (ref 22–41)
MCH RBC QN AUTO: 29.1 PG (ref 27–33)
MCHC RBC AUTO-ENTMCNC: 32.2 G/DL (ref 33.7–35.3)
MCV RBC AUTO: 90.4 FL (ref 81.4–97.8)
MONOCYTES # BLD AUTO: 0.54 K/UL (ref 0–0.85)
MONOCYTES NFR BLD AUTO: 9.8 % (ref 0–13.4)
NEUTROPHILS # BLD AUTO: 4.02 K/UL (ref 1.82–7.42)
NEUTROPHILS NFR BLD: 73.2 % (ref 44–72)
NRBC # BLD AUTO: 0 K/UL
NRBC BLD-RTO: 0 /100 WBC
PLATELET # BLD AUTO: 385 K/UL (ref 164–446)
PMV BLD AUTO: 9.1 FL (ref 9–12.9)
RBC # BLD AUTO: 2.51 M/UL (ref 4.7–6.1)
WBC # BLD AUTO: 5.5 K/UL (ref 4.8–10.8)

## 2023-03-06 PROCEDURE — 700102 HCHG RX REV CODE 250 W/ 637 OVERRIDE(OP): Performed by: STUDENT IN AN ORGANIZED HEALTH CARE EDUCATION/TRAINING PROGRAM

## 2023-03-06 PROCEDURE — 85025 COMPLETE CBC W/AUTO DIFF WBC: CPT

## 2023-03-06 PROCEDURE — 36415 COLL VENOUS BLD VENIPUNCTURE: CPT

## 2023-03-06 PROCEDURE — A9270 NON-COVERED ITEM OR SERVICE: HCPCS | Performed by: STUDENT IN AN ORGANIZED HEALTH CARE EDUCATION/TRAINING PROGRAM

## 2023-03-06 PROCEDURE — 99239 HOSP IP/OBS DSCHRG MGMT >30: CPT | Performed by: STUDENT IN AN ORGANIZED HEALTH CARE EDUCATION/TRAINING PROGRAM

## 2023-03-06 RX ADMIN — AMOXICILLIN AND CLAVULANATE POTASSIUM 1 TABLET: 875; 125 TABLET, FILM COATED ORAL at 07:44

## 2023-03-06 RX ADMIN — OXYCODONE 5 MG: 5 TABLET ORAL at 07:40

## 2023-03-06 RX ADMIN — OMEPRAZOLE 20 MG: 20 CAPSULE, DELAYED RELEASE ORAL at 05:54

## 2023-03-06 RX ADMIN — SULFAMETHOXAZOLE AND TRIMETHOPRIM 1 TABLET: 800; 160 TABLET ORAL at 07:44

## 2023-03-06 ASSESSMENT — PAIN DESCRIPTION - PAIN TYPE
TYPE: ACUTE PAIN

## 2023-03-06 NOTE — PROGRESS NOTES
"Received alert and oriented x 4. Check vitals sign and recorded accordingly and due med given per MAR. Monitor sign and symptoms of respiratory distress and treatment given accordingly per MAR.Medicated per MAR and reassessed every 2 hours per protocol. Call light within reach. Frequently walking in the hallway. Needs attended. Will continue to monitor.BP 92/48   Pulse 81   Temp 36.2 °C (97.2 °F) (Temporal)   Resp 18   Ht 1.88 m (6' 2.02\")   Wt 61.8 kg (136 lb 3.9 oz)   SpO2 98%   BMI 17.49 kg/m² .   "

## 2023-03-06 NOTE — DOCUMENTATION QUERY
"                                                                         [facility]                                                                       Query Response Note      PATIENT:               ANITA HARRISON  ACCT #:                  [id number]  MRN:                     [id number]  :                      1950  ADMIT DATE:       3/4/2023 4:37 AM  DISCH DATE:        3/6/2023 10:53 AM  RESPONDING  PROVIDER #:        [id number]           QUERY TEXT:    Based upon your judgment and the above clinical indicators, please select the most appropriate diagnosis for the findings.    Note: If you agree with a diagnosis listed above, please remember to include it in your concurrent daily documentation and onto the Discharge Summary.      The patient's clinical indicators include:  72 year old male admitted for a cholecystostomy tube dysfunction.    3/4 RD Consult Isabel Armenta  \" Malnutrition Risk: Severe malnutrition met : \"Severe malnutrition in the context of chronic illness r/t pancreatic cancer as evidenced by severe muscle and severe fat wasting, and 10.8% severe weight loss in 3 months.\"  \" Per chart review pt is down 5 lbs from last admit (3.5% in 10 days) and 22 lbs (13.9%) in ~3 months. This is severe weight loss.\"    BMI 17.49 underweight    Risk factors: pancreatic cancer with mets to liver on chemo,  NPO  Treatment: labs, RD consult, Boost, document meals,       If you have any questions please contact:  Colleen Zimmer RN CDI [facility]  [email or web address]  Colleen Zimmer [address]  Options provided:   -- Severe protein-calorie malnutrition is a valid diagnosis treated this admission with Boost   -- Severe malnutrition, ruled out   -- Other explanation, please specify   -- Unable to determine      Query created by: Colleen Zimmer on 3/6/2023 10:06 AM    RESPONSE TEXT:    Severe protein-calorie malnutrition is a valid diagnosis treated this admission with Boost          Electronically " signed by:  LILLIANA ABDULLAHI MD 3/6/2023 12:36 PM

## 2023-03-06 NOTE — DISCHARGE PLANNING
Met with pt about discharge today. He signed IMM. Pt observed to be independent with ADLs. Wife can assist him at home. Pt said that he has a terminal disease and he plans to go to his PCP in [place] and discuss his options re: death.   Moral support offerred to pt. He was very kind and calm.      Care Transition Team Assessment    Information Source  Orientation Level: Oriented X4  Information Given By: Patient  Who is responsible for making decisions for patient? : Patient    Readmission Evaluation  Is this a readmission?: No    Elopement Risk  Legal Hold: No  Ambulatory or Self Mobile in Wheelchair: Yes  Disoriented: No  Psychiatric Symptoms: None  History of Wandering: No  Elopement this Admit: No  Vocalizing Wanting to Leave: No  Displays Behaviors, Body Language Wanting to Leave: No-Not at Risk for Elopement    Interdisciplinary Discharge Planning  Does Admitting Nurse Feel This Could be a Complex Discharge?: No  Primary Care Physician: PCP in [place]  Lives with - Patient's Self Care Capacity: Spouse  Patient or legal guardian wants to designate a caregiver: Yes  Caregiver name: Lori Finney  Caregiver contact info: [phone]  [place] [place]ization for Release of Health Information has been completed: Yes  Support Systems: Friends / Neighbors, Spouse / Significant Other  Housing / Facility: [address]  Do You Take your Prescribed Medications Regularly: Yes  Able to Return to Previous ADL's: No  Mobility Issues: No  Prior Services: Home-Independent  Patient Prefers to be Discharged to:: Home  Assistance Needed: No  Durable Medical Equipment: Not Applicable    Discharge Preparedness  What is your plan after discharge?: Home with help  What are your discharge supports?: Spouse  Prior Functional Level: Ambulatory, Drives Self, Independent with Activities of Daily Living, Independent with Medication Management  Difficulity with ADLs: None  Difficulity with IADLs: None    Functional Assesment  Prior  Functional Level: Ambulatory, Drives Self, Independent with Activities of Daily Living, Independent with Medication Management    Finances  Financial Barriers to Discharge: No  Prescription Coverage: Yes    Vision / Hearing Impairment  Vision Impairment : Yes  Right Eye Vision: Impaired, Wears Glasses  Left Eye Vision: Impaired, Wears Glasses  Hearing Impairment : Yes (slight [place], no hearing aide)  Hearing Impairment: Both Ears, Hearing Device Not Available  Does Pt Need Special Equipment for the Hearing Impaired?: Yes-But Does not Need for Facility to Arrange Equipment    Values / Beliefs / Concerns  Values / Beliefs Concerns : No    Advance Directive  Advance Directive?: None    Domestic Abuse  Have you ever been the victim of abuse or violence?: No  Physical Abuse or Sexual Abuse: No  Verbal Abuse or Emotional Abuse: No  Possible Abuse/Neglect Reported to:: Not Applicable         Discharge Risks or Barriers  Discharge risks or barriers?: Post-acute placement / services    Anticipated Discharge Information  Discharge Disposition: Discharged to home/self care (01)

## 2023-03-06 NOTE — DISCHARGE SUMMARY
Discharge Summary    CHIEF COMPLAINT ON ADMISSION  No chief complaint on file.      Reason for Admission  cystostomy malfunction     Admission Date  3/4/2023    CODE STATUS  DNAR/DNI    HPI & HOSPITAL COURSE  72 y.o. male who was transferred from outside facility 3/4/2023 with clogged biliary drain.  PMH of pancreatic cancer, HTN, dyslipidemia, prostate cancer s/p prostatectomy, gout, s/p cholecystostomy tube initially placed on 2/23 at this facility that was exchanged to a larger drain on 2/28 secondary to mass ductal obstruction  Augmentin and Bactrim for drain suppression was admitted on 3/4/2023 for symptomatic anemia secondary to drain bleed.  Patient started on IV hydration and hemoglobin was monitored. Follow-up CT imaging showing right pigtail catheter in gallbladder and decompressed gallbladder as well as high density debris and dilated CBD. Iron studies not suggestive of iron deficiency anemia and bleed resolved on its own.  Vitals as well as labs remained stable.  Stable patient with in chronic condition was discharged home and instructed to follow-up with his primary care provider and oncologist in the outpatient setting.  All results and plan of action was cussed with the patient for she voiced understanding and agreement with the primary care team.  Patient was instructed to return to emergency department if symptoms were to worsen.    Therefore, he is discharged in good and stable condition to home with close outpatient follow-up.    The patient met 2-midnight criteria for an inpatient stay at the time of discharge.    Discharge Date  3/6/2023    FOLLOW UP ITEMS POST DISCHARGE  Primary care provider follow posthospital discharge care  Outpatient oncology to follow pancreatic cancer care    DISCHARGE DIAGNOSES  Principal Problem:    Normocytic anemia POA: Yes  Active Problems:    History of prostatectomy POA: Yes    Pancreatic cancer metastasized to liver (HCC) POA: Yes    Emphysematous cholecystitis  POA: Yes    Gastroesophageal reflux disease without esophagitis POA: Yes    Cholecystostomy tube dysfunction POA: Yes    Anemia in neoplastic disease POA: Yes    Symptomatic anemia POA: Yes  Resolved Problems:    * No resolved hospital problems. *      FOLLOW UP  No future appointments.  No follow-up provider specified.    MEDICATIONS ON DISCHARGE     Medication List        CONTINUE taking these medications        Instructions   amoxicillin-clavulanate 875-125 MG Tabs  Commonly known as: AUGMENTIN   Doctor's comments: Continue until drain removed as outpatient per GI/surgery  Take 1 Tablet by mouth 2 times a day for 42 days.  Dose: 1 Tablet     calcium carbonate 500 MG Chew  Commonly known as: Tums   Chew 500 mg as needed.  Dose: 500 mg     omeprazole 20 MG delayed-release capsule  Commonly known as: PRILOSEC   Take 1 Capsule by mouth 2 times a day.  Dose: 20 mg     ondansetron 8 MG Tabs  Commonly known as: ZOFRAN   Take 8 mg by mouth every 8 hours as needed.  Dose: 8 mg     oxyCODONE immediate release 10 MG immediate release tablet  Commonly known as: ROXICODONE   Take 10 mg by mouth every four hours as needed.  Dose: 10 mg     sulfamethoxazole-trimethoprim 800-160 MG tablet  Commonly known as: BACTRIM DS   Doctor's comments: Continue until drain removed as outpatient per GI/ surgery  Take 1 Tablet by mouth 2 times a day for 42 days.  Dose: 1 Tablet              Allergies  Allergies   Allergen Reactions    Ceftriaxone Unspecified     Unable to speak and high fever  RXN=7/6/17       DIET  Orders Placed This Encounter   Procedures    Diet Order Diet: Regular     Standing Status:   Standing     Number of Occurrences:   1     Order Specific Question:   Diet:     Answer:   Regular [1]       ACTIVITY  As tolerated.  Weight bearing as tolerated    CONSULTATIONS  None    PROCEDURES  None    LABORATORY  Lab Results   Component Value Date    SODIUM 137 03/05/2023    POTASSIUM 3.8 03/05/2023    CHLORIDE 107 03/05/2023    CO2  19 (L) 03/05/2023    GLUCOSE 95 03/05/2023    BUN 9 03/05/2023    CREATININE 0.99 03/05/2023        Lab Results   Component Value Date    WBC 5.5 03/06/2023    HEMOGLOBIN 7.3 (L) 03/06/2023    HEMATOCRIT 22.7 (L) 03/06/2023    PLATELETCT 385 03/06/2023        Total time of the discharge process exceeds 35 minutes.

## 2023-03-06 NOTE — CARE PLAN
The patient is Stable - Low risk of patient condition declining or worsening    Shift Goals  Clinical Goals: monitor drain output  Patient Goals: Rest and ambulation in the hallway    Progress made toward(s) clinical / shift goals:  Will continue to monitor drainage output. Possible discharge tomorrow 3/6/23  Problem: Knowledge Deficit - Standard  Goal: Patient and family/care givers will demonstrate understanding of plan of care, disease process/condition, diagnostic tests and medications  Description: Target End Date:  1-3 days or as soon as patient condition allows    Document in Patient Education    1.  Patient and family/caregiver oriented to unit, equipment, visitation policy and means for communicating concern  2.  Complete/review Learning Assessment  3.  Assess knowledge level of disease process/condition, treatment plan, diagnostic tests and medications  4.  Explain disease process/condition, treatment plan, diagnostic tests and medications  Outcome: Progressing     Problem: Pain - Standard  Goal: Alleviation of pain or a reduction in pain to the patient’s comfort goal  Description: Target End Date:  Prior to discharge or change in level of care    Document on Vitals flowsheet    1.  Document pain using the appropriate pain scale per order or unit policy  2.  Educate and implement non-pharmacologic comfort measures (i.e. relaxation, distraction, massage, cold/heat therapy, etc.)  3.  Pain management medications as ordered  4.  Reassess pain after pain med administration per policy  5.  If opiods administered assess patient's response to pain medication is appropriate per POSS sedation scale  6.  Follow pain management plan developed in collaboration with patient and interdisciplinary team (including palliative care or pain specialists if applicable)  Outcome: Progressing     Problem: Skin Integrity  Goal: Skin integrity is maintained or improved  Description: Target End Date:  Prior to discharge or change  in level of care    Document interventions on Skin Risk/Milind flowsheet groups and corresponding LDA    1.  Assess and monitor skin integrity, appearance and/or temperature  2.  Assess risk factors for impaired skin integrity and/or pressures ulcers  3.  Implement precautions to protect skin integrity in collaboration with interdisciplinary team  4.  Implement pressure ulcer prevention protocol if at risk for skin breakdown  5.  Confirm wound care consult if at risk for skin breakdown  6.  Ensure patient use of pressure relieving devices  (Low air loss bed, waffle overlay, heel protectors, ROHO cushion, etc)  Outcome: Progressing       Patient is not progressing towards the following goals:

## 2023-03-08 NOTE — PROGRESS NOTES
Assumed care of patient 0700. Received Report from [facility] nurse. Patient A&O 4, on RA, Reporting a pain level of 3. Call light within reach, belongings within reach, Fall precautions in place, and bed alarm is on and bed in lowest position. Patient does not have any other needs at this time.     POC was discussed with patient. All questions were answered. Patient verbalized understanding.

## 2023-03-08 NOTE — PROGRESS NOTES
Discharge instructions completed and patient verbalizes understanding. All questions answered and addressed.

## 2023-03-08 NOTE — PROGRESS NOTES
Discharge Instructions    Discharged to home by car with relative. Discharged via walking, hospital escort: Yes.  Special equipment needed: Not Applicable    Be sure to schedule a follow-up appointment with your primary care doctor or any specialists as instructed.     Discharge Plan:   Diet Plan: Discussed  Activity Level: Discussed  Confirmed Follow up Appointment: Appointment Scheduled  Confirmed Symptoms Management: Discussed  Influenza Vaccine Indication: Patient Refuses    I understand that a diet low in cholesterol, fat, and sodium is recommended for good health. Unless I have been given specific instructions below for another diet, I accept this instruction as my diet prescription.   Other diet: Regular    Special Instructions: None    -Is this patient being discharged with medication to prevent blood clots?  No    Is patient discharged on Warfarin / Coumadin?   No     Patient discharged with all personal belongings.

## (undated) DEVICE — GOWN SURGICAL X-LARGE ULTRA - FILM-REINFORCED (20/CA)

## (undated) DEVICE — SODIUM CHL IRRIGATION 0.9% 1000ML (12EA/CA)

## (undated) DEVICE — LEAD SET 6 DISP. EKG NIHON KOHDEN (100EA/CA) [9859].

## (undated) DEVICE — GOWN WARMING STANDARD FLEX - (30/CA)

## (undated) DEVICE — MASK ANESTHESIA ADULT  - (100/CA)

## (undated) DEVICE — PLUG CATHETER DRAIN TUBE PROTECTOR CAP

## (undated) DEVICE — PACK CYSTOSCOPY III - (8/CA)

## (undated) DEVICE — PROBE ULTRASOUND DISP 3.8X330

## (undated) DEVICE — SLEEVE, VASO, THIGH, MED

## (undated) DEVICE — SET IRRIGATION CYSTOSCOPY Y-TYPE L81 IN (20EA/CA)

## (undated) DEVICE — DRAPE T CRANIOTOMY W/POUCH - (9/CA)

## (undated) DEVICE — CATHETER URETHRAL OPEN END AXXCESS (10EA/BX)

## (undated) DEVICE — ARMREST CRADLE FOAM - (2PR/PK 12PR/CA)

## (undated) DEVICE — LACTATED RINGERS INJ 1000 ML - (14EA/CA 60CA/PF)

## (undated) DEVICE — HEAD HOLDER JUNIOR/ADULT

## (undated) DEVICE — ELECTRODE 850 FOAM ADHESIVE - HYDROGEL RADIOTRNSPRNT (50/PK)

## (undated) DEVICE — TUBE E-T HI-LO CUFF 8.0MM (10EA/PK)

## (undated) DEVICE — GLOVE BIOGEL SZ 8 SURGICAL PF LTX - (50PR/BX 4BX/CA)

## (undated) DEVICE — SPECIMEN PLASTIC CONVERTOR - (10/CA)

## (undated) DEVICE — GLOVE BIOGEL SZ 7.5 SURGICAL PF LTX - (50PR/BX 4BX/CA)

## (undated) DEVICE — CATHETER URET DUAL LUMEN

## (undated) DEVICE — JELLY, KY 2 0Z STERILE

## (undated) DEVICE — SENSOR SPO2 NEO LNCS ADHESIVE (20/BX) SEE USER NOTES

## (undated) DEVICE — SET LEADWIRE 5 LEAD BEDSIDE DISPOSABLE ECG (1SET OF 5/EA)

## (undated) DEVICE — WIRE GUIDE SENSOR DUAL FLEX - 5/BX

## (undated) DEVICE — CATH, COUNCIL TIP 20 FR

## (undated) DEVICE — PACK MINOR BASIN - (2EA/CA)

## (undated) DEVICE — CONTAINER SPECIMEN BAG OR - STERILE 4 OZ W/LID (100EA/CA)

## (undated) DEVICE — CHLORAPREP 26 ML APPLICATOR - ORANGE TINT(25/CA)

## (undated) DEVICE — MEDICINE CUP STERILE 2 OZ - (100/CA)

## (undated) DEVICE — SYRINGE DISP. 12 CC LL - (100/BX)

## (undated) DEVICE — NEPTUNE 4 PORT MANIFOLD - (20/PK)

## (undated) DEVICE — SYRINGE 10 ML CONTROL LL (25EA/BX 4BX/CA)

## (undated) DEVICE — WATER IRRIG. STER. 1500 ML - (9/CA)

## (undated) DEVICE — SET EXTENSION WITH 2 PORTS (48EA/CA) ***PART #2C8610 IS A SUBSTITUTE*****

## (undated) DEVICE — GOWN SURGEONS X-LARGE - DISP. (30/CA)

## (undated) DEVICE — KIT ANESTHESIA W/CIRCUIT & 3/LT BAG W/FILTER (20EA/CA)

## (undated) DEVICE — SODIUM CHL. IRRIGATION 0.9% 3000ML (4EA/CA 65CA/PF)

## (undated) DEVICE — TUBE CONNECT SUCTION CLEAR 120 X 1/4" (50EA/CA)"

## (undated) DEVICE — SUCTION INSTRUMENT YANKAUER BULBOUS TIP W/O VENT (50EA/CA)

## (undated) DEVICE — CATHETER URETHRAL OPEN END AXXCESS (10EA/BX) [78257].

## (undated) DEVICE — SYRINGE 50ML CATHETER TIP (40EA/BX 4BX/CA)

## (undated) DEVICE — SPONGE GAUZESTER 4 X 4 4PLY - (128PK/CA)

## (undated) DEVICE — ELECTRODE DUAL RETURN W/ CORD - (50/PK)

## (undated) DEVICE — COVER FOOT UNIVERSAL DISP. - (25EA/CA)

## (undated) DEVICE — PROTECTOR ULNA NERVE - (36PR/CA)

## (undated) DEVICE — DRESSING TRANSPARENT FILM TEGADERM 4 X 4.75" (50EA/BX)"

## (undated) DEVICE — TUBING CLEARLINK DUO-VENT - C-FLO (48EA/CA)

## (undated) DEVICE — KIT ROOM DECONTAMINATION

## (undated) DEVICE — BAG DRAINAGE URINARY CLOSED 2000ML (20EA/CA)

## (undated) DEVICE — CONNECTOR HOSE NEPTUNE FOR CYSTO ROOM

## (undated) DEVICE — NEEDLE NON SAFETY HYPO 22 GA X 1 1/2 IN (100/BX)

## (undated) DEVICE — CATHETER URETHRAL FOLEY SILICONE OD16 FR 10 ML (10EA/CA)

## (undated) DEVICE — CANISTER SUCTION 3000ML MECHANICAL FILTER AUTO SHUTOFF MEDI-VAC NONSTERILE LF DISP  (40EA/CA)

## (undated) DEVICE — BAG URODRAIN WITH TUBING - (20/CA)

## (undated) DEVICE — DRAPE C-ARM LARGE 41IN X 74 IN - (10/BX 2BX/CA)

## (undated) DEVICE — SYRINGE 30 ML LL (56/BX)

## (undated) DEVICE — SUTURE 2-0 SILK FS (12EA/BX)

## (undated) DEVICE — SUTURE GENERAL

## (undated) DEVICE — DRAPE LARGE 3 QUARTER - (20/CA)

## (undated) DEVICE — CATHETER BALLOON NEPHROMAX

## (undated) DEVICE — PROBE PNEUMATIC DISP 1MMX497MM

## (undated) DEVICE — KIT 2.25IN UROS 2 PC DRN - 57MM 2 1/4 INCH (5/BX)

## (undated) DEVICE — BLADE SURGICAL #11 - (50/BX)

## (undated) DEVICE — SYRINGE TOOMEY (50EA/CA)